# Patient Record
Sex: FEMALE | Race: BLACK OR AFRICAN AMERICAN | Employment: OTHER | URBAN - METROPOLITAN AREA
[De-identification: names, ages, dates, MRNs, and addresses within clinical notes are randomized per-mention and may not be internally consistent; named-entity substitution may affect disease eponyms.]

---

## 2017-02-06 PROBLEM — Z86.79 HISTORY OF CEREBRAL ANEURYSM REPAIR: Status: ACTIVE | Noted: 2017-02-06

## 2017-02-06 PROBLEM — Z98.890 HISTORY OF CEREBRAL ANEURYSM REPAIR: Status: ACTIVE | Noted: 2017-02-06

## 2017-02-06 PROBLEM — F03.A0 MILD DEMENTIA (HCC): Status: ACTIVE | Noted: 2017-02-06

## 2018-04-12 DIAGNOSIS — I25.10 CORONARY ARTERY DISEASE INVOLVING NATIVE HEART WITHOUT ANGINA PECTORIS, UNSPECIFIED VESSEL OR LESION TYPE: ICD-10-CM

## 2018-04-12 RX ORDER — CLOPIDOGREL BISULFATE 75 MG/1
75 TABLET ORAL DAILY
Qty: 90 TABLET | Refills: 3 | Status: SHIPPED | OUTPATIENT
Start: 2018-04-12 | End: 2018-07-02 | Stop reason: SDUPTHER

## 2018-07-02 DIAGNOSIS — E87.6 HYPOKALEMIA: ICD-10-CM

## 2018-07-02 DIAGNOSIS — I25.10 CORONARY ARTERY DISEASE INVOLVING NATIVE HEART WITHOUT ANGINA PECTORIS, UNSPECIFIED VESSEL OR LESION TYPE: ICD-10-CM

## 2018-07-02 DIAGNOSIS — G89.4 CHRONIC PAIN SYNDROME: ICD-10-CM

## 2018-07-02 RX ORDER — CLOPIDOGREL BISULFATE 75 MG/1
75 TABLET ORAL DAILY
Qty: 90 TABLET | Refills: 3 | Status: SHIPPED | OUTPATIENT
Start: 2018-07-02 | End: 2019-06-18 | Stop reason: SDUPTHER

## 2018-07-02 RX ORDER — POTASSIUM CHLORIDE 750 MG/1
10 TABLET, EXTENDED RELEASE ORAL DAILY
Qty: 90 TABLET | Refills: 1 | Status: SHIPPED | OUTPATIENT
Start: 2018-07-02 | End: 2019-06-16 | Stop reason: ALTCHOICE

## 2018-07-02 RX ORDER — METOPROLOL SUCCINATE 25 MG/1
25 TABLET, EXTENDED RELEASE ORAL DAILY
Qty: 90 TABLET | Refills: 1 | Status: SHIPPED | OUTPATIENT
Start: 2018-07-02 | End: 2019-06-17 | Stop reason: SDUPTHER

## 2018-07-09 ENCOUNTER — OFFICE VISIT (OUTPATIENT)
Dept: FAMILY MEDICINE CLINIC | Age: 73
End: 2018-07-09

## 2018-07-09 VITALS
BODY MASS INDEX: 24.37 KG/M2 | RESPIRATION RATE: 18 BRPM | HEART RATE: 61 BPM | TEMPERATURE: 97.5 F | SYSTOLIC BLOOD PRESSURE: 118 MMHG | DIASTOLIC BLOOD PRESSURE: 88 MMHG | WEIGHT: 151 LBS | OXYGEN SATURATION: 97 %

## 2018-07-09 DIAGNOSIS — I10 ESSENTIAL HYPERTENSION: Primary | ICD-10-CM

## 2018-07-09 DIAGNOSIS — E87.6 HYPOKALEMIA: ICD-10-CM

## 2018-07-09 DIAGNOSIS — F03.A0 MILD DEMENTIA: ICD-10-CM

## 2018-07-09 PROCEDURE — 99213 OFFICE O/P EST LOW 20 MIN: CPT | Performed by: PHYSICIAN ASSISTANT

## 2018-07-09 ASSESSMENT — PATIENT HEALTH QUESTIONNAIRE - PHQ9
2. FEELING DOWN, DEPRESSED OR HOPELESS: 0
SUM OF ALL RESPONSES TO PHQ QUESTIONS 1-9: 0
SUM OF ALL RESPONSES TO PHQ9 QUESTIONS 1 & 2: 0
1. LITTLE INTEREST OR PLEASURE IN DOING THINGS: 0

## 2018-07-09 NOTE — PROGRESS NOTES
[employer]  [address] [facility], [address] [place] [address] Drive  2/[birth year]18 7/9/18      HPI:  Patient presents for general check up, htn and hypokalemia. She is accompanied by her son, Lloyd Gowers. She continues to have no insurance and has outstanding medical bills that Lloyd Gowers keeps referring to. He has been down to [facility] office and is trying to get her some help. She has been getting her medications with assistance from the pharmacy. She takes them everyday with no s/e. He is concerned about her memory, although he believes she has not decompensated since her last visit. She has mild memory loss. He states that she has had a ct scan already to evaluate. Her most recent labs were 12/17. We reviewed. She has no acute complaints. Past Medical History:   Diagnosis Date    Arthritis     Brain aneurysm     Hypertension     Lupus         Past Surgical History:   Procedure Laterality Date    APPENDECTOMY      BRAIN ANEURYSM SURGERY      6     CHOLECYSTECTOMY  4/8/2016    cholelithias    INCONTINENCE SURGERY         Current Outpatient Prescriptions   Medication Sig Dispense Refill    clopidogrel (PLAVIX) 75 MG tablet Take 1 tablet by mouth daily 90 tablet 3    potassium chloride (KLOR-CON M10) 10 MEQ extended release tablet Take 1 tablet by mouth daily 90 tablet 1    metoprolol succinate (TOPROL XL) 25 MG extended release tablet Take 1 tablet by mouth daily 90 tablet 1    acetaminophen (APAP EXTRA STRENGTH) 500 MG tablet Take 2 tablets by mouth every 6 hours as needed for Pain 120 tablet 3     No current facility-administered medications for this visit. Allergies   Allergen Reactions    Dye [Iodides]      Ct scan dye    Penicillins Hives       Family History   Problem Relation Age of Onset    Family history unknown: Yes       Social History     Social History    Marital status:       Spouse name: N/A    Number of children: N/A    Years of

## 2019-03-13 ENCOUNTER — TELEPHONE (OUTPATIENT)
Dept: FAMILY MEDICINE CLINIC | Age: 74
End: 2019-03-13

## 2019-03-14 RX ORDER — GUAIFENESIN 400 MG/1
400 TABLET ORAL 4 TIMES DAILY PRN
Qty: 15 TABLET | Refills: 0 | Status: SHIPPED | OUTPATIENT
Start: 2019-03-14 | End: 2019-06-16 | Stop reason: ALTCHOICE

## 2019-06-16 ENCOUNTER — HOSPITAL ENCOUNTER (EMERGENCY)
Age: 74
Discharge: HOME | End: 2019-06-16
Attending: EMERGENCY MEDICINE

## 2019-06-16 ENCOUNTER — APPOINTMENT (OUTPATIENT)
Dept: CT IMAGING | Age: 74
End: 2019-06-16

## 2019-06-16 VITALS
HEIGHT: 66 IN | BODY MASS INDEX: 24.91 KG/M2 | OXYGEN SATURATION: 93 % | DIASTOLIC BLOOD PRESSURE: 85 MMHG | RESPIRATION RATE: 16 BRPM | WEIGHT: 155 LBS | SYSTOLIC BLOOD PRESSURE: 158 MMHG | HEART RATE: 61 BPM | TEMPERATURE: 97.9 F

## 2019-06-16 DIAGNOSIS — N20.0 NEPHROLITHIASIS: ICD-10-CM

## 2019-06-16 DIAGNOSIS — M54.31 SCIATICA OF RIGHT SIDE: ICD-10-CM

## 2019-06-16 DIAGNOSIS — M51.26 LUMBAR DISC HERNIATION: Primary | ICD-10-CM

## 2019-06-16 LAB
ALBUMIN SERPL-MCNC: 3.5 G/DL (ref 3.5–5.2)
ALP BLD-CCNC: 117 U/L (ref 35–104)
ALT SERPL-CCNC: 14 U/L (ref 0–32)
ANION GAP SERPL CALCULATED.3IONS-SCNC: 9 MMOL/L (ref 7–16)
AST SERPL-CCNC: 19 U/L (ref 0–31)
BACTERIA: NORMAL /HPF
BASOPHILS ABSOLUTE: 0.03 E9/L (ref 0–0.2)
BASOPHILS RELATIVE PERCENT: 0.4 % (ref 0–2)
BILIRUB SERPL-MCNC: 0.5 MG/DL (ref 0–1.2)
BILIRUBIN URINE: NEGATIVE
BLOOD, URINE: NEGATIVE
BUN BLDV-MCNC: 15 MG/DL (ref 8–23)
CALCIUM SERPL-MCNC: 9 MG/DL (ref 8.6–10.2)
CHLORIDE BLD-SCNC: 105 MMOL/L (ref 98–107)
CLARITY: CLEAR
CO2: 26 MMOL/L (ref 22–29)
COLOR: YELLOW
CREAT SERPL-MCNC: 1 MG/DL (ref 0.5–1)
EOSINOPHILS ABSOLUTE: 0.13 E9/L (ref 0.05–0.5)
EOSINOPHILS RELATIVE PERCENT: 1.7 % (ref 0–6)
GFR AFRICAN AMERICAN: >60
GFR NON-AFRICAN AMERICAN: >60 ML/MIN/1.73
GLUCOSE BLD-MCNC: 99 MG/DL (ref 74–99)
GLUCOSE URINE: NEGATIVE MG/DL
HCT VFR BLD CALC: 43.7 % (ref 34–48)
HEMOGLOBIN: 14.1 G/DL (ref 11.5–15.5)
IMMATURE GRANULOCYTES #: 0.03 E9/L
IMMATURE GRANULOCYTES %: 0.4 % (ref 0–5)
KETONES, URINE: NEGATIVE MG/DL
LACTIC ACID: 0.8 MMOL/L (ref 0.5–2.2)
LEUKOCYTE ESTERASE, URINE: NEGATIVE
LIPASE: 22 U/L (ref 13–60)
LYMPHOCYTES ABSOLUTE: 1.78 E9/L (ref 1.5–4)
LYMPHOCYTES RELATIVE PERCENT: 23.6 % (ref 20–42)
MCH RBC QN AUTO: 31.2 PG (ref 26–35)
MCHC RBC AUTO-ENTMCNC: 32.3 % (ref 32–34.5)
MCV RBC AUTO: 96.7 FL (ref 80–99.9)
MONOCYTES ABSOLUTE: 1.05 E9/L (ref 0.1–0.95)
MONOCYTES RELATIVE PERCENT: 13.9 % (ref 2–12)
MUCUS: PRESENT
NEUTROPHILS ABSOLUTE: 4.52 E9/L (ref 1.8–7.3)
NEUTROPHILS RELATIVE PERCENT: 60 % (ref 43–80)
NITRITE, URINE: NEGATIVE
PDW BLD-RTO: 12.6 FL (ref 11.5–15)
PH UA: 6 (ref 5–9)
PLATELET # BLD: 261 E9/L (ref 130–450)
PMV BLD AUTO: 9.6 FL (ref 7–12)
POTASSIUM SERPL-SCNC: 3.6 MMOL/L (ref 3.5–5)
PROTEIN UA: NEGATIVE MG/DL
RBC # BLD: 4.52 E12/L (ref 3.5–5.5)
RBC UA: NORMAL /HPF (ref 0–2)
REASON FOR REJECTION: NORMAL
REJECTED TEST: NORMAL
SODIUM BLD-SCNC: 140 MMOL/L (ref 132–146)
SPECIFIC GRAVITY UA: 1.02 (ref 1–1.03)
TOTAL PROTEIN: 7.6 G/DL (ref 6.4–8.3)
UROBILINOGEN, URINE: 0.2 E.U./DL
WBC # BLD: 7.5 E9/L (ref 4.5–11.5)
WBC UA: NORMAL /HPF (ref 0–5)

## 2019-06-16 PROCEDURE — 6360000002 HC RX W HCPCS: Performed by: EMERGENCY MEDICINE

## 2019-06-16 PROCEDURE — 72131 CT LUMBAR SPINE W/O DYE: CPT

## 2019-06-16 PROCEDURE — 80053 COMPREHEN METABOLIC PANEL: CPT

## 2019-06-16 PROCEDURE — 81001 URINALYSIS AUTO W/SCOPE: CPT

## 2019-06-16 PROCEDURE — 96374 THER/PROPH/DIAG INJ IV PUSH: CPT

## 2019-06-16 PROCEDURE — 83690 ASSAY OF LIPASE: CPT

## 2019-06-16 PROCEDURE — 85025 COMPLETE CBC W/AUTO DIFF WBC: CPT

## 2019-06-16 PROCEDURE — 2580000003 HC RX 258: Performed by: EMERGENCY MEDICINE

## 2019-06-16 PROCEDURE — 99284 EMERGENCY DEPT VISIT MOD MDM: CPT

## 2019-06-16 PROCEDURE — 96375 TX/PRO/DX INJ NEW DRUG ADDON: CPT

## 2019-06-16 PROCEDURE — 87088 URINE BACTERIA CULTURE: CPT

## 2019-06-16 PROCEDURE — 83605 ASSAY OF LACTIC ACID: CPT

## 2019-06-16 PROCEDURE — 74176 CT ABD & PELVIS W/O CONTRAST: CPT

## 2019-06-16 RX ORDER — 0.9 % SODIUM CHLORIDE 0.9 %
1000 INTRAVENOUS SOLUTION INTRAVENOUS ONCE
Status: COMPLETED | OUTPATIENT
Start: 2019-06-16 | End: 2019-06-16

## 2019-06-16 RX ORDER — MORPHINE SULFATE 2 MG/ML
4 INJECTION, SOLUTION INTRAMUSCULAR; INTRAVENOUS ONCE
Status: COMPLETED | OUTPATIENT
Start: 2019-06-16 | End: 2019-06-16

## 2019-06-16 RX ORDER — ONDANSETRON 2 MG/ML
4 INJECTION INTRAMUSCULAR; INTRAVENOUS ONCE
Status: COMPLETED | OUTPATIENT
Start: 2019-06-16 | End: 2019-06-16

## 2019-06-16 RX ORDER — NAPROXEN 500 MG/1
500 TABLET ORAL 2 TIMES DAILY WITH MEALS
Qty: 14 TABLET | Refills: 3 | Status: SHIPPED | OUTPATIENT
Start: 2019-06-16 | End: 2019-06-18

## 2019-06-16 RX ORDER — KETOROLAC TROMETHAMINE 30 MG/ML
15 INJECTION, SOLUTION INTRAMUSCULAR; INTRAVENOUS ONCE
Status: COMPLETED | OUTPATIENT
Start: 2019-06-16 | End: 2019-06-16

## 2019-06-16 RX ORDER — TAMSULOSIN HYDROCHLORIDE 0.4 MG/1
0.4 CAPSULE ORAL DAILY
Qty: 30 CAPSULE | Refills: 3 | Status: SHIPPED | OUTPATIENT
Start: 2019-06-16 | End: 2021-02-15

## 2019-06-16 RX ORDER — HYDROCODONE BITARTRATE AND ACETAMINOPHEN 5; 325 MG/1; MG/1
1 TABLET ORAL EVERY 4 HOURS PRN
Qty: 5 TABLET | Refills: 0 | Status: SHIPPED | OUTPATIENT
Start: 2019-06-16 | End: 2019-06-18

## 2019-06-16 RX ORDER — ONDANSETRON 4 MG/1
4 TABLET, ORALLY DISINTEGRATING ORAL EVERY 8 HOURS PRN
Qty: 10 TABLET | Refills: 0 | Status: SHIPPED | OUTPATIENT
Start: 2019-06-16 | End: 2019-06-18

## 2019-06-16 RX ADMIN — SODIUM CHLORIDE 1000 ML: 9 INJECTION, SOLUTION INTRAVENOUS at 08:24

## 2019-06-16 RX ADMIN — KETOROLAC TROMETHAMINE 15 MG: 30 INJECTION, SOLUTION INTRAMUSCULAR at 12:09

## 2019-06-16 RX ADMIN — ONDANSETRON 4 MG: 2 INJECTION INTRAMUSCULAR; INTRAVENOUS at 08:24

## 2019-06-16 RX ADMIN — MORPHINE SULFATE 4 MG: 2 INJECTION, SOLUTION INTRAMUSCULAR; INTRAVENOUS at 08:23

## 2019-06-16 ASSESSMENT — PAIN SCALES - GENERAL
PAINLEVEL_OUTOF10: 9
PAINLEVEL_OUTOF10: 9
PAINLEVEL_OUTOF10: 7

## 2019-06-16 ASSESSMENT — PAIN DESCRIPTION - DESCRIPTORS: DESCRIPTORS: PATIENT UNABLE TO DESCRIBE

## 2019-06-16 ASSESSMENT — ENCOUNTER SYMPTOMS
BACK PAIN: 1
NAUSEA: 0
COUGH: 0
SHORTNESS OF BREATH: 0
ABDOMINAL PAIN: 0
SORE THROAT: 0
VOMITING: 0
COLOR CHANGE: 0

## 2019-06-16 ASSESSMENT — PAIN DESCRIPTION - ORIENTATION: ORIENTATION: RIGHT;LOWER

## 2019-06-16 ASSESSMENT — PAIN DESCRIPTION - LOCATION: LOCATION: FLANK;BACK

## 2019-06-16 ASSESSMENT — PAIN - FUNCTIONAL ASSESSMENT: PAIN_FUNCTIONAL_ASSESSMENT: PREVENTS OR INTERFERES SOME ACTIVE ACTIVITIES AND ADLS

## 2019-06-16 ASSESSMENT — PAIN DESCRIPTION - PROGRESSION: CLINICAL_PROGRESSION: GRADUALLY WORSENING

## 2019-06-16 ASSESSMENT — PAIN DESCRIPTION - FREQUENCY: FREQUENCY: CONTINUOUS

## 2019-06-16 ASSESSMENT — PAIN DESCRIPTION - PAIN TYPE: TYPE: ACUTE PAIN

## 2019-06-16 NOTE — ED NOTES
ATTENDING PROVIDER ATTESTATION:     Melo Menjivar presented to the emergency department for evaluation of Back Pain (Started about one week ago, denies injury, radiates to flank area right side) and Dysuria (Since yesterday)   and was initially evaluated by the Medical Resident. See Original ED Note for H&P and ED course above. I have reviewed and discussed the case, including pertinent history (medical, surgical, family and social) and exam findings with the Medical Resident assigned to Melo Menjivar. I have personally performed and/or participated in the history, exam, medical decision making, and procedures and agree with all pertinent clinical information and any additional changes or corrections are noted below in my assessment and plan. I have discussed this patient in detail with the resident, and provided the instruction and education,       I have reviewed my findings and recommendations with the assigned Medical Resident, Melo Menjivar and members of family present at the time of disposition. Review of Systems:   Pertinent positives and negatives are stated within HPI, all other systems reviewed and are negative.    --------------------------------------------- PAST HISTORY ---------------------------------------------  Past Medical History:  has a past medical history of Arthritis, Brain aneurysm, Hypertension, and Lupus ([facility] [address] Past Surgical History:  has a past surgical history that includes Brain aneurysm surgery; Appendectomy; Incontinence surgery; and Cholecystectomy (4/8/2016). Social History:  reports that she quit smoking about 33 years ago. She started smoking about 54 years ago. She smoked 1.00 pack per day. She has never used smokeless tobacco. She reports that she does not drink alcohol or use drugs. Family History: Family history is unknown by patient. The patients home medications have been reviewed.     Allergies: Dye [iodides] and Penicillins               Back pain  No red flag sx  ?disc disease  No bowel or bladder incontinence or saddle anesthesia  No motor weakness  No fever  Improving in the ED  Exam is reassuring    1. Lumbar disc herniation    2.  Nephrolithiasis           Abiola Moseley MD  06/16/19 [id number]

## 2019-06-16 NOTE — ED PROVIDER NOTES
Delvin Sprague is a 72 y.o. female with PMH significant for HTN and Lupus presenting to the emergency department for Flank Pain. Patient reports she has had intermittent right flank pain over the past week. Patient denies any injury. Patient complains associated burning with urination that started yesterday. Patient denies any saddle paresthesia, urinary retention or fecal incontinence. Patient lives at home with her son. Per chart review, patient has history of lumbar pain and kidney stones. The history is provided by the patient. Abdominal Pain   Pain location:  R flank  Pain quality: sharp    Pain radiates to:  Suprapubic region  Chronicity:  New  Relieved by:  Position changes  Worsened by:  Palpation and urination  Associated symptoms: dysuria    Associated symptoms: no chest pain, no chills, no cough, no fever, no nausea, no shortness of breath, no sore throat and no vomiting        Review of Systems   Constitutional: Negative for chills and fever. HENT: Negative for congestion and sore throat. Eyes: Negative for visual disturbance. Respiratory: Negative for cough and shortness of breath. Cardiovascular: Negative for chest pain. Gastrointestinal: Negative for abdominal pain, nausea and vomiting. Genitourinary: Positive for dysuria and flank pain. Negative for decreased urine volume. Musculoskeletal: Positive for back pain. Negative for neck pain. Skin: Negative for color change. Neurological: Negative for headaches. Psychiatric/Behavioral: Negative for confusion. Physical Exam   Constitutional: She is oriented to person, place, and time. She appears well-developed and well-nourished. No distress. HENT:   Head: Normocephalic and atraumatic. Nose: Nose normal.   Mouth/Throat: Oropharynx is clear and moist and mucous membranes are normal.   Eyes: Conjunctivae are normal.   Neck: Normal range of motion. Neck supple.    Cardiovascular: Normal rate, regular rhythm, intact distal pulses and normal pulses. Pulses:       Dorsalis pedis pulses are 2+ on the right side, and 2+ on the left side. Pulmonary/Chest: Effort normal and breath sounds normal. She has no wheezes. She has no rhonchi. She has no rales. Abdominal: Soft. Bowel sounds are normal. She exhibits no pulsatile midline mass. There is no tenderness. There is CVA tenderness (right). There is no rigidity, no rebound and no guarding. Musculoskeletal:        Cervical back: She exhibits no tenderness and no bony tenderness. Thoracic back: She exhibits no tenderness and no bony tenderness. Lumbar back: She exhibits tenderness and bony tenderness. She exhibits no swelling, no edema and no deformity. Back:    Tenderness in area of right SI joint. Patient has positive straight leg raise test on the right. Patient has equal  strength. Patient able to perform elbow flexion and extension against resistance. Patient able to perform hip flexion, knee extension, plantar flexion, dorsiflexion and extension for hallicus longus muscle against resistance. Neurological: She is alert and oriented to person, place, and time. No cranial nerve deficit (CN II-XII grossly intact) or sensory deficit. She exhibits normal muscle tone. Sensation intact throughout b/l lower extremities   Skin: Skin is warm and dry. Capillary refill takes less than 2 seconds. Psychiatric: She has a normal mood and affect. Her speech is normal.   Nursing note and vitals reviewed. Procedures    MDM  Number of Diagnoses or Management Options  Lumbar disc herniation:   Nephrolithiasis:   Diagnosis management comments: Patient presents to the ED for back pain/flank pain over 1 week. She also complained of associated dysuria. We initially obtained blood work, imaging and urine sample. Patient was given pain medication and IV fluids for their symptoms with moderate improvement.  Workup in the ED revealed lumbar disc herniation which is consistent with patient's presenation. Left sided kidney stone also noted and discussed with patient. No lower exstremity weakness or sensory deficit or complaints of saddle paraesthesias, urinary retention or fecal incontiennce for further concern of cauda equina or epidural abscess. Results of blood work unremarkable. Patient continues to be non-toxic on re-evaluation. Patient is hemodynamically stable. Findings were discussed with the patient and reasons to immediately return to the ED were articulated to them. They will follow-up with their PMD and Neurosurgery. Patient agrees with the plan and all questions were answered. ED Course as of Jun 16 2122   Sun Jun 16, 2019   1157 She reassessed. Patient comfortable at this time. Patient and son aware of current results. Patient states she has an appoint with her primary care doctor on Tuesday and will follow up with them then. [MA]      ED Course User Index  [MA] Mal Johnson, DO       ----------------------------------------------- PAST HISTORY --------------------------------------------  Past Medical History:  has a past medical history of Arthritis, Brain aneurysm, Hypertension, and Lupus ([facility] [address] Past Surgical History:  has a past surgical history that includes Brain aneurysm surgery; Appendectomy; Incontinence surgery; and Cholecystectomy (4/8/2016). Social History:  reports that she quit smoking about 33 years ago. She started smoking about 54 years ago. She smoked 1.00 pack per day. She has never used smokeless tobacco. She reports that she does not drink alcohol or use drugs. Family History: Family history is unknown by patient. The patients home medications have been reviewed.     Allergies: Dye [iodides] and Penicillins    ------------------------------------------------ RESULTS ---------------------------------------------------    LABS:  Results for orders placed or performed during the hospital encounter of 06/16/19 CBC auto differential   Result Value Ref Range    WBC 7.5 4.5 - 11.5 E9/L    RBC 4.52 3.50 - 5.50 E12/L    Hemoglobin 14.1 11.5 - 15.5 g/dL    Hematocrit 43.7 34.0 - 48.0 %    MCV 96.7 80.0 - 99.9 fL    MCH 31.2 26.0 - 35.0 pg    MCHC 32.3 32.0 - 34.5 %    RDW 12.6 11.5 - 15.0 fL    Platelets 112 609 - 090 E9/L    MPV 9.6 7.0 - 12.0 fL    Neutrophils % 60.0 43.0 - 80.0 %    Immature Granulocytes % 0.4 0.0 - 5.0 %    Lymphocytes % 23.6 20.0 - 42.0 %    Monocytes % 13.9 (H) 2.0 - 12.0 %    Eosinophils % 1.7 0.0 - 6.0 %    Basophils % 0.4 0.0 - 2.0 %    Neutrophils # 4.52 1.80 - 7.30 E9/L    Immature Granulocytes # 0.03 E9/L    Lymphocytes # 1.78 1.50 - 4.00 E9/L    Monocytes # 1.05 (H) 0.10 - 0.95 E9/L    Eosinophils # 0.13 0.05 - 0.50 E9/L    Basophils # 0.03 0.00 - 0.20 E9/L   Lactic Acid, Plasma   Result Value Ref Range    Lactic Acid 0.8 0.5 - 2.2 mmol/L   Lipase   Result Value Ref Range    Lipase 22 13 - 60 U/L   Urinalysis with Microscopic   Result Value Ref Range    Color, UA Yellow Straw/Yellow    Clarity, UA Clear Clear    Glucose, Ur Negative Negative mg/dL    Bilirubin Urine Negative Negative    Ketones, Urine Negative Negative mg/dL    Specific Gravity, UA 1.020 1.005 - 1.030    Blood, Urine Negative Negative    pH, UA 6.0 5.0 - 9.0    Protein, UA Negative Negative mg/dL    Urobilinogen, Urine 0.2 <2.0 E.U./dL    Nitrite, Urine Negative Negative    Leukocyte Esterase, Urine Negative Negative    Mucus, UA Present     WBC, UA NONE 0 - 5 /HPF    RBC, UA NONE 0 - 2 /HPF    Bacteria, UA NONE /HPF   SPECIMEN REJECTION   Result Value Ref Range    Rejected Test CMP     Reason for Rejection see below    Comprehensive metabolic panel   Result Value Ref Range    Sodium 140 132 - 146 mmol/L    Potassium 3.6 3.5 - 5.0 mmol/L    Chloride 105 98 - 107 mmol/L    CO2 26 22 - 29 mmol/L    Anion Gap 9 7 - 16 mmol/L    Glucose 99 74 - 99 mg/dL    BUN 15 8 - 23 mg/dL    CREATININE 1.0 0.5 - 1.0 mg/dL    GFR Non- todays results, in addition to providing specific details for the plan of care and counseling regarding the diagnosis and prognosis.     --------------------------------------- IMPRESSION & DISPOSITION --------------------------------     IMPRESSION(s):  1. Lumbar disc herniation    2. Nephrolithiasis    3. Sciatica of right side        This patient's ED course included: a personal history and physicial examination and IV medications    This patient has remained hemodynamically stable during their ED course. DISPOSITION:  Disposition: Discharge to home. Patient condition is stable. END OF PROVIDER NOTE.             Azalea Marmolejo DO  Resident  06/16/19 [id number]

## 2019-06-16 NOTE — ED NOTES
Bed: 17  Expected date:   Expected time:   Means of arrival:   Comments:  SHIRA Camacho RN  06/16/19 [id number]

## 2019-06-17 DIAGNOSIS — I25.10 CORONARY ARTERY DISEASE INVOLVING NATIVE HEART WITHOUT ANGINA PECTORIS, UNSPECIFIED VESSEL OR LESION TYPE: ICD-10-CM

## 2019-06-17 RX ORDER — METOPROLOL SUCCINATE 25 MG/1
25 TABLET, EXTENDED RELEASE ORAL DAILY
Qty: 90 TABLET | Refills: 1 | Status: SHIPPED | OUTPATIENT
Start: 2019-06-17 | End: 2019-06-18 | Stop reason: SDUPTHER

## 2019-06-18 ENCOUNTER — OFFICE VISIT (OUTPATIENT)
Dept: FAMILY MEDICINE CLINIC | Age: 74
End: 2019-06-18

## 2019-06-18 VITALS
HEART RATE: 81 BPM | RESPIRATION RATE: 18 BRPM | BODY MASS INDEX: 24.68 KG/M2 | HEIGHT: 66 IN | DIASTOLIC BLOOD PRESSURE: 86 MMHG | TEMPERATURE: 98.6 F | OXYGEN SATURATION: 98 % | WEIGHT: 153.6 LBS | SYSTOLIC BLOOD PRESSURE: 138 MMHG

## 2019-06-18 DIAGNOSIS — I10 ESSENTIAL HYPERTENSION: Primary | ICD-10-CM

## 2019-06-18 DIAGNOSIS — Z12.31 ENCOUNTER FOR SCREENING MAMMOGRAM FOR BREAST CANCER: ICD-10-CM

## 2019-06-18 DIAGNOSIS — Z12.11 SCREENING FOR COLON CANCER: ICD-10-CM

## 2019-06-18 DIAGNOSIS — M54.16 LUMBAR RADICULOPATHY: ICD-10-CM

## 2019-06-18 DIAGNOSIS — I25.10 CORONARY ARTERY DISEASE INVOLVING NATIVE HEART WITHOUT ANGINA PECTORIS, UNSPECIFIED VESSEL OR LESION TYPE: ICD-10-CM

## 2019-06-18 LAB — URINE CULTURE, ROUTINE: NORMAL

## 2019-06-18 PROCEDURE — 99214 OFFICE O/P EST MOD 30 MIN: CPT | Performed by: FAMILY MEDICINE

## 2019-06-18 RX ORDER — POTASSIUM CHLORIDE 750 MG/1
TABLET, FILM COATED, EXTENDED RELEASE ORAL
COMMUNITY
Start: 2019-04-01 | End: 2019-06-18 | Stop reason: SDUPTHER

## 2019-06-18 RX ORDER — METOPROLOL SUCCINATE 25 MG/1
25 TABLET, EXTENDED RELEASE ORAL DAILY
Qty: 90 TABLET | Refills: 1 | Status: SHIPPED
Start: 2019-06-18 | End: 2020-07-13 | Stop reason: SDUPTHER

## 2019-06-18 RX ORDER — POTASSIUM CHLORIDE 750 MG/1
10 TABLET, FILM COATED, EXTENDED RELEASE ORAL 2 TIMES DAILY
Qty: 60 TABLET | Refills: 2 | Status: SHIPPED
Start: 2019-06-18 | End: 2021-02-15

## 2019-06-18 RX ORDER — CLOPIDOGREL BISULFATE 75 MG/1
75 TABLET ORAL DAILY
Qty: 90 TABLET | Refills: 3 | Status: SHIPPED
Start: 2019-06-18 | End: 2020-07-13 | Stop reason: SDUPTHER

## 2019-06-18 ASSESSMENT — ENCOUNTER SYMPTOMS
CONSTIPATION: 0
SHORTNESS OF BREATH: 0
DIARRHEA: 0
RHINORRHEA: 1
NAUSEA: 0
WHEEZING: 0
ABDOMINAL PAIN: 0
SORE THROAT: 0
VOMITING: 0
COUGH: 1
BACK PAIN: 1
SINUS PRESSURE: 0

## 2019-06-18 ASSESSMENT — PATIENT HEALTH QUESTIONNAIRE - PHQ9
1. LITTLE INTEREST OR PLEASURE IN DOING THINGS: 0
SUM OF ALL RESPONSES TO PHQ QUESTIONS 1-9: 0
2. FEELING DOWN, DEPRESSED OR HOPELESS: 0
SUM OF ALL RESPONSES TO PHQ QUESTIONS 1-9: 0
SUM OF ALL RESPONSES TO PHQ9 QUESTIONS 1 & 2: 0

## 2019-06-18 NOTE — PATIENT INSTRUCTIONS
Patient Education        Kidney Stone: Care Instructions  Your Care Instructions    Kidney stones are formed when salts, minerals, and other substances normally found in the urine clump together. They can be as small as grains of sand or, rarely, as large as golf balls. While the stone is traveling through the ureter, which is the tube that carries urine from the kidney to the bladder, you will probably feel pain. The pain may be mild or very severe. You may also have some blood in your urine. As soon as the stone reaches the bladder, any intense pain should go away. If a stone is too large to pass on its own, you may need a medical procedure to help you pass the stone. The doctor has checked you carefully, but problems can develop later. If you notice any problems or new symptoms, get medical treatment right away. Follow-up care is a key part of your treatment and safety. Be sure to make and go to all appointments, and call your doctor if you are having problems. It's also a good idea to know your test results and keep a list of the medicines you take. How can you care for yourself at home? · Drink plenty of fluids, enough so that your urine is light yellow or clear like water. If you have kidney, heart, or liver disease and have to limit fluids, talk with your doctor before you increase the amount of fluids you drink. · Take pain medicines exactly as directed. Call your doctor if you think you are having a problem with your medicine. ? If the doctor gave you a prescription medicine for pain, take it as prescribed. ? If you are not taking a prescription pain medicine, ask your doctor if you can take an over-the-counter medicine. Read and follow all instructions on the label. · Your doctor may ask you to strain your urine so that you can collect your kidney stone when it passes. You can use a kitchen strainer or a tea strainer to catch the stone.  Store it in a plastic bag until you see your doctor again.  Preventing future kidney stones  Some changes in your diet may help prevent kidney stones. Depending on the cause of your stones, your doctor may recommend that you:  · Drink plenty of fluids, enough so that your urine is light yellow or clear like water. If you have kidney, heart, or liver disease and have to limit fluids, talk with your doctor before you increase the amount of fluids you drink. · Limit coffee, tea, and alcohol. Also avoid grapefruit juice. · Do not take more than the recommended daily dose of vitamins C and D.  · Avoid antacids such as Gaviscon, Maalox, Mylanta, or Tums. · Limit the amount of salt (sodium) in your diet. · Eat a balanced diet that is not too high in protein. · Limit foods that are high in a substance called oxalate, which can cause kidney stones. These foods include dark green vegetables, rhubarb, chocolate, wheat bran, nuts, cranberries, and beans. When should you call for help? Call your doctor now or seek immediate medical care if:    · You cannot keep down fluids.     · Your pain gets worse.     · You have a fever or chills.     · You have new or worse pain in your back just below your rib cage (the flank area).     · You have new or more blood in your urine.    Watch closely for changes in your health, and be sure to contact your doctor if:    · You do not get better as expected. Where can you learn more? Go to https://[employer].MYFLY. org and sign in to your [employer] account. Enter Z610 in the "[employer]" box to learn more about \"Kidney Stone: Care Instructions. \"     If you do not have an account, please click on the \"Sign Up Now\" link. Current as of: October 31, 2018  Content Version: 12.0  © 5720-9388 [employer], Incorporated. Care instructions adapted under license by [facility] ([facility]).  If you have questions about a medical condition or this instruction, always ask your healthcare professional. Alba Saxena any warranty or liability for your use of this information.

## 2019-06-18 NOTE — PROGRESS NOTES
Patient is a 72 y.o. female presenting today for follow up of hypertension and CAD. Hypertension:   Patient is here for follow up chronic hypertension. Patient is  compliant with lifestyle modifications. Patient is  well controlled. Patient denies chest pain, diaphoresis, dyspnea, dyspnea on exertion, peripheral edema, palpitations, HA, visual issues. Cardiovascular risk factors: advanced age (older than 54 for men, 72 for women) and hypertension. Patient does not smoke. Is currently on metoprolol 25 mg daily. Taking as prescribed. No adverse effects. Son states that she gets a cough once in a while. Cough is nonproductive. She does have nasal congestion and runny nose at times. She denies any heartburn. She was recently in the ED due to back pain. Pain is in her lower back on the right side. She states her pain started on Saturday 6/15/19. Pain is throbbing in nature. Pain is 2-3 out of 10. Alleviating factors: nothing. Exacerbating factors:lifting. She has been taking tylenol as needed for pain. CT of abdomen and pelvis showed left 8 mm nonobstructing stone. She was started on norco and flomax in the ED. She has not started the medications yet. Mini-Mental State Examination (MMSE)    Instructions: Ask the questions in the order listed. Score one point for each correct response within each question or activity. Maximum Score Patients Score Questions   5 4  What is the year? Season? Date? Day of the week? Month?    5 5  Where are we now: State? County? Town/city? Hospital? Floor?    3 3  The examiner names three unrelated objects clearly and slowly, then asks the patient to name all three of them. The patients response is used for scoring. The examiner repeats them until patient learns all of them, if possible. Number of trials: _________     5 5  I would like you to count backward from 100 by sevens.  (93, 86, 79, 72, 65, ) Stop after five answers.   Alternative: Spell WORLD backwards.  (D-L-R-[id number])   3 3  Earlier I told you the names of three things. Can you tell me what those  were?    2 2  Show the patient two simple objects, such as a wristwatch and a pencil, and ask the patient to name them. 1  1  Repeat the phrase: No ifs, ands, or buts.    3 3  Take the paper in your right hand, fold it in half, and put it on the floor.  (The examiner gives the patient a piece of blank paper.)   1 1  Please read this and do what it says.  (Written instruction is [employer]. )    1 1  Make up and write a sentence about anything.  (This sentence must contain a noun and a verb.)    1 1  Please copy this picture.  (The examiner gives the patient a blank  piece of paper and asks him/her to draw the symbol below. All 10  angles must be present and two must intersect.)        30 29  TOTAL       Interpretation of the MMSE     Method  Score  Interpretation   Single Cutoff <24 Abnormal   Range <21  >25 Increased odds of dementia  Decreased odds of dementia     Education 21  <23  <24 Abnormal for 8th grade education  Abnormal for high school education  Abnormal for college education   Severity 24-30  18-23  0-17 No cognitive impairment  Mild cognitive impairment  Severe cognitive impairment     Miguel Ángel Hamlin, [place] [address] [place], [address])    Patient's past medical, surgical, social and/or family history reviewed, updated in chart, and are non-contributory (unless otherwise stated). Medications and allergies also reviewed and updated in chart. /86 (Site: Left Upper Arm, Position: Sitting, Cuff Size: Large Adult)   Pulse 81   Temp 98.6 °F (37 °C) (Oral)   Resp 18   Ht 5' 6\" (1.676 m)   Wt 153 lb 9.6 oz (69.7 kg)   SpO2 98%   BMI 24.79 kg/m²     Review of Systems   Constitutional: Negative for chills, fatigue and fever. HENT: Positive for congestion and rhinorrhea. Negative for ear discharge, ear pain, postnasal drip, sinus pressure, sneezing and sore throat.     Respiratory: Positive for cough. Negative for shortness of breath and wheezing. Cardiovascular: Negative for chest pain, palpitations and leg swelling. Gastrointestinal: Negative for abdominal pain, constipation, diarrhea, nausea and vomiting. Genitourinary: Negative for dysuria, frequency and hematuria. Musculoskeletal: Positive for back pain. Negative for arthralgias and myalgias. Skin: Negative for rash. Neurological: Negative for dizziness, light-headedness and headaches. Physical Exam   Constitutional: She is oriented to person, place, and time. She appears well-developed and well-nourished. HENT:   Head: Normocephalic and atraumatic. Right Ear: External ear normal.   Left Ear: External ear normal.   Nose: Nose normal.   Mouth/Throat: Oropharynx is clear and moist.   Eyes: Pupils are equal, round, and reactive to light. Conjunctivae and EOM are normal.   Neck: Normal range of motion. Neck supple. No thyromegaly present. Cardiovascular: Normal rate, regular rhythm, normal heart sounds and intact distal pulses. Pulmonary/Chest: Effort normal and breath sounds normal. She has no wheezes. Abdominal: Soft. Bowel sounds are normal. There is no tenderness. There is CVA tenderness. Musculoskeletal:        Lumbar back: She exhibits decreased range of motion, tenderness, pain and spasm. Lymphadenopathy:     She has no cervical adenopathy. Neurological: She is alert and oriented to person, place, and time. She has normal reflexes. Skin: Skin is warm and dry. No rash noted. Psychiatric: She has a normal mood and affect. Her behavior is normal.   Nursing note and vitals reviewed. Assessment:  1. Essential hypertension  Blood pressure well controlled  Continue metoprolol as prescribed  Diet and exercise were discussed and recommended to the patient. - metoprolol succinate (TOPROL XL) 25 MG extended release tablet; Take 1 tablet by mouth daily  Dispense: 90 tablet; Refill: 1    2.  Coronary artery disease involving native heart without angina pectoris, unspecified vessel or lesion type  Stable  Continue current medications. - metoprolol succinate (TOPROL XL) 25 MG extended release tablet; Take 1 tablet by mouth daily  Dispense: 90 tablet; Refill: 1  - clopidogrel (PLAVIX) 75 MG tablet; Take 1 tablet by mouth daily  Dispense: 90 tablet; Refill: 3    3. Lumbar radiculopathy  RICE therapy  Likely secondary to kidney stone  Recommend she take medications as prescribed by the ED. Follow up if symptoms worsen or fail to improve. 4. Screening for colon cancer  FIT test given to the patient.   - POCT FECAL IMMUNOCHEMICAL TEST (FIT); Future    5. Encounter for screening mammogram for breast cancer  Mammogram ordered. - [facility] [place] [id number]; Future      Plan:  As above. Call or go to [address] if symptoms worsen or persist.  Return in about 3 months (around 9/18/2019), or if symptoms worsen or fail to improve, for htn, cad., or sooner if necessary. Educational materials and/or home exercises printed forpatient's review and were included in patient instructions on his/her After Visit Summary and given to patient at the end of visit. Counseledregarding above diagnosis, including possible risks and complications,  especially if left uncontrolled. Counseled regarding the possible side effects, risks, benefits and alternatives to treatment; patient and/orguardian verbalizes understanding, agrees, feels comfortable with and wishes to proceed with above treatment plan. Advised patient to call with any new medication issues, and read all Rx info from pharmacy to assureaware of all possible risks and side effects of medication before taking. Reviewed age and gender appropriate health screening exams and vaccinations.   Advised patient regarding importance of keeping up withrecommended health maintenance and to schedule as soon as possible if overdue, as this is important in assessing for undiagnosed pathology, especially cancer, as well as protecting against potentially harmful/lifethreatening disease. Patient and/or guardian verbalizes understanding and agrees with above counseling, assessment and plan. All questions answered.

## 2019-07-03 ENCOUNTER — TELEPHONE (OUTPATIENT)
Dept: FAMILY MEDICINE CLINIC | Age: 74
End: 2019-07-03

## 2019-07-08 ENCOUNTER — APPOINTMENT (OUTPATIENT)
Dept: CT IMAGING | Age: 74
End: 2019-07-08
Payer: OTHER MISCELLANEOUS

## 2019-07-08 ENCOUNTER — HOSPITAL ENCOUNTER (EMERGENCY)
Age: 74
Discharge: HOME | End: 2019-07-08
Attending: EMERGENCY MEDICINE
Payer: OTHER MISCELLANEOUS

## 2019-07-08 VITALS
RESPIRATION RATE: 16 BRPM | TEMPERATURE: 98.4 F | SYSTOLIC BLOOD PRESSURE: 167 MMHG | HEART RATE: 73 BPM | HEIGHT: 66 IN | BODY MASS INDEX: 25.71 KG/M2 | WEIGHT: 160 LBS | DIASTOLIC BLOOD PRESSURE: 97 MMHG | OXYGEN SATURATION: 96 %
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
## 2019-07-08 DIAGNOSIS — S16.1XXA STRAIN OF NECK MUSCLE, INITIAL ENCOUNTER: ICD-10-CM

## 2019-07-08 DIAGNOSIS — S09.90XA CLOSED HEAD INJURY, INITIAL ENCOUNTER: Primary | ICD-10-CM

## 2019-07-08 PROCEDURE — 99284 EMERGENCY DEPT VISIT MOD MDM: CPT

## 2019-07-08 PROCEDURE — 72125 CT NECK SPINE W/O DYE: CPT

## 2019-07-08 PROCEDURE — 70450 CT HEAD/BRAIN W/O DYE: CPT

## 2019-07-08 ASSESSMENT — ENCOUNTER SYMPTOMS
BACK PAIN: 0
EYES NEGATIVE: 1
ABDOMINAL PAIN: 0
SHORTNESS OF BREATH: 0

## 2019-07-12 ENCOUNTER — TELEPHONE (OUTPATIENT)
Dept: ADMINISTRATIVE | Age: 74
End: 2019-07-12

## 2019-07-17 ENCOUNTER — TELEPHONE (OUTPATIENT)
Dept: FAMILY MEDICINE CLINIC | Age: 74
End: 2019-07-17

## 2019-07-25 ENCOUNTER — TELEPHONE (OUTPATIENT)
Dept: FAMILY MEDICINE CLINIC | Age: 74
End: 2019-07-25

## 2019-08-16 ENCOUNTER — TELEPHONE (OUTPATIENT)
Dept: FAMILY MEDICINE CLINIC | Age: 74
End: 2019-08-16

## 2019-08-30 ENCOUNTER — TELEPHONE (OUTPATIENT)
Dept: FAMILY MEDICINE CLINIC | Age: 74
End: 2019-08-30

## 2019-11-14 ENCOUNTER — APPOINTMENT (OUTPATIENT)
Dept: CT IMAGING | Age: 74
DRG: 871 | End: 2019-11-14
Payer: MEDICARE

## 2019-11-14 ENCOUNTER — HOSPITAL ENCOUNTER (INPATIENT)
Age: 74
LOS: 2 days | Discharge: HOME | DRG: 871 | End: 2019-11-16
Attending: EMERGENCY MEDICINE | Admitting: INTERNAL MEDICINE
Payer: MEDICARE

## 2019-11-14 DIAGNOSIS — G93.40 ENCEPHALOPATHY: ICD-10-CM

## 2019-11-14 DIAGNOSIS — N30.00 ACUTE CYSTITIS WITHOUT HEMATURIA: Primary | ICD-10-CM

## 2019-11-14 LAB
ALBUMIN SERPL-MCNC: 3.9 G/DL (ref 3.5–5.2)
ALP BLD-CCNC: 175 U/L (ref 35–104)
ALT SERPL-CCNC: 42 U/L (ref 0–32)
ANION GAP SERPL CALCULATED.3IONS-SCNC: 14 MMOL/L (ref 7–16)
AST SERPL-CCNC: 48 U/L (ref 0–31)
BACTERIA: ABNORMAL /HPF
BASOPHILS ABSOLUTE: 0.03 E9/L (ref 0–0.2)
BASOPHILS RELATIVE PERCENT: 0.2 % (ref 0–2)
BILIRUB SERPL-MCNC: 0.7 MG/DL (ref 0–1.2)
BILIRUBIN URINE: NEGATIVE
BLOOD, URINE: ABNORMAL
BUN BLDV-MCNC: 14 MG/DL (ref 8–23)
CALCIUM SERPL-MCNC: 9.7 MG/DL (ref 8.6–10.2)
CHLORIDE BLD-SCNC: 100 MMOL/L (ref 98–107)
CLARITY: ABNORMAL
CO2: 26 MMOL/L (ref 22–29)
COLOR: YELLOW
CREAT SERPL-MCNC: 0.8 MG/DL (ref 0.5–1)
EOSINOPHILS ABSOLUTE: 0.08 E9/L (ref 0.05–0.5)
EOSINOPHILS RELATIVE PERCENT: 0.6 % (ref 0–6)
EPITHELIAL CELLS, UA: ABNORMAL /HPF
GFR AFRICAN AMERICAN: >60
GFR NON-AFRICAN AMERICAN: >60 ML/MIN/1.73
GLUCOSE BLD-MCNC: 122 MG/DL (ref 74–99)
GLUCOSE URINE: NEGATIVE MG/DL
HCT VFR BLD CALC: 41.6 % (ref 34–48)
HEMOGLOBIN: 13.1 G/DL (ref 11.5–15.5)
IMMATURE GRANULOCYTES #: 0.06 E9/L
IMMATURE GRANULOCYTES %: 0.5 % (ref 0–5)
KETONES, URINE: NEGATIVE MG/DL
LACTIC ACID, SEPSIS: 1.9 MMOL/L (ref 0.5–1.9)
LEUKOCYTE ESTERASE, URINE: ABNORMAL
LYMPHOCYTES ABSOLUTE: 1.33 E9/L (ref 1.5–4)
LYMPHOCYTES RELATIVE PERCENT: 10.3 % (ref 20–42)
MCH RBC QN AUTO: 30.3 PG (ref 26–35)
MCHC RBC AUTO-ENTMCNC: 31.5 % (ref 32–34.5)
MCV RBC AUTO: 96.3 FL (ref 80–99.9)
MONOCYTES ABSOLUTE: 1.41 E9/L (ref 0.1–0.95)
MONOCYTES RELATIVE PERCENT: 10.9 % (ref 2–12)
NEUTROPHILS ABSOLUTE: 9.97 E9/L (ref 1.8–7.3)
NEUTROPHILS RELATIVE PERCENT: 77.5 % (ref 43–80)
NITRITE, URINE: POSITIVE
PDW BLD-RTO: 13.3 FL (ref 11.5–15)
PH UA: 6 (ref 5–9)
PLATELET # BLD: 287 E9/L (ref 130–450)
PMV BLD AUTO: 9.5 FL (ref 7–12)
POTASSIUM SERPL-SCNC: 4.1 MMOL/L (ref 3.5–5)
PROTEIN UA: 100 MG/DL
RBC # BLD: 4.32 E12/L (ref 3.5–5.5)
RBC UA: ABNORMAL /HPF (ref 0–2)
SODIUM BLD-SCNC: 140 MMOL/L (ref 132–146)
SPECIFIC GRAVITY UA: 1.01 (ref 1–1.03)
TOTAL PROTEIN: 8.9 G/DL (ref 6.4–8.3)
TROPONIN: <0.01 NG/ML (ref 0–0.03)
UROBILINOGEN, URINE: 0.2 E.U./DL
WBC # BLD: 12.9 E9/L (ref 4.5–11.5)
WBC UA: >20 /HPF (ref 0–5)

## 2019-11-14 PROCEDURE — 87040 BLOOD CULTURE FOR BACTERIA: CPT

## 2019-11-14 PROCEDURE — 99285 EMERGENCY DEPT VISIT HI MDM: CPT

## 2019-11-14 PROCEDURE — 70450 CT HEAD/BRAIN W/O DYE: CPT

## 2019-11-14 PROCEDURE — 36415 COLL VENOUS BLD VENIPUNCTURE: CPT

## 2019-11-14 PROCEDURE — 83605 ASSAY OF LACTIC ACID: CPT

## 2019-11-14 PROCEDURE — 87088 URINE BACTERIA CULTURE: CPT

## 2019-11-14 PROCEDURE — 1200000000 HC SEMI PRIVATE

## 2019-11-14 PROCEDURE — 85025 COMPLETE CBC W/AUTO DIFF WBC: CPT

## 2019-11-14 PROCEDURE — 2580000003 HC RX 258: Performed by: EMERGENCY MEDICINE

## 2019-11-14 PROCEDURE — 6370000000 HC RX 637 (ALT 250 FOR IP): Performed by: PHYSICIAN ASSISTANT

## 2019-11-14 PROCEDURE — 81001 URINALYSIS AUTO W/SCOPE: CPT

## 2019-11-14 PROCEDURE — 80053 COMPREHEN METABOLIC PANEL: CPT

## 2019-11-14 PROCEDURE — 93005 ELECTROCARDIOGRAM TRACING: CPT | Performed by: PHYSICIAN ASSISTANT

## 2019-11-14 PROCEDURE — 84484 ASSAY OF TROPONIN QUANT: CPT

## 2019-11-14 PROCEDURE — 96365 THER/PROPH/DIAG IV INF INIT: CPT

## 2019-11-14 PROCEDURE — 2580000003 HC RX 258: Performed by: PHYSICIAN ASSISTANT

## 2019-11-14 PROCEDURE — 6360000002 HC RX W HCPCS: Performed by: EMERGENCY MEDICINE

## 2019-11-14 PROCEDURE — 87186 SC STD MICRODIL/AGAR DIL: CPT

## 2019-11-14 RX ORDER — ACETAMINOPHEN 500 MG
1000 TABLET ORAL ONCE
Status: COMPLETED | OUTPATIENT
Start: 2019-11-14 | End: 2019-11-14

## 2019-11-14 RX ORDER — SODIUM CHLORIDE 9 MG/ML
INJECTION, SOLUTION INTRAVENOUS ONCE
Status: COMPLETED | OUTPATIENT
Start: 2019-11-14 | End: 2019-11-14

## 2019-11-14 RX ORDER — 0.9 % SODIUM CHLORIDE 0.9 %
1000 INTRAVENOUS SOLUTION INTRAVENOUS ONCE
Status: DISCONTINUED | OUTPATIENT
Start: 2019-11-14 | End: 2019-11-14

## 2019-11-14 RX ADMIN — SODIUM CHLORIDE: 9 INJECTION, SOLUTION INTRAVENOUS at 19:13

## 2019-11-14 RX ADMIN — CEFTRIAXONE 2 G: 2 INJECTION, POWDER, FOR SOLUTION INTRAMUSCULAR; INTRAVENOUS at 20:53

## 2019-11-14 RX ADMIN — ACETAMINOPHEN 1000 MG: 500 TABLET ORAL at 19:15

## 2019-11-14 ASSESSMENT — PAIN SCALES - GENERAL
PAINLEVEL_OUTOF10: 6
PAINLEVEL_OUTOF10: 10
PAINLEVEL_OUTOF10: 1

## 2019-11-14 ASSESSMENT — ENCOUNTER SYMPTOMS
DIARRHEA: 0
SHORTNESS OF BREATH: 0
NAUSEA: 0
ABDOMINAL PAIN: 0
VOMITING: 0

## 2019-11-14 ASSESSMENT — PAIN DESCRIPTION - LOCATION: LOCATION: ABDOMEN;GENERALIZED

## 2019-11-14 ASSESSMENT — PAIN DESCRIPTION - DESCRIPTORS: DESCRIPTORS: PATIENT UNABLE TO DESCRIBE

## 2019-11-14 ASSESSMENT — PAIN DESCRIPTION - ORIENTATION: ORIENTATION: LOWER

## 2019-11-14 ASSESSMENT — PAIN DESCRIPTION - ONSET: ONSET: GRADUAL

## 2019-11-14 ASSESSMENT — PAIN - FUNCTIONAL ASSESSMENT: PAIN_FUNCTIONAL_ASSESSMENT: ACTIVITIES ARE NOT PREVENTED

## 2019-11-14 ASSESSMENT — PAIN DESCRIPTION - PROGRESSION: CLINICAL_PROGRESSION: GRADUALLY WORSENING

## 2019-11-14 ASSESSMENT — PAIN DESCRIPTION - PAIN TYPE: TYPE: ACUTE PAIN

## 2019-11-14 ASSESSMENT — PAIN DESCRIPTION - FREQUENCY: FREQUENCY: CONTINUOUS

## 2019-11-15 PROBLEM — G93.41 ACUTE METABOLIC ENCEPHALOPATHY: Status: ACTIVE | Noted: 2019-11-14

## 2019-11-15 PROBLEM — R47.01 EXPRESSIVE APHASIA: Status: ACTIVE | Noted: 2019-11-15

## 2019-11-15 PROBLEM — N39.0 SEPSIS DUE TO URINARY TRACT INFECTION (HCC): Status: ACTIVE | Noted: 2019-11-15

## 2019-11-15 PROBLEM — A41.9 SEPSIS DUE TO URINARY TRACT INFECTION (HCC): Status: ACTIVE | Noted: 2019-11-15

## 2019-11-15 LAB
ALBUMIN SERPL-MCNC: 3.2 G/DL (ref 3.5–5.2)
ALP BLD-CCNC: 148 U/L (ref 35–104)
ALT SERPL-CCNC: 32 U/L (ref 0–32)
ANION GAP SERPL CALCULATED.3IONS-SCNC: 13 MMOL/L (ref 7–16)
AST SERPL-CCNC: 32 U/L (ref 0–31)
BILIRUB SERPL-MCNC: 0.8 MG/DL (ref 0–1.2)
BILIRUBIN DIRECT: 0.4 MG/DL (ref 0–0.3)
BILIRUBIN, INDIRECT: 0.4 MG/DL (ref 0–1)
BUN BLDV-MCNC: 13 MG/DL (ref 8–23)
CALCIUM SERPL-MCNC: 8.7 MG/DL (ref 8.6–10.2)
CHLORIDE BLD-SCNC: 105 MMOL/L (ref 98–107)
CO2: 23 MMOL/L (ref 22–29)
CREAT SERPL-MCNC: 0.8 MG/DL (ref 0.5–1)
EKG ATRIAL RATE: 105 BPM
EKG P AXIS: 8 DEGREES
EKG P-R INTERVAL: 152 MS
EKG Q-T INTERVAL: 334 MS
EKG QRS DURATION: 78 MS
EKG QTC CALCULATION (BAZETT): 441 MS
EKG R AXIS: -3 DEGREES
EKG T AXIS: 47 DEGREES
EKG VENTRICULAR RATE: 105 BPM
GFR AFRICAN AMERICAN: >60
GFR NON-AFRICAN AMERICAN: >60 ML/MIN/1.73
GLUCOSE BLD-MCNC: 118 MG/DL (ref 74–99)
LACTIC ACID: 0.8 MMOL/L (ref 0.5–2.2)
MAGNESIUM: 2.2 MG/DL (ref 1.6–2.6)
POTASSIUM REFLEX MAGNESIUM: 2.9 MMOL/L (ref 3.5–5)
POTASSIUM SERPL-SCNC: 3.6 MMOL/L (ref 3.5–5)
SODIUM BLD-SCNC: 141 MMOL/L (ref 132–146)
TOTAL PROTEIN: 7.4 G/DL (ref 6.4–8.3)

## 2019-11-15 PROCEDURE — 36415 COLL VENOUS BLD VENIPUNCTURE: CPT

## 2019-11-15 PROCEDURE — 84132 ASSAY OF SERUM POTASSIUM: CPT

## 2019-11-15 PROCEDURE — 83735 ASSAY OF MAGNESIUM: CPT

## 2019-11-15 PROCEDURE — 80076 HEPATIC FUNCTION PANEL: CPT

## 2019-11-15 PROCEDURE — 6360000002 HC RX W HCPCS: Performed by: INTERNAL MEDICINE

## 2019-11-15 PROCEDURE — 1200000000 HC SEMI PRIVATE

## 2019-11-15 PROCEDURE — 97165 OT EVAL LOW COMPLEX 30 MIN: CPT

## 2019-11-15 PROCEDURE — 6370000000 HC RX 637 (ALT 250 FOR IP): Performed by: INTERNAL MEDICINE

## 2019-11-15 PROCEDURE — 97161 PT EVAL LOW COMPLEX 20 MIN: CPT

## 2019-11-15 PROCEDURE — 2500000003 HC RX 250 WO HCPCS: Performed by: INTERNAL MEDICINE

## 2019-11-15 PROCEDURE — 80048 BASIC METABOLIC PNL TOTAL CA: CPT

## 2019-11-15 PROCEDURE — 93010 ELECTROCARDIOGRAM REPORT: CPT | Performed by: INTERNAL MEDICINE

## 2019-11-15 PROCEDURE — 99223 1ST HOSP IP/OBS HIGH 75: CPT | Performed by: INTERNAL MEDICINE

## 2019-11-15 PROCEDURE — 83605 ASSAY OF LACTIC ACID: CPT

## 2019-11-15 PROCEDURE — 2580000003 HC RX 258: Performed by: INTERNAL MEDICINE

## 2019-11-15 RX ORDER — METOPROLOL SUCCINATE 25 MG/1
25 TABLET, EXTENDED RELEASE ORAL DAILY
Status: DISCONTINUED | OUTPATIENT
Start: 2019-11-15 | End: 2019-11-16 | Stop reason: HOSPADM

## 2019-11-15 RX ORDER — CLOPIDOGREL BISULFATE 75 MG/1
75 TABLET ORAL DAILY
Status: DISCONTINUED | OUTPATIENT
Start: 2019-11-15 | End: 2019-11-16 | Stop reason: HOSPADM

## 2019-11-15 RX ORDER — SODIUM CHLORIDE 0.9 % (FLUSH) 0.9 %
10 SYRINGE (ML) INJECTION PRN
Status: DISCONTINUED | OUTPATIENT
Start: 2019-11-15 | End: 2019-11-16 | Stop reason: HOSPADM

## 2019-11-15 RX ORDER — DEXTROSE, SODIUM CHLORIDE, AND POTASSIUM CHLORIDE 5; .45; .15 G/100ML; G/100ML; G/100ML
INJECTION INTRAVENOUS CONTINUOUS
Status: DISCONTINUED | OUTPATIENT
Start: 2019-11-15 | End: 2019-11-16 | Stop reason: HOSPADM

## 2019-11-15 RX ORDER — POTASSIUM BICARBONATE 25 MEQ/1
50 TABLET, EFFERVESCENT ORAL ONCE
Status: DISCONTINUED | OUTPATIENT
Start: 2019-11-15 | End: 2019-11-15

## 2019-11-15 RX ORDER — ONDANSETRON 2 MG/ML
4 INJECTION INTRAMUSCULAR; INTRAVENOUS EVERY 6 HOURS PRN
Status: DISCONTINUED | OUTPATIENT
Start: 2019-11-15 | End: 2019-11-16 | Stop reason: HOSPADM

## 2019-11-15 RX ORDER — POTASSIUM CHLORIDE 20 MEQ/1
40 TABLET, EXTENDED RELEASE ORAL PRN
Status: DISCONTINUED | OUTPATIENT
Start: 2019-11-15 | End: 2019-11-16 | Stop reason: HOSPADM

## 2019-11-15 RX ORDER — POTASSIUM CHLORIDE 7.45 MG/ML
10 INJECTION INTRAVENOUS PRN
Status: DISCONTINUED | OUTPATIENT
Start: 2019-11-15 | End: 2019-11-16 | Stop reason: HOSPADM

## 2019-11-15 RX ORDER — GENTAMICIN SULFATE 80 MG/50ML
80 INJECTION, SOLUTION INTRAVENOUS ONCE
Status: COMPLETED | OUTPATIENT
Start: 2019-11-15 | End: 2019-11-15

## 2019-11-15 RX ORDER — ACETAMINOPHEN 325 MG/1
650 TABLET ORAL EVERY 4 HOURS PRN
Status: DISCONTINUED | OUTPATIENT
Start: 2019-11-15 | End: 2019-11-16 | Stop reason: HOSPADM

## 2019-11-15 RX ORDER — SODIUM CHLORIDE 0.9 % (FLUSH) 0.9 %
10 SYRINGE (ML) INJECTION EVERY 12 HOURS SCHEDULED
Status: DISCONTINUED | OUTPATIENT
Start: 2019-11-15 | End: 2019-11-16 | Stop reason: HOSPADM

## 2019-11-15 RX ORDER — MAGNESIUM SULFATE 1 G/100ML
1 INJECTION INTRAVENOUS PRN
Status: DISCONTINUED | OUTPATIENT
Start: 2019-11-15 | End: 2019-11-16 | Stop reason: HOSPADM

## 2019-11-15 RX ADMIN — CLOPIDOGREL BISULFATE 75 MG: 75 TABLET ORAL at 08:33

## 2019-11-15 RX ADMIN — Medication 10 ML: at 20:08

## 2019-11-15 RX ADMIN — ENOXAPARIN SODIUM 40 MG: 40 INJECTION SUBCUTANEOUS at 08:33

## 2019-11-15 RX ADMIN — GENTAMICIN SULFATE 80 MG: 80 INJECTION, SOLUTION INTRAVENOUS at 14:27

## 2019-11-15 RX ADMIN — ACETAMINOPHEN 650 MG: 325 TABLET ORAL at 12:27

## 2019-11-15 RX ADMIN — METOPROLOL SUCCINATE 25 MG: 25 TABLET, EXTENDED RELEASE ORAL at 08:33

## 2019-11-15 RX ADMIN — POTASSIUM CHLORIDE 10 MEQ: 7.46 INJECTION, SOLUTION INTRAVENOUS at 17:04

## 2019-11-15 RX ADMIN — POTASSIUM CHLORIDE 10 MEQ: 7.46 INJECTION, SOLUTION INTRAVENOUS at 09:56

## 2019-11-15 RX ADMIN — POTASSIUM CHLORIDE, DEXTROSE MONOHYDRATE AND SODIUM CHLORIDE: 150; 5; 450 INJECTION, SOLUTION INTRAVENOUS at 12:58

## 2019-11-15 RX ADMIN — Medication 10 ML: at 07:58

## 2019-11-15 RX ADMIN — Medication 10 ML: at 08:34

## 2019-11-15 RX ADMIN — POTASSIUM CHLORIDE 10 MEQ: 7.46 INJECTION, SOLUTION INTRAVENOUS at 15:05

## 2019-11-15 RX ADMIN — POTASSIUM CHLORIDE 10 MEQ: 7.46 INJECTION, SOLUTION INTRAVENOUS at 07:58

## 2019-11-15 RX ADMIN — POTASSIUM CHLORIDE 10 MEQ: 7.46 INJECTION, SOLUTION INTRAVENOUS at 19:07

## 2019-11-15 RX ADMIN — POTASSIUM CHLORIDE 10 MEQ: 7.46 INJECTION, SOLUTION INTRAVENOUS at 12:17

## 2019-11-15 ASSESSMENT — PAIN SCALES - GENERAL
PAINLEVEL_OUTOF10: 0
PAINLEVEL_OUTOF10: 0

## 2019-11-16 VITALS
HEIGHT: 66 IN | TEMPERATURE: 98.4 F | SYSTOLIC BLOOD PRESSURE: 149 MMHG | OXYGEN SATURATION: 98 % | WEIGHT: 170 LBS | DIASTOLIC BLOOD PRESSURE: 75 MMHG | HEART RATE: 70 BPM | RESPIRATION RATE: 16 BRPM | BODY MASS INDEX: 27.32 KG/M2

## 2019-11-16 LAB
ALBUMIN SERPL-MCNC: 2.8 G/DL (ref 3.5–5.2)
ALP BLD-CCNC: 151 U/L (ref 35–104)
ALT SERPL-CCNC: 32 U/L (ref 0–32)
ANION GAP SERPL CALCULATED.3IONS-SCNC: 12 MMOL/L (ref 7–16)
AST SERPL-CCNC: 30 U/L (ref 0–31)
BASOPHILS ABSOLUTE: 0.02 E9/L (ref 0–0.2)
BASOPHILS RELATIVE PERCENT: 0.2 % (ref 0–2)
BILIRUB SERPL-MCNC: 0.4 MG/DL (ref 0–1.2)
BILIRUBIN DIRECT: <0.2 MG/DL (ref 0–0.3)
BILIRUBIN, INDIRECT: ABNORMAL MG/DL (ref 0–1)
BUN BLDV-MCNC: 14 MG/DL (ref 8–23)
CALCIUM SERPL-MCNC: 8.6 MG/DL (ref 8.6–10.2)
CHLORIDE BLD-SCNC: 106 MMOL/L (ref 98–107)
CO2: 22 MMOL/L (ref 22–29)
CREAT SERPL-MCNC: 0.8 MG/DL (ref 0.5–1)
EOSINOPHILS ABSOLUTE: 0.2 E9/L (ref 0.05–0.5)
EOSINOPHILS RELATIVE PERCENT: 1.8 % (ref 0–6)
GFR AFRICAN AMERICAN: >60
GFR NON-AFRICAN AMERICAN: >60 ML/MIN/1.73
GLUCOSE BLD-MCNC: 115 MG/DL (ref 74–99)
HCT VFR BLD CALC: 34.2 % (ref 34–48)
HEMOGLOBIN: 10.8 G/DL (ref 11.5–15.5)
IMMATURE GRANULOCYTES #: 0.05 E9/L
IMMATURE GRANULOCYTES %: 0.5 % (ref 0–5)
LYMPHOCYTES ABSOLUTE: 2.15 E9/L (ref 1.5–4)
LYMPHOCYTES RELATIVE PERCENT: 19.5 % (ref 20–42)
MAGNESIUM: 2.2 MG/DL (ref 1.6–2.6)
MCH RBC QN AUTO: 30.7 PG (ref 26–35)
MCHC RBC AUTO-ENTMCNC: 31.6 % (ref 32–34.5)
MCV RBC AUTO: 97.2 FL (ref 80–99.9)
MONOCYTES ABSOLUTE: 1.54 E9/L (ref 0.1–0.95)
MONOCYTES RELATIVE PERCENT: 14 % (ref 2–12)
NEUTROPHILS ABSOLUTE: 7.04 E9/L (ref 1.8–7.3)
NEUTROPHILS RELATIVE PERCENT: 64 % (ref 43–80)
ORGANISM: ABNORMAL
PDW BLD-RTO: 13.3 FL (ref 11.5–15)
PLATELET # BLD: 230 E9/L (ref 130–450)
PMV BLD AUTO: 9.4 FL (ref 7–12)
POLYCHROMASIA: ABNORMAL
POTASSIUM REFLEX MAGNESIUM: 3.6 MMOL/L (ref 3.5–5)
RBC # BLD: 3.52 E12/L (ref 3.5–5.5)
SODIUM BLD-SCNC: 140 MMOL/L (ref 132–146)
TOTAL PROTEIN: 6.9 G/DL (ref 6.4–8.3)
URINE CULTURE, ROUTINE: ABNORMAL
WBC # BLD: 11 E9/L (ref 4.5–11.5)

## 2019-11-16 PROCEDURE — 6360000002 HC RX W HCPCS: Performed by: INTERNAL MEDICINE

## 2019-11-16 PROCEDURE — 36415 COLL VENOUS BLD VENIPUNCTURE: CPT

## 2019-11-16 PROCEDURE — G0008 ADMIN INFLUENZA VIRUS VAC: HCPCS | Performed by: INTERNAL MEDICINE

## 2019-11-16 PROCEDURE — 85025 COMPLETE CBC W/AUTO DIFF WBC: CPT

## 2019-11-16 PROCEDURE — 90653 IIV ADJUVANT VACCINE IM: CPT | Performed by: INTERNAL MEDICINE

## 2019-11-16 PROCEDURE — 80076 HEPATIC FUNCTION PANEL: CPT

## 2019-11-16 PROCEDURE — 99239 HOSP IP/OBS DSCHRG MGMT >30: CPT | Performed by: INTERNAL MEDICINE

## 2019-11-16 PROCEDURE — 6370000000 HC RX 637 (ALT 250 FOR IP): Performed by: INTERNAL MEDICINE

## 2019-11-16 PROCEDURE — 80048 BASIC METABOLIC PNL TOTAL CA: CPT

## 2019-11-16 PROCEDURE — 83735 ASSAY OF MAGNESIUM: CPT

## 2019-11-16 RX ORDER — CEFUROXIME AXETIL 500 MG/1
500 TABLET ORAL 2 TIMES DAILY
Qty: 14 TABLET | Refills: 0 | Status: SHIPPED | OUTPATIENT
Start: 2019-11-16 | End: 2019-11-26

## 2019-11-16 RX ADMIN — METOPROLOL SUCCINATE 25 MG: 25 TABLET, EXTENDED RELEASE ORAL at 08:59

## 2019-11-16 RX ADMIN — CLOPIDOGREL BISULFATE 75 MG: 75 TABLET ORAL at 08:58

## 2019-11-16 RX ADMIN — INFLUENZA VACCINE, ADJUVANTED 0.5 ML: 15; 15; 15 INJECTION, SUSPENSION INTRAMUSCULAR at 13:30

## 2019-11-16 RX ADMIN — ENOXAPARIN SODIUM 40 MG: 40 INJECTION SUBCUTANEOUS at 08:58

## 2019-11-16 RX ADMIN — ACETAMINOPHEN 650 MG: 325 TABLET ORAL at 02:08

## 2019-11-16 ASSESSMENT — PAIN SCALES - GENERAL
PAINLEVEL_OUTOF10: 0
PAINLEVEL_OUTOF10: 0

## 2019-11-19 LAB — BLOOD CULTURE, ROUTINE: NORMAL

## 2019-11-20 LAB — CULTURE, BLOOD 2: NORMAL

## 2020-03-31 ENCOUNTER — TELEPHONE (OUTPATIENT)
Dept: ADMINISTRATIVE | Age: 75
End: 2020-03-31

## 2020-03-31 NOTE — TELEPHONE ENCOUNTER
Patient called wanting to schedule an appointment advised that on video visits are being done at this time . Patient declined . Patient would like her medications called in please .

## 2020-05-27 ENCOUNTER — OFFICE VISIT (OUTPATIENT)
Dept: FAMILY MEDICINE CLINIC | Age: 75
End: 2020-05-27

## 2020-05-27 VITALS
HEIGHT: 66 IN | HEART RATE: 62 BPM | TEMPERATURE: 98.6 F | SYSTOLIC BLOOD PRESSURE: 120 MMHG | WEIGHT: 164 LBS | DIASTOLIC BLOOD PRESSURE: 80 MMHG | RESPIRATION RATE: 16 BRPM | OXYGEN SATURATION: 96 % | BODY MASS INDEX: 26.36 KG/M2

## 2020-05-27 PROCEDURE — 99214 OFFICE O/P EST MOD 30 MIN: CPT | Performed by: FAMILY MEDICINE

## 2020-05-27 NOTE — PATIENT INSTRUCTIONS
using beans and peas. Add garbanzo or kidney beans to salads. Make burritos and tacos with mashed madrigal beans or black beans. Where can you learn more? Go to [email or web address] and sign in to your [employer] account. Enter D126 in the [employer] box to learn more about \"DASH Diet: Care Instructions. \"     If you do not have an account, please click on the \"Sign Up Now\" link. Current as of: December 16, 2019               Content Version: 12.5  © 7053-8206 Healthwise, Incorporated. Care instructions adapted under license by [facility] ([facility]). If you have questions about a medical condition or this instruction, always ask your healthcare professional. [address] any warranty or liability for your use of this information.

## 2020-05-27 NOTE — PROGRESS NOTES
Hypertension:  Patient is here for follow up chronic hypertension. This is  generally controlled on current medication regimen. Takes meds as directed and tolerates them well. Most recent labs reviewed with patient and are not remarkable. No symptoms from htn standpoint per ROS. Patient is  compliant with lifestyle modifications. Patient does not smoke. Comorbid conditions include HTN, lupus. Son is concerned because his mom was a smoker. She has an occasional cough which she states is from her dentures. Cough is nonproductive. She denies nasal congestion, runny nose, sore throat. Patient's past medical, surgical, social and/or family history reviewed, updated in chart, and are non-contributory (unless otherwise stated). Medications and allergies also reviewed and updated in chart.         Review of Systems:  Constitutional:  No fever, no fatigue, no chills, no headaches, no weight change  Dermatology:  No rash, no mole, no dry or sensitive skin  ENT:  Positive for cough, no sore throat, no sinus pain, no runny nose, no ear pain  Cardiology:  No chest pain, no palpitations, no leg edema, no shortness of breath, no PND  Gastroenterology:  No dysphagia, no abdominal pain, no nausea, no vomiting, no constipation, no diarrhea, no heartburn  Musculoskeletal:  No joint pain, no leg cramps, no back pain, no muscle aches  Respiratory:  No shortness of breath, no orthopnea, no wheezing, no FALCON, no hemoptysis  Urology:  No blood in the urine, no urinary frequency, no urinary incontinence, no urinary urgency, no nocturia, no dysuria      Vitals:    05/27/20 1322 05/27/20 1333 05/27/20 1358   BP: (!) 170/103 (!) 140/90 120/80   Site: Left Upper Arm Left Upper Arm    Position: Sitting Sitting    Cuff Size: Medium Adult Medium Adult    Pulse: 62     Resp: 16     Temp: 98.6 °F (37 °C)     TempSrc: Oral     SpO2: 96%     Weight: 164 lb (74.4 kg)     Height: 5' 6\" (1.676 m)         Physical Exam  Vitals signs

## 2020-07-13 RX ORDER — METOPROLOL SUCCINATE 25 MG/1
25 TABLET, EXTENDED RELEASE ORAL DAILY
Qty: 90 TABLET | Refills: 3 | Status: SHIPPED
Start: 2020-07-13 | End: 2020-11-16 | Stop reason: SDUPTHER

## 2020-07-13 RX ORDER — CLOPIDOGREL BISULFATE 75 MG/1
75 TABLET ORAL DAILY
Qty: 90 TABLET | Refills: 3 | Status: SHIPPED
Start: 2020-07-13 | End: 2020-11-16 | Stop reason: SDUPTHER

## 2020-08-04 ENCOUNTER — OFFICE VISIT (OUTPATIENT)
Dept: FAMILY MEDICINE CLINIC | Age: 75
End: 2020-08-04

## 2020-08-04 VITALS
SYSTOLIC BLOOD PRESSURE: 130 MMHG | BODY MASS INDEX: 26.68 KG/M2 | TEMPERATURE: 98.6 F | DIASTOLIC BLOOD PRESSURE: 80 MMHG | OXYGEN SATURATION: 96 % | HEART RATE: 62 BPM | HEIGHT: 66 IN | WEIGHT: 166 LBS

## 2020-08-04 PROCEDURE — 99214 OFFICE O/P EST MOD 30 MIN: CPT | Performed by: FAMILY MEDICINE

## 2020-08-04 RX ORDER — LORATADINE 10 MG/1
10 TABLET ORAL DAILY
Qty: 90 TABLET | Refills: 1 | Status: ON HOLD
Start: 2020-08-04 | End: 2021-02-19 | Stop reason: SDUPTHER

## 2020-08-04 ASSESSMENT — PATIENT HEALTH QUESTIONNAIRE - PHQ9
SUM OF ALL RESPONSES TO PHQ9 QUESTIONS 1 & 2: 0
SUM OF ALL RESPONSES TO PHQ QUESTIONS 1-9: 0
2. FEELING DOWN, DEPRESSED OR HOPELESS: 0
SUM OF ALL RESPONSES TO PHQ QUESTIONS 1-9: 0
1. LITTLE INTEREST OR PLEASURE IN DOING THINGS: 0

## 2020-08-04 NOTE — PATIENT INSTRUCTIONS
Cold or allergy medicine is usually taken only for a short time until your symptoms clear up. Do not give this medicine to a child younger than 3years old. Always ask a doctor before giving a cough or cold medicine to a child. Death can occur from the misuse of cough and cold medicines in very young children. Loratadine is usually taken once per day. Follow your doctor's instructions. Do not crush, chew, or break the regular tablet. Swallow the pill whole. Measure liquid medicine with the dosing syringe provided, or with a special dose-measuring spoon or medicine cup. If you do not have a dose-measuring device, ask your pharmacist for one. The chewable tablet must be chewed before you swallow it. To take the orally disintegrating tablet (Claritin RediTab, Alavert):  · Keep the tablet in its blister pack until you are ready to take it. Open the package and peel back the foil. Do not push a tablet through the foil or you may damage the tablet. · Use dry hands to remove the tablet and place it in your mouth. · Do not swallow the tablet whole. Allow it to dissolve in your mouth without chewing. If desired, you may drink liquid to help swallow the dissolved tablet. Call your doctor if your symptoms do not improve, or if they get worse. Store at room temperature away from moisture and heat. What happens if I miss a dose? Take the missed dose as soon as you remember. Skip the missed dose if it is almost time for your next scheduled dose. Do not  take extra medicine to make up the missed dose. What happens if I overdose? Seek emergency medical attention or call the Poison Help line at 1-924.822.9308. Overdose symptoms may include headache, drowsiness, and fast or pounding heartbeat. What should I avoid while taking loratadine? Follow your doctor's instructions about any restrictions on food, beverages, or activity. What are the possible side effects of loratadine?   Get emergency medical help if you have not a substitute for, the expertise, skill, knowledge and judgment of healthcare practitioners. The absence of a warning for a given drug or drug combination in no way should be construed to indicate that the drug or drug combination is safe, effective or appropriate for any given patient. [facility] does not assume any responsibility for any aspect of healthcare administered with the aid of information [facility] provides. The information contained herein is not intended to cover all possible uses, directions, precautions, warnings, drug interactions, allergic reactions, or adverse effects. If you have questions about the drugs you are taking, check with your doctor, nurse or pharmacist.  Copyright 9391-1159 [address] [place]. Version: 9.02. Revision date: 2/20/2015. Care instructions adapted under license by [facility] ([facility]). If you have questions about a medical condition or this instruction, always ask your healthcare professional. [place] [address] any warranty or liability for your use of this information.

## 2020-08-04 NOTE — PROGRESS NOTES
signs and nursing note reviewed. Constitutional:       Appearance: She is well-developed. HENT:      Head: Normocephalic and atraumatic. Right Ear: External ear normal.      Left Ear: External ear normal.      Nose: Nose normal.   Eyes:      Conjunctiva/sclera: Conjunctivae normal.      Pupils: Pupils are equal, round, and reactive to light. Neck:      Musculoskeletal: Normal range of motion and neck supple. Thyroid: No thyromegaly. Cardiovascular:      Rate and Rhythm: Normal rate and regular rhythm. Heart sounds: Normal heart sounds. Pulmonary:      Effort: Pulmonary effort is normal.      Breath sounds: Normal breath sounds. No wheezing. Abdominal:      General: Bowel sounds are normal.      Palpations: Abdomen is soft. Tenderness: There is no abdominal tenderness. Musculoskeletal: Normal range of motion. Skin:     General: Skin is warm and dry. Findings: No rash. Neurological:      Mental Status: She is alert and oriented to person, place, and time. Deep Tendon Reflexes: Reflexes are normal and symmetric. Psychiatric:         Behavior: Behavior normal.         Assessment/Plan:      Sukh Servin was seen today for medication refill. Diagnoses and all orders for this visit:    Essential hypertension  -     Comprehensive Metabolic Panel; Future  -     CBC Auto Differential; Future    Allergic rhinitis due to animal hair and dander  -     loratadine (CLARITIN) 10 MG tablet; Take 1 tablet by mouth daily    Breast cancer screening  -     JOLIE DIGITAL SCREEN W OR WO CAD BILATERAL; Future    Colon cancer screening  -     Cologuard; Future    Prediabetes  -     Hemoglobin A1C; Future    Coronary artery disease involving native heart without angina pectoris, unspecified vessel or lesion type  -     Comprehensive Metabolic Panel; Future  -     CBC Auto Differential; Future  -     Lipid Panel; Future      As above.   Call or go to ED immediately if symptoms worsen or persist.  Return in about 3 months (around 11/4/2020) for htn., or sooner if necessary. Educational materials and/or home exercises printed for patient's review and were included in patient instructions on his/her After Visit Summary and given to patient at the end of visit. Counseled regarding above diagnosis, including possible risks and complications,  especially if left uncontrolled. Counseled regarding the possible side effects, risks, benefits and alternatives to treatment; patient and/or guardian verbalizes understanding, agrees, feels comfortable with and wishes to proceed with above treatment plan. Advised patient to call with any new medication issues, and read all Rx info from pharmacy to assure aware of all possible risks and side effects of medication before taking. Reviewed age and gender appropriate health screening exams and vaccinations. Advised patient regarding importance of keeping up with recommended health maintenance and to schedule as soon as possible if overdue, as this is important in assessing for undiagnosed pathology, especially cancer, as well as protecting against potentially harmful/life threatening disease. Patient and/or guardian verbalizes understanding and agrees with above counseling, assessment and plan. All questions answered. Zack Fields DO  8/4/2020    I have personally reviewed and updated the chief complaint, HPI, Past Medical, Family and Social History, as well as the above Review of Systems.

## 2020-08-25 ENCOUNTER — HOSPITAL ENCOUNTER (OUTPATIENT)
Dept: MAMMOGRAPHY | Age: 75
Discharge: HOME | End: 2020-08-27
Payer: MEDICARE

## 2020-11-16 ENCOUNTER — OFFICE VISIT (OUTPATIENT)
Dept: FAMILY MEDICINE CLINIC | Age: 75
End: 2020-11-16

## 2020-11-16 VITALS
OXYGEN SATURATION: 93 % | RESPIRATION RATE: 16 BRPM | BODY MASS INDEX: 27.97 KG/M2 | TEMPERATURE: 97.1 F | SYSTOLIC BLOOD PRESSURE: 152 MMHG | WEIGHT: 174 LBS | HEART RATE: 62 BPM | HEIGHT: 66 IN | DIASTOLIC BLOOD PRESSURE: 92 MMHG

## 2020-11-16 PROCEDURE — 90694 VACC AIIV4 NO PRSRV 0.5ML IM: CPT | Performed by: FAMILY MEDICINE

## 2020-11-16 PROCEDURE — 90471 IMMUNIZATION ADMIN: CPT | Performed by: FAMILY MEDICINE

## 2020-11-16 PROCEDURE — 99214 OFFICE O/P EST MOD 30 MIN: CPT | Performed by: FAMILY MEDICINE

## 2020-11-16 RX ORDER — CLOPIDOGREL BISULFATE 75 MG/1
75 TABLET ORAL DAILY
Qty: 90 TABLET | Refills: 3 | Status: SHIPPED
Start: 2020-11-16 | End: 2021-11-30 | Stop reason: SDUPTHER

## 2020-11-16 RX ORDER — METOPROLOL SUCCINATE 25 MG/1
25 TABLET, EXTENDED RELEASE ORAL DAILY
Qty: 90 TABLET | Refills: 3 | Status: SHIPPED
Start: 2020-11-16 | End: 2021-11-30 | Stop reason: SDUPTHER

## 2020-11-16 NOTE — PROGRESS NOTES
Hypertension:  Patient is here for follow up chronic hypertension. This is  generally controlled on current medication regimen. Takes meds as directed and tolerates them well. Most recent labs reviewed with patient and are not remarkable. No symptoms from htn standpoint per ROS. Patient is  compliant with lifestyle modifications. Patient does not smoke. Comorbid conditions include HTN, CAD. She ran out of her metoprolol so has not taken anything for her blood pressure. Patient's past medical, surgical, social and/or family history reviewed, updated in chart, and are non-contributory (unless otherwise stated). Medications and allergies also reviewed and updated in chart. Review of Systems:  Constitutional:  No fever, no fatigue, no chills, no headaches, no weight change  Dermatology:  No rash, no mole, no dry or sensitive skin  ENT:  No cough, no sore throat, no sinus pain, no runny nose, no ear pain  Cardiology:  No chest pain, no palpitations, no leg edema, no shortness of breath, no PND  Gastroenterology:  No dysphagia, no abdominal pain, no nausea, no vomiting, no constipation, no diarrhea, no heartburn  Musculoskeletal:  No joint pain, no leg cramps, no back pain, no muscle aches  Respiratory:  No shortness of breath, no orthopnea, no wheezing, no FALCON, no hemoptysis  Urology:  No blood in the urine, no urinary frequency, no urinary incontinence, no urinary urgency, no nocturia, no dysuria        Vitals:    11/16/20 1613 11/16/20 1629   BP: (!) 160/103 (!) 152/92   Pulse: 62    Resp: 16    Temp: 97.1 °F (36.2 °C)    TempSrc: Infrared    SpO2: 93%    Weight: 174 lb (78.9 kg)    Height: 5' 6\" (1.676 m)        Physical Exam  Vitals signs and nursing note reviewed. Constitutional:       Appearance: She is well-developed. HENT:      Head: Normocephalic and atraumatic.       Right Ear: External ear normal.      Left Ear: External ear normal.      Nose: Nose normal.   Eyes: Conjunctiva/sclera: Conjunctivae normal.      Pupils: Pupils are equal, round, and reactive to light. Neck:      Musculoskeletal: Normal range of motion and neck supple. Thyroid: No thyromegaly. Cardiovascular:      Rate and Rhythm: Normal rate and regular rhythm. Heart sounds: Normal heart sounds. Pulmonary:      Effort: Pulmonary effort is normal.      Breath sounds: Normal breath sounds. No wheezing. Abdominal:      General: Bowel sounds are normal.      Palpations: Abdomen is soft. Tenderness: There is no abdominal tenderness. Musculoskeletal: Normal range of motion. Skin:     General: Skin is warm and dry. Findings: No rash. Neurological:      Mental Status: She is alert and oriented to person, place, and time. Deep Tendon Reflexes: Reflexes are normal and symmetric. Psychiatric:         Behavior: Behavior normal.         Assessment/Plan:      Taylor Maya was seen today for hypertension. Diagnoses and all orders for this visit:    Essential hypertension  -     metoprolol succinate (TOPROL XL) 25 MG extended release tablet; Take 1 tablet by mouth daily  -     Comprehensive Metabolic Panel; Future  -     CBC Auto Differential; Future    Coronary artery disease involving native heart without angina pectoris, unspecified vessel or lesion type  -     clopidogrel (PLAVIX) 75 MG tablet; Take 1 tablet by mouth daily  -     metoprolol succinate (TOPROL XL) 25 MG extended release tablet; Take 1 tablet by mouth daily  -     Comprehensive Metabolic Panel; Future  -     CBC Auto Differential; Future  -     Hemoglobin A1C; Future  -     Lipid Panel; Future    Encounter for screening mammogram for malignant neoplasm of breast  -     JOLIE DIGITAL SCREEN W OR WO CAD BILATERAL; Future    Other myocardial infarction type ([facility] [address]   -     Hemoglobin A1C; Future    Need for influenza vaccination  -     INFLUENZA, QUADV, ADJUVANTED, 65 YRS =, IM, PF, PREFILL SYR, 0.5ML (FLUAD)      As above.   Call or go to ED immediately if symptoms worsen or persist.  Return in about 1 week (around 11/23/2020) for htn., or sooner if necessary. Educational materials and/or home exercises printed for patient's review and were included in patient instructions on his/her After Visit Summary and given to patient at the end of visit. Counseled regarding above diagnosis, including possible risks and complications,  especially if left uncontrolled. Counseled regarding the possible side effects, risks, benefits and alternatives to treatment; patient and/or guardian verbalizes understanding, agrees, feels comfortable with and wishes to proceed with above treatment plan. Advised patient to call with any new medication issues, and read all Rx info from pharmacy to assure aware of all possible risks and side effects of medication before taking. Reviewed age and gender appropriate health screening exams and vaccinations. Advised patient regarding importance of keeping up with recommended health maintenance and to schedule as soon as possible if overdue, as this is important in assessing for undiagnosed pathology, especially cancer, as well as protecting against potentially harmful/life threatening disease. Patient and/or guardian verbalizes understanding and agrees with above counseling, assessment and plan. All questions answered. Lyla Jesus,   11/16/2020    I have personally reviewed and updated the chief complaint, HPI, Past Medical, Family and Social History, as well as the above Review of Systems.

## 2020-11-16 NOTE — PATIENT INSTRUCTIONS
Patient Education        Influenza (Flu) Vaccine (Inactivated or Recombinant): What You Need to Know  Why get vaccinated? Influenza vaccine can prevent influenza (flu). Flu is a contagious disease that spreads around the [place] every year, usually between October and May. Anyone can get the flu, but it is more dangerous for some people. Infants and young children, people 72years of age and older, pregnant women, and people with certain health conditions or a weakened immune system are at greatest risk of flu complications. Pneumonia, bronchitis, sinus infections and ear infections are examples of flu-related complications. If you have a medical condition, such as heart disease, cancer or diabetes, flu can make it worse. Flu can cause fever and chills, sore throat, muscle aches, fatigue, cough, headache, and runny or stuffy nose. Some people may have vomiting and diarrhea, though this is more common in children than adults. Each year, thousands of people in the [facility] die from flu, and many more are hospitalized. Flu vaccine prevents millions of illnesses and flu-related visits to the doctor each year. Influenza vaccine  CDC recommends everyone 10months of age and older get vaccinated every flu season. Children 6 months through 6years of age may need 2 doses during a single flu season. Everyone else needs only 1 dose each flu season. It takes about 2 weeks for protection to develop after vaccination. There are many flu viruses, and they are always changing. Each year a new flu vaccine is made to protect against three or four viruses that are likely to cause disease in the upcoming flu season. Even when the vaccine doesn't exactly match these viruses, it may still provide some protection. Influenza vaccine does not cause flu. Influenza vaccine may be given at the same time as other vaccines.   Talk with your health care provider  Tell your vaccine provider if the person getting the vaccine:  · Has had an allergic reaction after a previous dose of influenza vaccine, or has any severe, life-threatening allergies. · Has ever had Guillain-Barré Syndrome (also called GBS). In some cases, your health care provider may decide to postpone influenza vaccination to a future visit. People with minor illnesses, such as a cold, may be vaccinated. People who are moderately or severely ill should usually wait until they recover before getting influenza vaccine. Your health care provider can give you more information. Risks of a vaccine reaction  · Soreness, redness, and swelling where shot is given, fever, muscle aches, and headache can happen after influenza vaccine. · There may be a very small increased risk of Guillain-Barré Syndrome (GBS) after inactivated influenza vaccine (the flu shot). [employer] children who get the flu shot along with pneumococcal vaccine (PCV13), and/or DTaP vaccine at the same time might be slightly more likely to have a seizure caused by fever. Tell your health care provider if a child who is getting flu vaccine has ever had a seizure. People sometimes faint after medical procedures, including vaccination. Tell your provider if you feel dizzy or have vision changes or ringing in the ears. As with any medicine, there is a very remote chance of a vaccine causing a severe allergic reaction, other serious injury, or death. What if there is a serious problem? An allergic reaction could occur after the vaccinated person leaves the clinic. If you see signs of a severe allergic reaction (hives, swelling of the face and throat, difficulty breathing, a fast heartbeat, dizziness, or weakness), call 9-1-1 and get the person to the nearest hospital.  For other signs that concern you, call your health care provider. Adverse reactions should be reported to the Vaccine Adverse Event Reporting System (VAERS).  Your health care provider will usually file this report, or you can do it

## 2020-11-23 ENCOUNTER — OFFICE VISIT (OUTPATIENT)
Dept: FAMILY MEDICINE CLINIC | Age: 75
End: 2020-11-23

## 2020-11-23 VITALS
HEIGHT: 66 IN | BODY MASS INDEX: 28.03 KG/M2 | HEART RATE: 74 BPM | DIASTOLIC BLOOD PRESSURE: 88 MMHG | SYSTOLIC BLOOD PRESSURE: 138 MMHG | WEIGHT: 174.4 LBS | OXYGEN SATURATION: 92 % | RESPIRATION RATE: 16 BRPM | TEMPERATURE: 96.8 F

## 2020-11-23 DIAGNOSIS — I25.10 CORONARY ARTERY DISEASE INVOLVING NATIVE HEART WITHOUT ANGINA PECTORIS, UNSPECIFIED VESSEL OR LESION TYPE: ICD-10-CM

## 2020-11-23 DIAGNOSIS — I21.A9 OTHER MYOCARDIAL INFARCTION TYPE (HCC): ICD-10-CM

## 2020-11-23 DIAGNOSIS — I10 ESSENTIAL HYPERTENSION: ICD-10-CM

## 2020-11-23 LAB
BASOPHILS ABSOLUTE: 0.04 E9/L (ref 0–0.2)
BASOPHILS RELATIVE PERCENT: 0.6 % (ref 0–2)
EOSINOPHILS ABSOLUTE: 0.22 E9/L (ref 0.05–0.5)
EOSINOPHILS RELATIVE PERCENT: 3.3 % (ref 0–6)
HCT VFR BLD CALC: 46.2 % (ref 34–48)
HEMOGLOBIN: 14.6 G/DL (ref 11.5–15.5)
IMMATURE GRANULOCYTES #: 0.01 E9/L
IMMATURE GRANULOCYTES %: 0.2 % (ref 0–5)
LYMPHOCYTES ABSOLUTE: 2.51 E9/L (ref 1.5–4)
LYMPHOCYTES RELATIVE PERCENT: 37.7 % (ref 20–42)
MCH RBC QN AUTO: 30.3 PG (ref 26–35)
MCHC RBC AUTO-ENTMCNC: 31.6 % (ref 32–34.5)
MCV RBC AUTO: 95.9 FL (ref 80–99.9)
MONOCYTES ABSOLUTE: 0.8 E9/L (ref 0.1–0.95)
MONOCYTES RELATIVE PERCENT: 12 % (ref 2–12)
NEUTROPHILS ABSOLUTE: 3.08 E9/L (ref 1.8–7.3)
NEUTROPHILS RELATIVE PERCENT: 46.2 % (ref 43–80)
PDW BLD-RTO: 13 FL (ref 11.5–15)
PLATELET # BLD: 254 E9/L (ref 130–450)
PMV BLD AUTO: 10.6 FL (ref 7–12)
RBC # BLD: 4.82 E12/L (ref 3.5–5.5)
WBC # BLD: 6.7 E9/L (ref 4.5–11.5)

## 2020-11-23 PROCEDURE — 99213 OFFICE O/P EST LOW 20 MIN: CPT | Performed by: FAMILY MEDICINE

## 2020-11-23 NOTE — PROGRESS NOTES
Hypertension:  Patient is here for follow up chronic hypertension. This is not generally controlled on current medication regimen. Takes meds as directed and tolerates them well. Most recent labs reviewed with patient and are not remarkable. No symptoms from htn standpoint per ROS. Patient is  compliant with lifestyle modifications. Patient does not smoke. Comorbid conditions include none. Patient's past medical, surgical, social and/or family history reviewed, updated in chart, and are non-contributory (unless otherwise stated). Medications and allergies also reviewed and updated in chart. Review of Systems:  Constitutional:  No fever, no fatigue, no chills, no headaches, no weight change  Dermatology:  No rash, no mole, no dry or sensitive skin  ENT:  No cough, no sore throat, no sinus pain, no runny nose, no ear pain  Cardiology:  No chest pain, no palpitations, no leg edema, no shortness of breath, no PND  Gastroenterology:  No dysphagia, no abdominal pain, no nausea, no vomiting, no constipation, no diarrhea, no heartburn  Musculoskeletal:  No joint pain, no leg cramps, no back pain, no muscle aches  Respiratory:  No shortness of breath, no orthopnea, no wheezing, no FALCON, no hemoptysis  Urology:  No blood in the urine, no urinary frequency, no urinary incontinence, no urinary urgency, no nocturia, no dysuria        Vitals:    11/23/20 1558   BP: 138/88   Site: Right Upper Arm   Position: Standing   Cuff Size: Medium Adult   Pulse: 74   Resp: 16   Temp: 96.8 °F (36 °C)   TempSrc: Infrared   SpO2: 92%   Weight: 174 lb 6.4 oz (79.1 kg)   Height: 5' 6\" (1.676 m)       Physical Exam  Vitals signs and nursing note reviewed. Constitutional:       Appearance: She is well-developed. HENT:      Head: Normocephalic and atraumatic.       Right Ear: External ear normal.      Left Ear: External ear normal.      Nose: Nose normal.   Eyes:      Conjunctiva/sclera: Conjunctivae normal.      Pupils: appropriate health screening exams and vaccinations. Advised patient regarding importance of keeping up with recommended health maintenance and to schedule as soon as possible if overdue, as this is important in assessing for undiagnosed pathology, especially cancer, as well as protecting against potentially harmful/life threatening disease. Patient and/or guardian verbalizes understanding and agrees with above counseling, assessment and plan. All questions answered. Mahad Parada,   11/23/2020    I have personally reviewed and updated the chief complaint, HPI, Past Medical, Family and Social History, as well as the above Review of Systems.

## 2020-11-23 NOTE — PATIENT INSTRUCTIONS
serving is 1 slice of bread, 1 ounce of dry cereal, or ½ cup of cooked rice, pasta, or cooked cereal. Try to choose whole-grain products as much as possible. · Limit lean meat, poultry, and fish to 2 servings each day. A serving is 3 ounces, about the size of a deck of cards. · Eat 4 to 5 servings of nuts, seeds, and legumes (cooked dried beans, lentils, and split peas) each week. A serving is 1/3 cup of nuts, 2 tablespoons of seeds, or ½ cup of cooked beans or peas. · Limit fats and oils to 2 to 3 servings each day. A serving is 1 teaspoon of vegetable oil or 2 tablespoons of salad dressing. · Limit sweets and added sugars to 5 servings or less a week. A serving is 1 tablespoon jelly or jam, ½ cup sorbet, or 1 cup of lemonade. · Eat less than 2,300 milligrams (mg) of sodium a day. If you limit your sodium to 1,500 mg a day, you can lower your blood pressure even more. Tips for success  · Start small. Do not try to make dramatic changes to your diet all at once. You might feel that you are missing out on your favorite foods and then be more likely to not follow the plan. Make small changes, and stick with them. Once those changes become habit, add a few more changes. · Try some of the following:  ? Make it a goal to eat a fruit or vegetable at every meal and at snacks. This will make it easy to get the recommended amount of fruits and vegetables each day. ? Try yogurt topped with fruit and nuts for a snack or healthy dessert. ? Add lettuce, tomato, cucumber, and onion to sandwiches. ? Combine a ready-made pizza crust with low-fat mozzarella cheese and lots of vegetable toppings. Try using tomatoes, squash, spinach, broccoli, carrots, cauliflower, and onions. ? Have a variety of cut-up vegetables with a low-fat dip as an appetizer instead of chips and dip. ? Sprinkle sunflower seeds or chopped almonds over salads. Or try adding chopped walnuts or almonds to cooked vegetables.   ? Try some vegetarian meals using beans and peas. Add garbanzo or kidney beans to salads. Make burritos and tacos with mashed madrigal beans or black beans. Where can you learn more? Go to [email or web address]://[email or web address]. org and sign in to your [employer] account. Enter Y457 in the [employer] box to learn more about \"DASH Diet: Care Instructions. \"     If you do not have an account, please click on the \"Sign Up Now\" link. Current as of: December 16, 2019               Content Version: 12.6  © 3555-4641 [employer]. Care instructions adapted under license by [facility] [place] ([facility]). If you have questions about a medical condition or this instruction, always ask your healthcare professional. [address] any warranty or liability for your use of this information.

## 2020-11-24 LAB
CHOLESTEROL, TOTAL: 157 MG/DL (ref 0–199)
HBA1C MFR BLD: 5.9 % (ref 4–5.6)
HDLC SERPL-MCNC: 43 MG/DL
LDL CHOLESTEROL CALCULATED: 85 MG/DL (ref 0–99)
TRIGL SERPL-MCNC: 147 MG/DL (ref 0–149)
VLDLC SERPL CALC-MCNC: 29 MG/DL

## 2021-02-15 ENCOUNTER — HOSPITAL ENCOUNTER (OUTPATIENT)
Age: 76
Setting detail: OBSERVATION
LOS: 1 days | Discharge: NURSING, REHAB OR LONG-TERM CARE | End: 2021-02-20
Attending: EMERGENCY MEDICINE | Admitting: INTERNAL MEDICINE

## 2021-02-15 ENCOUNTER — APPOINTMENT (OUTPATIENT)
Dept: CT IMAGING | Age: 76
End: 2021-02-15

## 2021-02-15 ENCOUNTER — APPOINTMENT (OUTPATIENT)
Dept: GENERAL RADIOLOGY | Age: 76
End: 2021-02-15

## 2021-02-15 DIAGNOSIS — R26.2 UNABLE TO AMBULATE: ICD-10-CM

## 2021-02-15 DIAGNOSIS — M25.552 LEFT HIP PAIN: Primary | ICD-10-CM

## 2021-02-15 LAB
ANION GAP SERPL CALCULATED.3IONS-SCNC: 8 MMOL/L (ref 7–16)
BASOPHILS ABSOLUTE: 0.03 E9/L (ref 0–0.2)
BASOPHILS RELATIVE PERCENT: 0.3 % (ref 0–2)
BUN BLDV-MCNC: 14 MG/DL (ref 8–23)
C-REACTIVE PROTEIN: 4 MG/DL (ref 0–0.4)
CALCIUM SERPL-MCNC: 9.5 MG/DL (ref 8.6–10.2)
CHLORIDE BLD-SCNC: 104 MMOL/L (ref 98–107)
CO2: 27 MMOL/L (ref 22–29)
CREAT SERPL-MCNC: 1 MG/DL (ref 0.5–1)
EKG ATRIAL RATE: 80 BPM
EKG P AXIS: 37 DEGREES
EKG P-R INTERVAL: 180 MS
EKG Q-T INTERVAL: 396 MS
EKG QRS DURATION: 84 MS
EKG QTC CALCULATION (BAZETT): 456 MS
EKG R AXIS: 30 DEGREES
EKG T AXIS: 36 DEGREES
EKG VENTRICULAR RATE: 80 BPM
EOSINOPHILS ABSOLUTE: 0.07 E9/L (ref 0.05–0.5)
EOSINOPHILS RELATIVE PERCENT: 0.6 % (ref 0–6)
GFR AFRICAN AMERICAN: >60
GFR NON-AFRICAN AMERICAN: >60 ML/MIN/1.73
GLUCOSE BLD-MCNC: 119 MG/DL (ref 74–99)
HCT VFR BLD CALC: 47.1 % (ref 34–48)
HEMOGLOBIN: 15.2 G/DL (ref 11.5–15.5)
IMMATURE GRANULOCYTES #: 0.07 E9/L
IMMATURE GRANULOCYTES %: 0.6 % (ref 0–5)
LYMPHOCYTES ABSOLUTE: 1.69 E9/L (ref 1.5–4)
LYMPHOCYTES RELATIVE PERCENT: 14.9 % (ref 20–42)
MCH RBC QN AUTO: 31 PG (ref 26–35)
MCHC RBC AUTO-ENTMCNC: 32.3 % (ref 32–34.5)
MCV RBC AUTO: 96.1 FL (ref 80–99.9)
MONOCYTES ABSOLUTE: 1.32 E9/L (ref 0.1–0.95)
MONOCYTES RELATIVE PERCENT: 11.7 % (ref 2–12)
NEUTROPHILS ABSOLUTE: 8.15 E9/L (ref 1.8–7.3)
NEUTROPHILS RELATIVE PERCENT: 71.9 % (ref 43–80)
PDW BLD-RTO: 13.2 FL (ref 11.5–15)
PLATELET # BLD: 236 E9/L (ref 130–450)
PMV BLD AUTO: 9.7 FL (ref 7–12)
POTASSIUM REFLEX MAGNESIUM: 4.3 MMOL/L (ref 3.5–5)
RBC # BLD: 4.9 E12/L (ref 3.5–5.5)
SEDIMENTATION RATE, ERYTHROCYTE: 80 MM/HR (ref 0–20)
SODIUM BLD-SCNC: 139 MMOL/L (ref 132–146)
TROPONIN: <0.01 NG/ML (ref 0–0.03)
WBC # BLD: 11.3 E9/L (ref 4.5–11.5)

## 2021-02-15 PROCEDURE — 93005 ELECTROCARDIOGRAM TRACING: CPT | Performed by: STUDENT IN AN ORGANIZED HEALTH CARE EDUCATION/TRAINING PROGRAM

## 2021-02-15 PROCEDURE — 93010 ELECTROCARDIOGRAM REPORT: CPT | Performed by: INTERNAL MEDICINE

## 2021-02-15 PROCEDURE — 73552 X-RAY EXAM OF FEMUR 2/>: CPT

## 2021-02-15 PROCEDURE — 6360000002 HC RX W HCPCS: Performed by: INTERNAL MEDICINE

## 2021-02-15 PROCEDURE — 36415 COLL VENOUS BLD VENIPUNCTURE: CPT

## 2021-02-15 PROCEDURE — 96372 THER/PROPH/DIAG INJ SC/IM: CPT

## 2021-02-15 PROCEDURE — 6360000002 HC RX W HCPCS: Performed by: STUDENT IN AN ORGANIZED HEALTH CARE EDUCATION/TRAINING PROGRAM

## 2021-02-15 PROCEDURE — 86140 C-REACTIVE PROTEIN: CPT

## 2021-02-15 PROCEDURE — 85651 RBC SED RATE NONAUTOMATED: CPT

## 2021-02-15 PROCEDURE — 96374 THER/PROPH/DIAG INJ IV PUSH: CPT

## 2021-02-15 PROCEDURE — 99284 EMERGENCY DEPT VISIT MOD MDM: CPT

## 2021-02-15 PROCEDURE — 71045 X-RAY EXAM CHEST 1 VIEW: CPT

## 2021-02-15 PROCEDURE — 99222 1ST HOSP IP/OBS MODERATE 55: CPT | Performed by: INTERNAL MEDICINE

## 2021-02-15 PROCEDURE — 85025 COMPLETE CBC W/AUTO DIFF WBC: CPT

## 2021-02-15 PROCEDURE — 80048 BASIC METABOLIC PNL TOTAL CA: CPT

## 2021-02-15 PROCEDURE — 2580000003 HC RX 258: Performed by: INTERNAL MEDICINE

## 2021-02-15 PROCEDURE — 72192 CT PELVIS W/O DYE: CPT

## 2021-02-15 PROCEDURE — 73521 X-RAY EXAM HIPS BI 2 VIEWS: CPT

## 2021-02-15 PROCEDURE — 1200000000 HC SEMI PRIVATE

## 2021-02-15 PROCEDURE — 84484 ASSAY OF TROPONIN QUANT: CPT

## 2021-02-15 PROCEDURE — 6370000000 HC RX 637 (ALT 250 FOR IP): Performed by: INTERNAL MEDICINE

## 2021-02-15 PROCEDURE — 96376 TX/PRO/DX INJ SAME DRUG ADON: CPT

## 2021-02-15 RX ORDER — FENTANYL CITRATE 50 UG/ML
25 INJECTION, SOLUTION INTRAMUSCULAR; INTRAVENOUS ONCE
Status: COMPLETED | OUTPATIENT
Start: 2021-02-15 | End: 2021-02-15

## 2021-02-15 RX ORDER — FENTANYL CITRATE 50 UG/ML
50 INJECTION, SOLUTION INTRAMUSCULAR; INTRAVENOUS ONCE
Status: DISCONTINUED | OUTPATIENT
Start: 2021-02-15 | End: 2021-02-15

## 2021-02-15 RX ORDER — PROMETHAZINE HYDROCHLORIDE 25 MG/1
12.5 TABLET ORAL EVERY 6 HOURS PRN
Status: DISCONTINUED | OUTPATIENT
Start: 2021-02-15 | End: 2021-02-20 | Stop reason: HOSPADM

## 2021-02-15 RX ORDER — ONDANSETRON 2 MG/ML
4 INJECTION INTRAMUSCULAR; INTRAVENOUS EVERY 6 HOURS PRN
Status: DISCONTINUED | OUTPATIENT
Start: 2021-02-15 | End: 2021-02-20 | Stop reason: HOSPADM

## 2021-02-15 RX ORDER — CLOPIDOGREL BISULFATE 75 MG/1
75 TABLET ORAL DAILY
Status: DISCONTINUED | OUTPATIENT
Start: 2021-02-15 | End: 2021-02-20 | Stop reason: HOSPADM

## 2021-02-15 RX ORDER — HYDROCODONE BITARTRATE AND ACETAMINOPHEN 7.5; 325 MG/1; MG/1
1 TABLET ORAL EVERY 6 HOURS PRN
Status: DISCONTINUED | OUTPATIENT
Start: 2021-02-15 | End: 2021-02-20 | Stop reason: HOSPADM

## 2021-02-15 RX ORDER — SODIUM CHLORIDE 0.9 % (FLUSH) 0.9 %
10 SYRINGE (ML) INJECTION PRN
Status: DISCONTINUED | OUTPATIENT
Start: 2021-02-15 | End: 2021-02-20 | Stop reason: HOSPADM

## 2021-02-15 RX ORDER — ACETAMINOPHEN 650 MG/1
650 SUPPOSITORY RECTAL EVERY 6 HOURS PRN
Status: DISCONTINUED | OUTPATIENT
Start: 2021-02-15 | End: 2021-02-20 | Stop reason: HOSPADM

## 2021-02-15 RX ORDER — ACETAMINOPHEN 325 MG/1
650 TABLET ORAL EVERY 6 HOURS PRN
Status: DISCONTINUED | OUTPATIENT
Start: 2021-02-15 | End: 2021-02-20 | Stop reason: HOSPADM

## 2021-02-15 RX ORDER — CETIRIZINE HYDROCHLORIDE 10 MG/1
5 TABLET ORAL DAILY
Status: DISCONTINUED | OUTPATIENT
Start: 2021-02-15 | End: 2021-02-20 | Stop reason: HOSPADM

## 2021-02-15 RX ORDER — METOPROLOL SUCCINATE 25 MG/1
25 TABLET, EXTENDED RELEASE ORAL DAILY
Status: DISCONTINUED | OUTPATIENT
Start: 2021-02-15 | End: 2021-02-20 | Stop reason: HOSPADM

## 2021-02-15 RX ORDER — SODIUM CHLORIDE 0.9 % (FLUSH) 0.9 %
10 SYRINGE (ML) INJECTION EVERY 12 HOURS SCHEDULED
Status: DISCONTINUED | OUTPATIENT
Start: 2021-02-15 | End: 2021-02-20 | Stop reason: HOSPADM

## 2021-02-15 RX ORDER — POLYETHYLENE GLYCOL 3350 17 G/17G
17 POWDER, FOR SOLUTION ORAL DAILY PRN
Status: DISCONTINUED | OUTPATIENT
Start: 2021-02-15 | End: 2021-02-20 | Stop reason: HOSPADM

## 2021-02-15 RX ADMIN — FENTANYL CITRATE 25 MCG: 50 INJECTION, SOLUTION INTRAMUSCULAR; INTRAVENOUS at 04:44

## 2021-02-15 RX ADMIN — ENOXAPARIN SODIUM 40 MG: 40 INJECTION SUBCUTANEOUS at 14:48

## 2021-02-15 RX ADMIN — METOPROLOL SUCCINATE 25 MG: 25 TABLET, FILM COATED, EXTENDED RELEASE ORAL at 14:49

## 2021-02-15 RX ADMIN — HYDROCODONE BITARTRATE AND ACETAMINOPHEN 1 TABLET: 7.5; 325 TABLET ORAL at 14:48

## 2021-02-15 RX ADMIN — FENTANYL CITRATE 25 MCG: 50 INJECTION, SOLUTION INTRAMUSCULAR; INTRAVENOUS at 06:23

## 2021-02-15 RX ADMIN — SODIUM CHLORIDE, PRESERVATIVE FREE 10 ML: 5 INJECTION INTRAVENOUS at 20:31

## 2021-02-15 RX ADMIN — CETIRIZINE HYDROCHLORIDE 5 MG: 10 TABLET, FILM COATED ORAL at 14:48

## 2021-02-15 RX ADMIN — CLOPIDOGREL BISULFATE 75 MG: 75 TABLET ORAL at 14:48

## 2021-02-15 ASSESSMENT — ENCOUNTER SYMPTOMS
SINUS PRESSURE: 0
ABDOMINAL PAIN: 0
EYE DISCHARGE: 0
WHEEZING: 0
CONSTIPATION: 0
EYE PAIN: 0
BACK PAIN: 0
EYE REDNESS: 0
VOMITING: 0
COUGH: 0
NAUSEA: 0
ABDOMINAL DISTENTION: 0
BLOOD IN STOOL: 0
SORE THROAT: 0
DIARRHEA: 0
SHORTNESS OF BREATH: 0

## 2021-02-15 ASSESSMENT — PAIN DESCRIPTION - FREQUENCY
FREQUENCY: CONTINUOUS
FREQUENCY: CONTINUOUS

## 2021-02-15 ASSESSMENT — PAIN SCALES - GENERAL
PAINLEVEL_OUTOF10: 9
PAINLEVEL_OUTOF10: 5
PAINLEVEL_OUTOF10: 10

## 2021-02-15 ASSESSMENT — PAIN DESCRIPTION - ORIENTATION
ORIENTATION: RIGHT;LEFT;LOWER;MID
ORIENTATION: LEFT

## 2021-02-15 ASSESSMENT — PAIN DESCRIPTION - PROGRESSION: CLINICAL_PROGRESSION: NOT CHANGED

## 2021-02-15 ASSESSMENT — PAIN DESCRIPTION - ONSET: ONSET: GRADUAL

## 2021-02-15 ASSESSMENT — PAIN - FUNCTIONAL ASSESSMENT: PAIN_FUNCTIONAL_ASSESSMENT: PREVENTS OR INTERFERES WITH MANY ACTIVE NOT PASSIVE ACTIVITIES

## 2021-02-15 NOTE — CONSULTS
HEENT:  Normocephalic, atraumatic. Pupils equal, round, reactive to light. No scleral icterus. No conjunctival injection. Normal lips, teeth, and gums. No nasal discharge. Neck:  Supple  Heart:  RRR, no murmurs, gallops, or rubs  Lungs:  CTA bilaterally, bilat symmetrical expansion, no wheeze, rales, or rhonchi  Abdomen: Bowel sounds present, soft, nontender, no masses, no organomegaly, no peritoneal signs  Extremities:  No clubbing, cyanosis, or edema. All compartments soft. TTP over affected area. Joints are w/out effusion  Skin:  Warm and dry, no open lesions or rash  Neuro:  Cranial nerves 2-12 intact, no focal deficits    Log roll negative  Pain with hip ROM  NV intact     LABS:  CBC:         Lab Results   Component Value Date     WBC 11.3 02/15/2021     RBC 4.90 02/15/2021     HGB 15.2 02/15/2021     HCT 47.1 02/15/2021     MCV 96.1 02/15/2021     MCH 31.0 02/15/2021     MCHC 32.3 02/15/2021     RDW 13.2 02/15/2021      02/15/2021     MPV 9.7 02/15/2021            ASSESSMENT:    1. Left hip degenerative joint disease  2. Left hip labral calcification  3. Difficulty ambulating       Xrays and CT scan reviewed show calcified labrum, no fracture/dislocation. Degenerative changes are seen.     PLAN:     1. WBAT  2. PT for ROM and gait training  3. NSAIDs  4.  Follow up in office for injection prn pain

## 2021-02-15 NOTE — CONSULTS
Orthopaedic Consultation  Gadiel Ruiz PA-C      CHIEF COMPLAINT:  Left hip pain    History of Present Illness: Patient states 3 days ago her left hip started to hurt her. No injury, no prior hip pain. She has difficulty walking due to pain. Past Medical History:   Diagnosis Date    Arthritis     Brain aneurysm     Hypertension     Lupus ([address]          Past Surgical History:   Procedure Laterality Date    APPENDECTOMY      BRAIN ANEURYSM SURGERY      6     CHOLECYSTECTOMY  4/8/2016    cholelithias    INCONTINENCE SURGERY         Medications Prior to Admission:    Medications Prior to Admission: Influenza Vac High-Dose Quad (FLUZONE) 0.7 ML SURY injection, Inject 0.7 mLs into the muscle once *GIVEN AT DR. Kaity Solomon OFFICE*  clopidogrel (PLAVIX) 75 MG tablet, Take 1 tablet by mouth daily  metoprolol succinate (TOPROL XL) 25 MG extended release tablet, Take 1 tablet by mouth daily  loratadine (CLARITIN) 10 MG tablet, Take 1 tablet by mouth daily    Allergies:    Aspirin, Dye [iodides], and Penicillins    Social History:    reports that she quit smoking about 34 years ago. She started smoking about 55 years ago. She has a 20.00 pack-year smoking history. She has never used smokeless tobacco. She reports that she does not drink alcohol or use drugs. Family History:   Family history is unknown by patient. REVIEW OF SYSTEMS:  As above in the HPI, otherwise negative    PHYSICAL EXAM:    Vitals:  BP (!) 183/103   Pulse 74   Temp 99.1 °F (37.3 °C) (Oral)   Resp 16   Ht 5' 6\" (1.676 m)   Wt 176 lb 6.4 oz (80 kg)   SpO2 95%   BMI 28.47 kg/m²     General:  Awake, alert, oriented X 3. Well developed, well nourished, well groomed. No apparent distress. HEENT:  Normocephalic, atraumatic. Pupils equal, round, reactive to light. No scleral icterus. No conjunctival injection. Normal lips, teeth, and gums. No nasal discharge.   Neck:  Supple  Heart:  RRR, no murmurs, gallops, or rubs

## 2021-02-15 NOTE — PROGRESS NOTES
Database complete. Medications clarified with son Alex Dany and [employer]. Care plans and education initiated.

## 2021-02-15 NOTE — ED PROVIDER NOTES
79-year-old female presents ED with complaint of left hip pain brought in by EMS. Patient and son states she is had left hip pain for approximately 2 days. They deny any falls, denies any blood thinners patient is on Plavix. States the patient has a very hard time walking because of this, it has been getting progressively worse the pain and she was able to walk initially but is now unable to ambulate. Patient denies any abdominal pain, change the bladder or bowel habits. However she does elicit having chest pain in the lower chest that is very slight she states is unsure of how long it has been going on for. Otherwise patient denies any fevers, chills, nausea, vomiting. Denies any recent illnesses. The patient presents with left hip pain that has been going on for 2 days. These symptoms are moderate in severity. Symptoms are made better by nothing. Symptoms are made worse by nothing. Associated symptoms include chest pain. Patient is a poor historian, history is gathered from the son and the patient. The history is provided by the patient, a relative and medical records. Review of Systems   Constitutional: Negative for chills and fever. HENT: Negative for ear pain, sinus pressure and sore throat. Eyes: Negative for pain, discharge and redness. Respiratory: Negative for cough, shortness of breath and wheezing. Cardiovascular: Positive for chest pain. Gastrointestinal: Negative for abdominal distention, abdominal pain, blood in stool, constipation, diarrhea, nausea and vomiting. Genitourinary: Negative for dysuria and frequency. Musculoskeletal: Negative for arthralgias and back pain. Skin: Negative for rash and wound. Neurological: Negative for weakness and headaches. Hematological: Negative for adenopathy. Psychiatric/Behavioral: Negative for agitation and behavioral problems. All other systems reviewed and are negative.        Physical Exam Vitals signs and nursing note reviewed. Constitutional:       Appearance: Normal appearance. She is normal weight. She is not ill-appearing or toxic-appearing. HENT:      Head: Normocephalic and atraumatic. Eyes:      General: No scleral icterus. Conjunctiva/sclera: Conjunctivae normal.   Cardiovascular:      Rate and Rhythm: Normal rate and regular rhythm. Pulses: Normal pulses. Heart sounds: Normal heart sounds. No murmur. No gallop. Pulmonary:      Effort: Pulmonary effort is normal. No respiratory distress. Breath sounds: Normal breath sounds. No wheezing or rales. Abdominal:      General: Abdomen is flat. Bowel sounds are normal. There is no distension. Palpations: Abdomen is soft. Tenderness: There is no abdominal tenderness. There is no guarding. Musculoskeletal:         General: Tenderness present. No swelling or deformity. Comments: Pain to palpation at the left hip, pain on rolling of the left leg and lifting of the left leg as well. Skin:     General: Skin is warm and dry. Capillary Refill: Capillary refill takes less than 2 seconds. Neurological:      General: No focal deficit present. Mental Status: She is alert and oriented to person, place, and time. Psychiatric:         Mood and Affect: Mood normal.         Thought Content:  Thought content normal.          Procedures     MDM  Number of Diagnoses or Management Options Diagnosis management comments: 79-year-old female presents ED with complaint of left hip pain and unable to ambulate. Patient does have bilateral sensation and strength in her lower extremities she does have significant pain at the left hip joint and pain on elevating left leg and rolling the left leg. Due to this did get x-rays of the patient's hip and work-up started the patient patient's imaging results were unremarkable for any acute pathology. And lab work was also unremarkable for any acute abnormalities. Did try to ambulate the patient following this however she was still unable to ambulate and could not lift her leg off the bed however was moving her foot and her knee. Due to this did order a CAT scan of the patient's pelvis for further evaluation. Decision is to admit the patient following this repeat imaging for further evaluation. Patient was agreeable to be admitted to the hospital at this time. Did sign out the patient to the oncoming resident as well to follow the patient's imaging results and admission. Amount and/or Complexity of Data Reviewed  Clinical lab tests: reviewed  Tests in the radiology section of CPT®: reviewed  Tests in the medicine section of CPT®: reviewed               EKG:  This EKG is signed by emergency department physician. Rate: 80  Rhythm: Sinus  Interpretation: No acute ST elevation or depression, normal sinus rhythm, normal axis. Comparison: stable as compared to patient's most recent EKG            --------------------------------------------- PAST HISTORY ---------------------------------------------  Past Medical History:  has a past medical history of Arthritis, Brain aneurysm, Hypertension, and Lupus ([facility] [address] Past Surgical History:  has a past surgical history that includes Appendectomy; Incontinence surgery; Cholecystectomy (4/8/2016); and Brain aneurysm surgery. Social History:  reports that she quit smoking about 34 years ago. She started smoking about 55 years ago. She has a 20.00 pack-year smoking history. She has never used smokeless tobacco. She reports that she does not drink alcohol or use drugs. Family History: family history includes No Known Problems in her father and mother. The patients home medications have been reviewed.     Allergies: Aspirin, Dye [iodides], and Penicillins    -------------------------------------------------- RESULTS -------------------------------------------------    LABS:  Results for orders placed or performed during the hospital encounter of 43/29/[birth year]   Basic Metabolic Panel w/ Reflex to MG   Result Value Ref Range    Sodium 139 132 - 146 mmol/L    Potassium reflex Magnesium 4.3 3.5 - 5.0 mmol/L    Chloride 104 98 - 107 mmol/L    CO2 27 22 - 29 mmol/L    Anion Gap 8 7 - 16 mmol/L    Glucose 119 (H) 74 - 99 mg/dL    BUN 14 8 - 23 mg/dL    CREATININE 1.0 0.5 - 1.0 mg/dL    GFR Non-African American >60 >=60 mL/min/1.73    GFR African American >60     Calcium 9.5 8.6 - 10.2 mg/dL   CBC Auto Differential   Result Value Ref Range    WBC 11.3 4.5 - 11.5 E9/L    RBC 4.90 3.50 - 5.50 E12/L    Hemoglobin 15.2 11.5 - 15.5 g/dL    Hematocrit 47.1 34.0 - 48.0 %    MCV 96.1 80.0 - 99.9 fL    MCH 31.0 26.0 - 35.0 pg    MCHC 32.3 32.0 - 34.5 %    RDW 13.2 11.5 - 15.0 fL    Platelets 308 410 - 192 E9/L    MPV 9.7 7.0 - 12.0 fL    Neutrophils % 71.9 43.0 - 80.0 %    Immature Granulocytes % 0.6 0.0 - 5.0 %    Lymphocytes % 14.9 (L) 20.0 - 42.0 %    Monocytes % 11.7 2.0 - 12.0 %    Eosinophils % 0.6 0.0 - 6.0 %    Basophils % 0.3 0.0 - 2.0 %    Neutrophils Absolute 8.15 (H) 1.80 - 7.30 E9/L    Immature Granulocytes # 0.07 E9/L    Lymphocytes Absolute 1.69 1.50 - 4.00 E9/L    Monocytes Absolute 1.32 (H) 0.10 - 0.95 E9/L    Eosinophils Absolute 0.07 0.05 - 0.50 E9/L    Basophils Absolute 0.03 0.00 - 0.20 E9/L   Troponin   Result Value Ref Range Troponin <0.01 0.00 - 0.03 ng/mL   EKG 12 Lead   Result Value Ref Range    Ventricular Rate 80 BPM    Atrial Rate 80 BPM    P-R Interval 180 ms    QRS Duration 84 ms    Q-T Interval 396 ms    QTc Calculation (Bazett) 456 ms    P Axis 37 degrees    R Axis 30 degrees    T Axis 36 degrees       RADIOLOGY:  XR HIP BILATERAL W AP PELVIS (2 VIEWS)   Final Result   There are hip labral calcifications bilaterally. No other significant   degenerative finding. Pelvic calcifications may represent calcified uterine fibroid. XR CHEST PORTABLE   Final Result   There is no acute abnormality seen. XR FEMUR LEFT (MIN 2 VIEWS)    (Results Pending)   CT PELVIS WO CONTRAST Additional Contrast? None    (Results Pending)         ------------------------- NURSING NOTES AND VITALS REVIEWED ---------------------------  Date / Time Roomed:  2/15/2021  3:18 AM  ED Bed Assignment:  17/17    The nursing notes within the ED encounter and vital signs as below have been reviewed. Patient Vitals for the past 24 hrs:   BP Temp Temp src Pulse Resp SpO2 Height Weight   02/15/21 0546 (!) 146/86   71 18 95 %     02/15/21 0421 (!) 156/89   78 18 94 %     02/15/21 0322 (!) 145/91 98.3 °F (36.8 °C) Oral 83 10 94 % 5' 6\" (1.676 m) 170 lb (77.1 kg)       Oxygen Saturation Interpretation: Normal    ------------------------------------------ PROGRESS NOTES ------------------------------------------  Re-evaluation(s):  Time: 1800  Patients symptoms show no change  Repeat physical examination is not changed    Counseling:  I have spoken with the patient and discussed todays results, in addition to providing specific details for the plan of care and counseling regarding the diagnosis and prognosis.   Their questions are answered at this time and they are agreeable with the plan of admission.    --------------------------------- ADDITIONAL PROVIDER NOTES --------------------------------- This patient has remained hemodynamically stable during their ED course. Diagnosis:  1. Left hip pain        Disposition:  Patient's disposition: Admit following CT scan of the patient's pelvis  Patient's condition is stable.          Jorje Oakley MD  Resident  02/15/21 [id number]

## 2021-02-15 NOTE — ED NOTES
upon assessment, this pts seems to have difficutly articulating sentences. Unsure if this is baseline fore pt, Dr. Su Merida notified to assess.  Will continue to monitor     Carmelo Potter RN  02/15/21 2152

## 2021-02-15 NOTE — ED NOTES
JANNETHAR faxed to floor spoke with Riana Aguero, copy of fax verificarion received and in chart, pt. Is chimed.      Maribell Magaña RN  02/15/21 [id number]

## 2021-02-15 NOTE — H&P
[place] [address] [facility]   History and Physical      CHIEF COMPLAINT: Left hip pain    History of Present Illness:  70 y.o. female with a history of hypertension, arthritis, lupus, brain aneurysm, though patient understands, obeys commands, dose have significant difficulty in communication, cannot find words for long time. Most of the information obtained by ED physician talking to the son who is not present at this time, likely aphasia after CVA, presents with inability to walk. Patient does show left hip as the area with pain. Empathetically denies any fall. Patient was referred for inpatient management due to inability to walk. Patient does say that she has had MRI before    Informant(s) for H&P: Patient as well as current chart including information given by the ED physician. REVIEW OF SYSTEMS:  no fevers, chills, cp, sob, n/v, ha, vision/hearing changes, wt changes, hot/cold flashes, other open skin lesions, diarrhea, constipation, dysuria/hematuria unless noted in HPI. Complete ROS performed with the patient and is otherwise negative. PMH:  Past Medical History:   Diagnosis Date    Arthritis     Brain aneurysm     Hypertension     Lupus ([address]        Surgical History:  Past Surgical History:   Procedure Laterality Date    APPENDECTOMY      BRAIN ANEURYSM SURGERY      6     CHOLECYSTECTOMY  4/8/2016    cholelithias    INCONTINENCE SURGERY         Medications Prior to Admission:    Prior to Admission medications    Medication Sig Start Date End Date Taking?  Authorizing Provider   clopidogrel (PLAVIX) 75 MG tablet Take 1 tablet by mouth daily 11/16/20   Erin Pickup, DO   metoprolol succinate (TOPROL XL) 25 MG extended release tablet Take 1 tablet by mouth daily 11/16/20   Erin Pickup, DO   loratadine (CLARITIN) 10 MG tablet Take 1 tablet by mouth daily 8/4/20   Erin Pickup, DO CREATININE 1.0   GLUCOSE 119*   CALCIUM 9.5       Recent Labs     02/15/21  0427   WBC 11.3   RBC 4.90   HGB 15.2   HCT 47.1   MCV 96.1   MCH 31.0   MCHC 32.3   RDW 13.2      MPV 9.7     X-ray hip with labral calcifications  Chest x-ray negative  Left femur once again labral calcification  CT hip also has not shown any acute fracture or dislocation, 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 Troponin negative      Radiology: Xr Hip Bilateral W Ap Pelvis (2 Views)    Result Date: 2/15/2021  EXAMINATION: ONE XRAY VIEW OF THE PELVIS AND TWO XRAY VIEWS OF EACH OF THE BILATERAL HIPS 2/15/2021 4:57 am COMPARISON: None. HISTORY: ORDERING SYSTEM PROVIDED HISTORY: left hip pain TECHNOLOGIST PROVIDED HISTORY: Reason for exam:->left hip pain FINDINGS: Pelvis is intact. No fracture identified. There calcified labrum involving both hips. There is no significant joint narrowing. There is no hip fracture or dislocation. Pelvic calcifications may represent calcified uterine fibroid. There are hip labral calcifications bilaterally. No other significant degenerative finding. Pelvic calcifications may represent calcified uterine fibroid. Xr Femur Left (min 2 Views)    Result Date: 2/15/2021  EXAMINATION: AP and lateral XRAY VIEWS OF THE LEFT FEMUR 2/15/2021 4:57 am COMPARISON: None. HISTORY: ORDERING SYSTEM PROVIDED HISTORY: left hip pain TECHNOLOGIST PROVIDED HISTORY: Reason for exam:->left hip pain FINDINGS: There is calcification in the labrum at the hip. There is no significant joint narrowing. There is no hip fracture or dislocation. The distal femur is intact and unremarkable. Labral calcification.     Ct Pelvis Wo Contrast Additional Contrast? None    Result Date: 2/15/2021 EXAMINATION: CT OF THE PELVIS WITHOUT CONTRAST 2/15/2021 8:28 am TECHNIQUE: CT of the pelvis was performed without the administration of intravenous contrast.  Multiplanar reformatted images are provided for review. Dose modulation, iterative reconstruction, and/or weight based adjustment of the mA/kV was utilized to reduce the radiation dose to as low as reasonably achievable. COMPARISON: Plain films of the pelvis and bilateral hips dated 02/15/2021, about 3 hours prior, CT abdomen and pelvis dated 06/16/2019 HISTORY: ORDERING SYSTEM PROVIDED HISTORY: left hip pain TECHNOLOGIST PROVIDED HISTORY: Reason for exam:->left hip pain Additional Contrast?->None Decision Support Exception->Emergency Medical Condition (MA) FINDINGS: There is no evidence of acute fracture or dislocation. No osseous destructive lesion is seen. The hip joint spaces are well maintained with mild spurring. There is soft tissue calcification involving the labrum bilaterally, likely degenerative. This is similar compared to the prior CT study. No periarticular erosions seen. There is degenerative change in the lower lumbar spine. There is a retroverted uterus with multiple calcified fibroids in the fundus. Otherwise, no pelvic mass, lymphadenopathy or free fluid is seen. There are sigmoid diverticula, without evidence of diverticulitis. 1. No acute fracture or dislocation. 2. Mild degenerative change about both hips. Degenerative calcification of the labrum seen bilaterally. Xr Chest Portable    Result Date: 2/15/2021  EXAMINATION: ONE XRAY VIEW OF THE CHEST 2/15/2021 4:57 am COMPARISON: 01/24/2017 HISTORY: ORDERING SYSTEM PROVIDED HISTORY: chest pain TECHNOLOGIST PROVIDED HISTORY: Reason for exam:->chest pain FINDINGS: Heart size is normal. Pulmonary vasculature is not congested. Mediastinal and hilar contours are acceptable. The lungs are clear. The pleural spaces are clear. There is no pneumothorax.

## 2021-02-15 NOTE — ED NOTES
Son Artelia Essex) updated at this time.  Questions and concerns addressed     Nathanael Lozano RN  02/15/21 0532

## 2021-02-16 ENCOUNTER — APPOINTMENT (OUTPATIENT)
Dept: ULTRASOUND IMAGING | Age: 76
End: 2021-02-16

## 2021-02-16 LAB
APTT: 38.3 SEC (ref 24.5–35.1)
D DIMER: 715 NG/ML DDU
INR BLD: 1.2
PROTHROMBIN TIME: 13.5 SEC (ref 9.3–12.4)

## 2021-02-16 PROCEDURE — 99225 PR SBSQ OBSERVATION CARE/DAY 25 MINUTES: CPT | Performed by: INTERNAL MEDICINE

## 2021-02-16 PROCEDURE — 36415 COLL VENOUS BLD VENIPUNCTURE: CPT

## 2021-02-16 PROCEDURE — 2580000003 HC RX 258: Performed by: INTERNAL MEDICINE

## 2021-02-16 PROCEDURE — 85378 FIBRIN DEGRADE SEMIQUANT: CPT

## 2021-02-16 PROCEDURE — 97165 OT EVAL LOW COMPLEX 30 MIN: CPT

## 2021-02-16 PROCEDURE — 93971 EXTREMITY STUDY: CPT

## 2021-02-16 PROCEDURE — G0378 HOSPITAL OBSERVATION PER HR: HCPCS

## 2021-02-16 PROCEDURE — 97110 THERAPEUTIC EXERCISES: CPT

## 2021-02-16 PROCEDURE — 6360000002 HC RX W HCPCS: Performed by: INTERNAL MEDICINE

## 2021-02-16 PROCEDURE — 6370000000 HC RX 637 (ALT 250 FOR IP): Performed by: INTERNAL MEDICINE

## 2021-02-16 PROCEDURE — 85610 PROTHROMBIN TIME: CPT

## 2021-02-16 PROCEDURE — 85730 THROMBOPLASTIN TIME PARTIAL: CPT

## 2021-02-16 PROCEDURE — 97161 PT EVAL LOW COMPLEX 20 MIN: CPT

## 2021-02-16 PROCEDURE — 96372 THER/PROPH/DIAG INJ SC/IM: CPT

## 2021-02-16 RX ADMIN — CLOPIDOGREL BISULFATE 75 MG: 75 TABLET ORAL at 08:48

## 2021-02-16 RX ADMIN — METOPROLOL SUCCINATE 25 MG: 25 TABLET, FILM COATED, EXTENDED RELEASE ORAL at 08:48

## 2021-02-16 RX ADMIN — ENOXAPARIN SODIUM 40 MG: 40 INJECTION SUBCUTANEOUS at 14:15

## 2021-02-16 RX ADMIN — HYDROCODONE BITARTRATE AND ACETAMINOPHEN 1 TABLET: 7.5; 325 TABLET ORAL at 15:32

## 2021-02-16 RX ADMIN — SODIUM CHLORIDE, PRESERVATIVE FREE 10 ML: 5 INJECTION INTRAVENOUS at 20:38

## 2021-02-16 RX ADMIN — CETIRIZINE HYDROCHLORIDE 5 MG: 10 TABLET, FILM COATED ORAL at 08:47

## 2021-02-16 RX ADMIN — SODIUM CHLORIDE, PRESERVATIVE FREE 10 ML: 5 INJECTION INTRAVENOUS at 08:48

## 2021-02-16 ASSESSMENT — PAIN SCALES - GENERAL
PAINLEVEL_OUTOF10: 5
PAINLEVEL_OUTOF10: 8

## 2021-02-16 NOTE — PROGRESS NOTES
Occupational Therapy  OCCUPATIONAL THERAPY INITIAL EVALUATION      Date:2021  Patient Name: Beverly Galvan  MRN: [id number]  : 1945  Room: [address] [place] [address]-A    Referring Provider: Cynthia Rodriguez MD    Evaluating OT: Jim Hall OTR/L [id number]    AM-PAC Daily Activity Raw Score:     Recommended Adaptive Equipment:  TBD     Diagnosis: Unable to ambulate    Pertinent Medical History: brain aneurysm, lupus   Precautions:  Falls, alarm, WBAT L LE     Per chart:  Home Living: Pt lives with sons (one currently in hospital). 2 story with 4 steps/rail to enter   Bed/bath on 2nd floor    PLOF: ?? Pain Level: patient c/o pain with L LE movement ;   Cognition: alert, oriented to person (unable to id  place or year) and minimal conversing (aphasia) , speaks in low tone  , at times mumbled.    Followed simple command   Memory:  Impaired    Sequencing:  poor   Problem solving:  poor   Judgement/safety:  poor     Functional Assessment:   Initial Eval Status  Date: 21 Treatment Status  Date: STGs = LTGs  Time frame: 7-10 days   Feeding SBA/set-up     Grooming Mod A,seated  SBA   UB Dressing Mod A   SBA    LB Dressing Dependent   Mod A    Bathing Max A   Mod a    Toileting Dependent      Bed Mobility  Max A  Supine to partial sit EOB   Patient resistive to sitting up  Max A to return to bed and reposition   Patient would move R LE with minimal assist, total assist with moving L LE  Inconsistent at times       Functional Transfers Returned to bed   C/o increase pain with minimal L LE movement   Mod a    Functional Mobility NT   Mod A with fair + tolerance    Balance Sitting:     Static:  Max A- attempting to sit up in bed   Standing: NT   CGA   with good tolerance    Activity Tolerance Poor+ with light activity   Pain limiting in tolerance   Good with ADL activity    Visual/  Perceptual Glasses: yes                  Strength ROM Additional Info: []Splinting/positioning needs to maintain joint/skin integrity and prevent contractures  [x]Therapeutic activity to improve functional performance during ADLs. [x]Therapeutic exercise to improve tolerance and functional strength for ADLs   [x]Balance retraining/tolerance tasks for facilitation of postural control with dynamic challenges during ADLs . []Neuromuscular re-education: facilitation of righting/equilibrium reactions, midline orientation, scapular stability/mobility, Normalization muscle     tone and facilitation active functional movement/Attention                         []Delirium prevention/treatment    [x]Positioning to improve functional independence  []Other:       Rehab Potential:  Good for established goals     Patient / Family Goal: none stated       Patient and/or family were instructed on functional diagnosis, prognosis/goals and OT plan of care. Demonstrated poor understanding. Eval Complexity: Low      Time In:1455  Time Out: 1414        Min Units   OT Eval Low 97165 x 1   OT Eval Medium 37276      OT Eval High 83676       OT Re-Eval [id number]       Therapeutic Ex 58379       Therapeutic Activities 85599       ADL/Self Care 76538       Orthotic Management 86480       Neuro Re-Ed 80448       Non-Billable Time          Evaluation Time includes thorough review of current medical information, gathering information on past medical history/social history and prior level of function, completion of standardized testing/informal observation of tasks, assessment of data and education on plan of care and goals.             Garrett Victoria OTR/L [id number]

## 2021-02-16 NOTE — PROGRESS NOTES
When assessing patient for pain, she is unable to tell me what hurts but was pointing to her left oblique area. She also cries out when we touch her left leg or behind her knee. Left leg is swollen, warm and more red then her right leg.  Will update doctor     Electronically signed by Briseyda Jacobsen RN on 2/16/2021 at 8:50 AM

## 2021-02-16 NOTE — CONSULTS
[address] [place] [address] [facility]  Consultation Note     Admit Date: 2/15/2021  3:18 AM    Reason for Consult: Patient with acute change. Left thigh pain. High CRP. Left lateral upper thigh tender. Possible trochanteric bursitis    Attending Physician:  Yrn Leggett*    HISTORY OF PRESENT ILLNESS:             The history is obtained from extensive review of available past medical records. The patient is a 70 y.o. female who presents to the ED on 2/15/2021 with with a 2-day history of left hip pain. No history of fall. Patient has had a hard time ambulating. She had a positive leg roll and tenderness on palpation of the left hip. Her white count is normal.  She has been afebrile. The patient has a history of stroke and has expressive aphasia but can answer simple questions. She has been afebrile. CRP was slightly elevated. She was seen by orthopedic surgery. CT showed a calcified labrum but no fracture or dislocation. Degenerative changes seen. She was prescribed nonsteroidals. Past Medical History:        Diagnosis Date    Arthritis     Brain aneurysm     Hypertension     [address] [address]      Past Surgical History:        Procedure Laterality Date    APPENDECTOMY      BRAIN ANEURYSM SURGERY      6     CHOLECYSTECTOMY  4/8/2016    cholelithias    INCONTINENCE SURGERY       Current Medications:   Scheduled Meds:   clopidogrel  75 mg Oral Daily    cetirizine  5 mg Oral Daily    metoprolol succinate  25 mg Oral Daily    sodium chloride flush  10 mL Intravenous 2 times per day    enoxaparin  40 mg Subcutaneous Daily     Continuous Infusions:  PRN Meds:sodium chloride flush, promethazine **OR** ondansetron, polyethylene glycol, acetaminophen **OR** acetaminophen, HYDROcodone-acetaminophen    Allergies:  Aspirin, Dye [iodides], and Penicillins    Social History:   Social History     Socioeconomic History    Marital status:       Spouse name: None Vitals:   BP (!) 150/78   Pulse 80   Temp 99.9 °F (37.7 °C) (Oral)   Resp 18   Ht 5' 6\" (1.676 m)   Wt 173 lb 4.8 oz (78.6 kg)   SpO2 92%   BMI 27.97 kg/m²   Constitutional: The patient is awake, alert, and oriented. Lying in bed. She is in no distress. Slightly apprehensive. Skin: Warm and dry. No rashes were noted. HEENT: Eyes show round, and reactive pupils. No jaundice. Moist mucous membranes, no ulcerations, no thrush. Neck: Supple to movements. No lymphadenopathy. Chest: No use of accessory muscles to breathe. Symmetrical expansion. Auscultation reveals no wheezing, crackles, or rhonchi. Cardiovascular: S1 and S2 are rhythmic and regular. No murmurs appreciated. Abdomen: Positive bowel sounds to auscultation. Benign to palpation. No masses felt. No hepatosplenomegaly. Extremities: No edema. There is some tenderness on palpation of the left hip externally. She does exhibit signs of pain when the hip is passively rotated. No erythema.   Lines: peripheral      CBC+dif:  Recent Labs     02/15/21  0427   WBC 11.3   HGB 15.2   HCT 47.1   MCV 96.1      NEUTROABS 8.15*     Lab Results   Component Value Date    CRP 4.0 (H) 02/15/2021    CRP 1.7 (H) 06/18/2015      No results found for: CRPHS  Lab Results   Component Value Date    SEDRATE 80 (H) 02/15/2021     Lab Results   Component Value Date    ALT 32 11/16/2019    AST 30 11/16/2019    ALKPHOS 151 (H) 11/16/2019    BILITOT 0.4 11/16/2019     Lab Results   Component Value Date     02/15/2021    K 4.3 02/15/2021     02/15/2021    CO2 27 02/15/2021    BUN 14 02/15/2021    CREATININE 1.0 02/15/2021    GFRAA >60 02/15/2021    LABGLOM >60 02/15/2021    GLUCOSE 119 02/15/2021    GLUCOSE 117 02/28/2012    PROT 6.9 11/16/2019    LABALBU 2.8 11/16/2019    CALCIUM 9.5 02/15/2021    BILITOT 0.4 11/16/2019    ALKPHOS 151 11/16/2019    AST 30 11/16/2019    ALT 32 11/16/2019       Lab Results   Component Value Date Thank you for having us see this patient in consultation. I was unable to reach interventional radiology. I spoke with the radiologist and he kindly reviewed the CT.   I will consult interventional radiology to see if they could do a CT-guided aspiration    Maddy Tovar  3:39 PM  2/16/2021

## 2021-02-16 NOTE — PROGRESS NOTES
Physical Therapy    Facility/Department: [facility] MED SURG  Initial Assessment    NAME: Vanessa Jaimes  : 1945  MRN: [id number]    Date of Service: 2021      Attending Provider:  Aaron Mcrae*    Evaluating PT:  Amber Prasad, P.T. Room #:  7977/9472-H  Diagnosis:  Unable to amb, L side and LLE pain  Pertinent PMHx/PSHx:  Brain aneurysm with surgery, lupus, aphasic  Precautions:  WBAT LLE, falls, bed/chair alarm    SUBJECTIVE:    Pt lives with her 2 sons (one is in the hospital) in a 2 story home with 4 stairs and 1 rail to enter. There are 12 steps and 1 rail to 2nd floor bed and bath. Pt ambulated with no AD PTA. OBJECTIVE:   Initial Evaluation  Date: 21 Treatment Short Term/ Long Term   Goals   Was pt agreeable to Eval/treatment? yes     Does pt have pain? C/o pain at rest L side below ribs, and posterior L knee with knee ext. Bed Mobility  NA, pt unable to move due to pain. Independent   Transfers NA  supervision   Ambulation   NA  150 feet with AAD if needed SBA   Stair negotiation: ascended and descended NA  12 steps with 1 rail SBA   AM-PAC 6 Clicks        BLE ROM is WFL. RLE strength is grossly 3/5 to 4-/5 and LLE is grossly 2/5 to 3-/5. Edema:  None noted  Balance: NA  Endurance: poor    Therapeutic Exercises:  Pt was instructed in/performed supine exercises with the BLE: ankle PF/DF 10 reps each, heel slides and quad sets AAROM 10 each, hip ABD/ADD AAROM 10 reps each. Patient education  Pt educated on above exs.     Patient response to education:   Pt verbalized understanding Pt demonstrated skill Pt requires further education in this area   yes yes yes     ASSESSMENT: Comments:  Pt was found in bed and is aphasic, but able to answer most questions and requires increased time to find words most of the time. Pt was asked to point to where her pain was lying in bed and she pointed to L obliques below her ribs. She did not point to her L hip. She appears to have a mild rash LLE and when performing RLE exs she had no c/o pain other than what she called a stretch pain with R knee ext. Pt c/o posterior L knee pain with L knee ext that was more than a stretch. She had no c/o L hip pain with movement of L hip with FLEX/EXT/ABD/ADD. Pt has TTP along anterior, posterior, and lateral LLE as well as along L obliques. She did not have pain along rectus abdominus. Pt attempted to sit up in EOB and was unable to do so b/o increased pain. Treatment:  Patient practiced and was instructed in the following treatment:    ? Above exs to improve BLE ROM and attempt to decrease spasm and pain. Pt was left supine in bed with call light left by patient. Chair/bed alarm: bed alarm was re-activated. Pt's/ family goals   1. To decrease her pain and go home. Patient and or family understand(s) diagnosis, prognosis, and plan of care. PLAN OF CARE:    Current Treatment Recommendations      [x] Strengthening     [x] ROM   [x] Balance Training   [x] Endurance Training   [x] Transfer Training   [x] Gait Training   [x] Stair Training   [] Positioning   [] Safety and Education Training   [x] Patient/Caregiver Education   [] HEP  [] Other     PT care will be provided in accordance with the objectives noted above. Exercises and functional mobility practice will be used as well as appropriate assistive devices or modalities to obtain goals. Patient and family education will also be administered as needed. Frequency of treatments: 2-5x/week x 1-2 weeks.     Time in  08:05  Time out  08:35    Total Treatment Time  10 minutes Evaluation Time includes thorough review of current medical information, gathering information on past medical history/social history and prior level of function, completion of standardized testing/informal observation of tasks, assessment of data and education on plan of care and goals. CPT codes:  [x] Low Complexity PT evaluation 20630  [] Moderate Complexity PT evaluation 32025  [] High Complexity PT evaluation 25911  [] PT Re-evaluation 21234  [] Gait training 06972 ** minutes  [] Manual therapy 85791 ** minutes  [] Therapeutic activities 82567 ** minutes  [x] Therapeutic exercises 31740 10 minutes  [] Neuromuscular reeducation 39999 ** minutes     Juan Jose Elizabeth., P.T.   License Number: [id number]

## 2021-02-16 NOTE — PROGRESS NOTES
[facility]   Progress Note    Admitting Date and Time: 2/15/2021  3:18 AM  Admit Dx: Unable to ambulate [R26.2]    Subjective: Admitted on 15th, came with left hip pain, patient with hypertension, lupus, arthritis, brain aneurysm, prior CVA, seen by orthopedics, log roll negative, recommends PT for ROM and gait training, also NSAIDs, follow-up in office for injection as needed, seen by [facility] of 8/24. CRP 4    Patient was admitted with Unable to ambulate [R26.2]. Patient somewhat better as compared to yesterday, still has pain, improved less than yesterday, however she also received earlier pain control. Per RN: Patient with acute change as per communication with family    ROS: denies fever, chills, cp, sob, n/v, HA unless stated above.      clopidogrel  75 mg Oral Daily    cetirizine  5 mg Oral Daily    metoprolol succinate  25 mg Oral Daily    sodium chloride flush  10 mL Intravenous 2 times per day    enoxaparin  40 mg Subcutaneous Daily         sodium chloride flush, 10 mL, PRN      promethazine, 12.5 mg, Q6H PRN    Or      ondansetron, 4 mg, Q6H PRN      polyethylene glycol, 17 g, Daily PRN      acetaminophen, 650 mg, Q6H PRN    Or      acetaminophen, 650 mg, Q6H PRN      HYDROcodone-acetaminophen, 1 tablet, Q6H PRN         Objective:    BP (!) 181/104   Pulse 74   Temp 99.9 °F (37.7 °C) (Oral)   Resp 18   Ht 5' 6\" (1.676 m)   Wt 173 lb 4.8 oz (78.6 kg)   SpO2 92%   BMI 27.97 kg/m²   General Appearance: alert and oriented to person, place and time, well-developed and well-nourished, in no acute distress  Skin: warm and dry, no rash or erythema  Head: normocephalic and atraumatic  Eyes: pupils equal, round, and reactive to light, extraocular eye movements intact, conjunctivae normal  ENT: tympanic membrane, external ear and ear canal normal bilaterally, oropharynx clear and moist with normal mucous membranes Neck: neck supple and non tender without mass, no thyromegaly or thyroid nodules, no cervical lymphadenopathy   Pulmonary/Chest: clear to auscultation bilaterally- no wheezes, rales or rhonchi, normal air movement, no respiratory distress  Cardiovascular: normal rate, normal S1 and S2, no gallops, intact distal pulses and no carotid bruits  Abdomen: soft, non-tender, non-distended, normal bowel sounds, no masses or organomegaly      Recent Labs     02/15/21  0427      K 4.3      CO2 27   BUN 14   CREATININE 1.0   GLUCOSE 119*   CALCIUM 9.5       Recent Labs     02/15/21  0427   WBC 11.3   RBC 4.90   HGB 15.2   HCT 47.1   MCV 96.1   MCH 31.0   MCHC 32.3   RDW 13.2      MPV 9.7       Radiology:   CT PELVIS WO CONTRAST Additional Contrast? None   Final Result   1. No acute fracture or dislocation. 2. Mild degenerative change about both hips. Degenerative calcification of   the labrum seen bilaterally. XR HIP BILATERAL W AP PELVIS (2 VIEWS)   Final Result   There are hip labral calcifications bilaterally. No other significant   degenerative finding. Pelvic calcifications may represent calcified uterine fibroid. XR FEMUR LEFT (MIN 2 VIEWS)   Final Result   Labral calcification. XR CHEST PORTABLE   Final Result   There is no acute abnormality seen. Assessment:    Active Problems:    Unable to ambulate  Resolved Problems:    * No resolved hospital problems.  *      Plan: 1. Left hip pain, CRP elevated, also ESR, patient seen by orthopedics, recommends PT OT, patient with significant pain, acute change, have added DVT studies for the thigh, cannot find D-dimer results which are repeated, will also get bone scan, three-phase for the left thigh, looking for either traumatic or nontraumatic possible stress fracture or pathological fracture, also whole body, which may give idea if she has been falling, or has any new bone activities noted. Have also requested ID to evaluate with high CRP, chances are very high patient has bursitis in the trochanteric region limiting her ability and we are not able to use NSAIDs due to her allergies. In that case it should subside in another 2 days. 2. Hypertension, continue home medications. 3. Prior CVA, patient on Plavix, continued.         Electronically signed by Kylah Antonio MD on 2/16/2021 at 9:09 AM

## 2021-02-16 NOTE — CARE COORDINATION
Introduced my self and provided explanation of CM role to patient. Patient with some difficulty expressing self. She does express pain. Her therapy scores/participation are limited by pain. She is from home and states she was independent prior. Not demonstrating that here with PT [facility] 8 of 24. PCP aware, has ordered u/s lower extremity, bone scan and ID consult. Has concern for bursitis per notations. Spoke with son Aleyda Kim via phone. He verifies patient was independent prior to coming to hospital and has experienced an acute onset of pain and an acute onset of impaired mobility. Aleyda Kim did clarify that he checks on patient daily, but does not share residence. His brother, Nusrat Jurado does live with patient but Nusrat Jurado is also acutely ill and hospitalized. Aleyda Kim is uncertain what patient will need post discharge and is hopeful she experiences an acute resolution of symptoms. He plans on an in person visit in AM with desire to continue discharge planning conversation at that time. Will follow along with  and assist with discharge planning as necessary. Dillon Howe.  Farzad, MSN, RN  [facility] Case Management  [phone]

## 2021-02-17 ENCOUNTER — APPOINTMENT (OUTPATIENT)
Dept: ULTRASOUND IMAGING | Age: 76
End: 2021-02-17

## 2021-02-17 ENCOUNTER — APPOINTMENT (OUTPATIENT)
Dept: NUCLEAR MEDICINE | Age: 76
End: 2021-02-17

## 2021-02-17 ENCOUNTER — APPOINTMENT (OUTPATIENT)
Dept: GENERAL RADIOLOGY | Age: 76
End: 2021-02-17

## 2021-02-17 PROCEDURE — 96372 THER/PROPH/DIAG INJ SC/IM: CPT

## 2021-02-17 PROCEDURE — 6370000000 HC RX 637 (ALT 250 FOR IP): Performed by: INTERNAL MEDICINE

## 2021-02-17 PROCEDURE — G0378 HOSPITAL OBSERVATION PER HR: HCPCS

## 2021-02-17 PROCEDURE — A9503 TC99M MEDRONATE: HCPCS | Performed by: RADIOLOGY

## 2021-02-17 PROCEDURE — 2580000003 HC RX 258: Performed by: INTERNAL MEDICINE

## 2021-02-17 PROCEDURE — 3430000000 HC RX DIAGNOSTIC RADIOPHARMACEUTICAL: Performed by: RADIOLOGY

## 2021-02-17 PROCEDURE — 76999 ECHO EXAMINATION PROCEDURE: CPT

## 2021-02-17 PROCEDURE — 78315 BONE IMAGING 3 PHASE: CPT

## 2021-02-17 PROCEDURE — 99225 PR SBSQ OBSERVATION CARE/DAY 25 MINUTES: CPT | Performed by: INTERNAL MEDICINE

## 2021-02-17 PROCEDURE — 6360000002 HC RX W HCPCS: Performed by: INTERNAL MEDICINE

## 2021-02-17 PROCEDURE — 73610 X-RAY EXAM OF ANKLE: CPT

## 2021-02-17 RX ORDER — TC 99M MEDRONATE 20 MG/10ML
25 INJECTION, POWDER, LYOPHILIZED, FOR SOLUTION INTRAVENOUS
Status: COMPLETED | OUTPATIENT
Start: 2021-02-17 | End: 2021-02-17

## 2021-02-17 RX ADMIN — SODIUM CHLORIDE, PRESERVATIVE FREE 10 ML: 5 INJECTION INTRAVENOUS at 20:29

## 2021-02-17 RX ADMIN — TC 99M MEDRONATE 25 MILLICURIE: 20 INJECTION, POWDER, LYOPHILIZED, FOR SOLUTION INTRAVENOUS at 09:22

## 2021-02-17 RX ADMIN — HYDROCODONE BITARTRATE AND ACETAMINOPHEN 1 TABLET: 7.5; 325 TABLET ORAL at 20:29

## 2021-02-17 RX ADMIN — HYDROCODONE BITARTRATE AND ACETAMINOPHEN 1 TABLET: 7.5; 325 TABLET ORAL at 07:25

## 2021-02-17 RX ADMIN — METOPROLOL SUCCINATE 25 MG: 25 TABLET, FILM COATED, EXTENDED RELEASE ORAL at 08:23

## 2021-02-17 RX ADMIN — ENOXAPARIN SODIUM 40 MG: 40 INJECTION SUBCUTANEOUS at 17:23

## 2021-02-17 RX ADMIN — SODIUM CHLORIDE, PRESERVATIVE FREE 10 ML: 5 INJECTION INTRAVENOUS at 08:24

## 2021-02-17 ASSESSMENT — PAIN DESCRIPTION - DESCRIPTORS: DESCRIPTORS: CONSTANT;ACHING;DISCOMFORT

## 2021-02-17 ASSESSMENT — PAIN DESCRIPTION - PAIN TYPE: TYPE: ACUTE PAIN

## 2021-02-17 ASSESSMENT — PAIN DESCRIPTION - LOCATION: LOCATION: HIP;LEG

## 2021-02-17 ASSESSMENT — PAIN DESCRIPTION - ONSET: ONSET: ON-GOING

## 2021-02-17 ASSESSMENT — PAIN SCALES - GENERAL
PAINLEVEL_OUTOF10: 5
PAINLEVEL_OUTOF10: 0
PAINLEVEL_OUTOF10: 2

## 2021-02-17 NOTE — PROGRESS NOTES
[facility] AT [facility]   Progress Note    Admitting Date and Time: 2/15/2021  3:18 AM  Admit Dx: Unable to ambulate [R26.2]    Subjective: Admitted on 15th, came with left hip pain, patient with hypertension, lupus, arthritis, brain aneurysm, prior CVA, seen by orthopedics, log roll negative, recommends PT for ROM and gait training, also NSAIDs, follow-up in office for injection as needed, seen by [facility] of 8/24. CRP 4. Patient seen by ID, concern for septic arthritis versus muscle tear. Do plan to consult IR. D-dimer came back at 715. As per IR not enough fluid to drain. Patient was admitted with Unable to ambulate [R26.2]. Patient still with persistent pain, denies any relief,    Per RN: Patient with acute change as per communication with family    ROS: denies fever, chills, cp, sob, n/v, HA unless stated above.      clopidogrel  75 mg Oral Daily    cetirizine  5 mg Oral Daily    metoprolol succinate  25 mg Oral Daily    sodium chloride flush  10 mL Intravenous 2 times per day    enoxaparin  40 mg Subcutaneous Daily         sodium chloride flush, 10 mL, PRN      promethazine, 12.5 mg, Q6H PRN    Or      ondansetron, 4 mg, Q6H PRN      polyethylene glycol, 17 g, Daily PRN      acetaminophen, 650 mg, Q6H PRN    Or      acetaminophen, 650 mg, Q6H PRN      HYDROcodone-acetaminophen, 1 tablet, Q6H PRN         Objective:    BP (!) 146/89   Pulse 84   Temp 99.5 °F (37.5 °C) (Oral)   Resp 18   Ht 5' 6\" (1.676 m)   Wt 176 lb (79.8 kg)   SpO2 92%   BMI 28.41 kg/m²   General Appearance: alert and oriented to person, place and time, well-developed and well-nourished, in no acute distress  Skin: warm and dry, no rash or erythema  Head: normocephalic and atraumatic  Eyes: pupils equal, round, and reactive to light, extraocular eye movements intact, conjunctivae normal ENT: tympanic membrane, external ear and ear canal normal bilaterally, oropharynx clear and moist with normal mucous membranes  Neck: neck supple and non tender without mass, no thyromegaly or thyroid nodules, no cervical lymphadenopathy   Pulmonary/Chest: clear to auscultation bilaterally- no wheezes, rales or rhonchi, normal air movement, no respiratory distress  Cardiovascular: normal rate, normal S1 and S2, no gallops, intact distal pulses and no carotid bruits  Abdomen: soft, non-tender, non-distended, normal bowel sounds, no masses or organomegaly      Recent Labs     02/15/21  0427      K 4.3      CO2 27   BUN 14   CREATININE 1.0   GLUCOSE 119*   CALCIUM 9.5       Recent Labs     02/15/21  0427   WBC 11.3   RBC 4.90   HGB 15.2   HCT 47.1   MCV 96.1   MCH 31.0   MCHC 32.3   RDW 13.2      MPV 9.7       Radiology:   US DUP LOWER EXTREMITY LEFT RAQUEL   Final Result   No evidence of DVT in the left lower extremity. CT PELVIS WO CONTRAST Additional Contrast? None   Final Result   1. No acute fracture or dislocation. 2. Mild degenerative change about both hips. Degenerative calcification of   the labrum seen bilaterally. XR HIP BILATERAL W AP PELVIS (2 VIEWS)   Final Result   There are hip labral calcifications bilaterally. No other significant   degenerative finding. Pelvic calcifications may represent calcified uterine fibroid. XR FEMUR LEFT (MIN 2 VIEWS)   Final Result   Labral calcification. XR CHEST PORTABLE   Final Result   There is no acute abnormality seen. NM BONE SCAN WHOLE BODY    (Results Pending)   IR Interventional Radiology Procedure Request    (Results Pending)       Assessment:    Active Problems:    Unable to ambulate  Resolved Problems:    * No resolved hospital problems.  *      Plan: 1. Left hip pain, CRP elevated, also ESR, patient seen by orthopedics, recommends PT OT, patient with significant pain, acute change, have added DVT studies for the thigh, cannot find D-dimer results which are repeated, will also get bone scan, three-phase for the left thigh, looking for either traumatic or nontraumatic possible stress fracture or pathological fracture, also whole body, which may give idea if she has been falling, or has any new bone activities noted. Evaluated by ID, definite concern for focus of inflammation, unfortunately not enough fluid as per IR, bone scan is in progress. 2. High CRP, chances are very high patient has bursitis in the trochanteric region limiting her ability and we are not able to use NSAIDs due to her allergies. In that case it should subside in another 2 days. If bone scan negative then will let IV decide if any role for MRI. There is enough abnormality was already noted on CT. Likely will go with the plan recommended by orthopedics of PT OT and possible intra-articular steroid injection down the line  3. Hypertension, continue home medications. 4. Prior CVA, patient on Plavix, continued.         Electronically signed by Eliu Cowan MD on 2/17/2021 at 8:41 AM

## 2021-02-17 NOTE — PATIENT CARE CONFERENCE
P Quality Flow/Interdisciplinary Rounds Progress Note        Quality Flow Rounds held on February 17, 2021    Disciplines Attending:  Bedside Nurse, ,  and Nursing Unit [address] was admitted on 2/15/2021  3:18 AM    Anticipated Discharge Date:  Expected Discharge Date: 02/18/21    Disposition:    Luigi Score:  Luigi Scale Score: 14    Readmission Risk              Risk of Unplanned Readmission:        10           Discussed patient goal for the day, patient clinical progression, and barriers to discharge.   The following Goal(s) of the Day/Commitment(s) have been identified:  Labs - Report Results      Marsh Olszewski  February 17, 2021

## 2021-02-17 NOTE — PROGRESS NOTES
Patient brought to US to scan the LEFT hip joint. Dr Jimmy Waters, IR, reviewed scans. He stated there is not enough fluid around hip joint to perform the Hip aspiration at this time. Discussed results with the patient. She verbalized understanding. Transferred back to room. Report called to Floor RN. Transported back to floor without distress.

## 2021-02-17 NOTE — CARE COORDINATION
Spoke with patient son Jose M Schulz over phone. He is concerned that there is no definitive diagnosis yet as to what the cause of patient's pain/immobility might be. He indicates that if no answers found, patient may need higher level of care. Regarding the discharge planning process, he is hesitant to engage in discussion because of above, but does reveal that he would not be able to provide the necessary care for his mother. Will await bone scan result and discuss next steps with primary care. Will follow along with  and assist with discharge planning as necessary. Amanda Tiwari.  Farzad, MSN, RN  [facility] Case Management  [phone]

## 2021-02-17 NOTE — PROGRESS NOTES
[address] [place] [address] [facility]  JAIMEIDA  Progress Note    SUBJECTIVE:  Chief Complaint   Patient presents with    Hip Pain     started 3 days ago unable to ambulate     No fever, chills. Recently returned to room from IR. - unable to aspirate any fluid from hip joint   Still with left hip pain with ROM. Not as tender with palpation today. Review of systems:  As stated above in the chief complaint, otherwise negative. Medications:  Scheduled Meds:   clopidogrel  75 mg Oral Daily    cetirizine  5 mg Oral Daily    metoprolol succinate  25 mg Oral Daily    sodium chloride flush  10 mL Intravenous 2 times per day    enoxaparin  40 mg Subcutaneous Daily     Continuous Infusions:  PRN Meds:sodium chloride flush, promethazine **OR** ondansetron, polyethylene glycol, acetaminophen **OR** acetaminophen, HYDROcodone-acetaminophen    OBJECTIVE:  BP (!) 146/89   Pulse 84   Temp 98.5 °F (36.9 °C) (Oral)   Resp 16   Ht 5' 6\" (1.676 m)   Wt 176 lb (79.8 kg)   SpO2 92%   BMI 28.41 kg/m²   Temp  Av °F (37.2 °C)  Min: 98.5 °F (36.9 °C)  Max: 99.5 °F (37.5 °C)  Constitutional: The patient is awake, alert, and oriented. Some degree of aphagia. No distress  Skin: Warm and dry. No rashes were noted. HEENT: Round and reactive pupils. Moist mucous membranes. No ulcerations or thrush. Neck: Supple to movements. Chest: No use of accessory muscles to breathe. Symmetrical expansion. Clear. Cardiovascular: S1 and S2 are rhythmic and regular. No murmurs appreciated. Abdomen: Positive bowel sounds to auscultation. Benign to palpation. No masses felt. Extremities: tenderness on palpation of the left hip is less today. There is still pain with passive ROM. No erythema or warmth. No edema.    Lines: peripheral    Laboratory and Tests Review:  Lab Results   Component Value Date    WBC 11.3 02/15/2021    WBC 6.7 2020    WBC 11.0 2019    HGB 15.2 02/15/2021    HCT 47.1 02/15/2021

## 2021-02-18 ENCOUNTER — APPOINTMENT (OUTPATIENT)
Dept: CT IMAGING | Age: 76
End: 2021-02-18

## 2021-02-18 PROCEDURE — 2580000003 HC RX 258: Performed by: INTERNAL MEDICINE

## 2021-02-18 PROCEDURE — 97110 THERAPEUTIC EXERCISES: CPT

## 2021-02-18 PROCEDURE — G0378 HOSPITAL OBSERVATION PER HR: HCPCS

## 2021-02-18 PROCEDURE — 97530 THERAPEUTIC ACTIVITIES: CPT

## 2021-02-18 PROCEDURE — 6370000000 HC RX 637 (ALT 250 FOR IP): Performed by: INTERNAL MEDICINE

## 2021-02-18 PROCEDURE — 6360000002 HC RX W HCPCS: Performed by: INTERNAL MEDICINE

## 2021-02-18 PROCEDURE — 96372 THER/PROPH/DIAG INJ SC/IM: CPT

## 2021-02-18 PROCEDURE — 99225 PR SBSQ OBSERVATION CARE/DAY 25 MINUTES: CPT | Performed by: INTERNAL MEDICINE

## 2021-02-18 PROCEDURE — 99220 PR INITIAL OBSERVATION CARE/DAY 70 MINUTES: CPT | Performed by: PSYCHIATRY & NEUROLOGY

## 2021-02-18 PROCEDURE — 70450 CT HEAD/BRAIN W/O DYE: CPT

## 2021-02-18 RX ADMIN — CLOPIDOGREL BISULFATE 75 MG: 75 TABLET ORAL at 08:18

## 2021-02-18 RX ADMIN — SODIUM CHLORIDE, PRESERVATIVE FREE 10 ML: 5 INJECTION INTRAVENOUS at 08:18

## 2021-02-18 RX ADMIN — SODIUM CHLORIDE, PRESERVATIVE FREE 10 ML: 5 INJECTION INTRAVENOUS at 20:30

## 2021-02-18 RX ADMIN — ENOXAPARIN SODIUM 40 MG: 40 INJECTION SUBCUTANEOUS at 16:16

## 2021-02-18 RX ADMIN — HYDROCODONE BITARTRATE AND ACETAMINOPHEN 1 TABLET: 7.5; 325 TABLET ORAL at 08:18

## 2021-02-18 RX ADMIN — METOPROLOL SUCCINATE 25 MG: 25 TABLET, FILM COATED, EXTENDED RELEASE ORAL at 08:18

## 2021-02-18 RX ADMIN — CETIRIZINE HYDROCHLORIDE 5 MG: 10 TABLET, FILM COATED ORAL at 08:18

## 2021-02-18 RX ADMIN — HYDROCODONE BITARTRATE AND ACETAMINOPHEN 1 TABLET: 7.5; 325 TABLET ORAL at 18:03

## 2021-02-18 ASSESSMENT — PAIN DESCRIPTION - PAIN TYPE: TYPE: ACUTE PAIN

## 2021-02-18 ASSESSMENT — PAIN DESCRIPTION - LOCATION: LOCATION: HIP

## 2021-02-18 ASSESSMENT — PAIN DESCRIPTION - DESCRIPTORS: DESCRIPTORS: ACHING;CONSTANT;DISCOMFORT

## 2021-02-18 ASSESSMENT — PAIN - FUNCTIONAL ASSESSMENT: PAIN_FUNCTIONAL_ASSESSMENT: PREVENTS OR INTERFERES SOME ACTIVE ACTIVITIES AND ADLS

## 2021-02-18 NOTE — PROGRESS NOTES
Physical Therapy    Facility/Department: [facility] MED SURG  Treatment Note  NAME: Barbara Dutta  : 1945  MRN: [id number]    Date of Service: 2021      Attending Provider:  Maria G Short DO    Evaluating PT:  Yang Lima, P.T. Room #:  1130/1260-T  Diagnosis:  Unable to amb, L side and LLE pain  Pertinent PMHx/PSHx:  Brain aneurysm with surgery, lupus, aphasic, CVA per pt with residual R side weakness and spasticity. Imaging: Bone scan positive for B greater trochanteric bursitis worse on the L. Precautions:  WBAT LLE, falls, bed/chair alarm    SUBJECTIVE:    Pt lives with her 2 sons (one is in the hospital) in a 2 story home with 4 stairs and 1 rail to enter. There are 12 steps and 1 rail to 2nd floor bed and bath. Pt ambulated with no AD PTA. OBJECTIVE:   Initial Evaluation  Date: 21 Treatment Short Term/ Long Term   Goals   Was pt agreeable to Eval/treatment? yes yes    Does pt have pain? C/o pain at rest L side below ribs, and posterior L knee with knee ext. No c/o pain at rest, but has pain L posterior knee with manual L knee ext. Bed Mobility  NA, pt unable to move due to pain. Rolling: MAX A  Supine to sit: MAX A x2  Sit to supine: MAX A x2  Scooting: MAX A Independent   Transfers NA NA, pt was unable with MAX A x2 supervision   Ambulation   NA  feet with AAD if needed SBA   Stair negotiation: ascended and descended NA NA 12 steps with 1 rail SBA   AM-PAC 6 Clicks 064       BLE ROM is WFL. RLE strength is grossly 3/5 to 4-/5 and LLE is grossly 2/5 to 3-/5. Balance: Sitting EOB SBA/MIN A due to occasional LOB. Therapeutic Exercises:  Pt was instructed in/performed supine exercises with the BLE: ankle PF/DF AAROM 10 reps each, heel slides and quad sets AAROM 10 each, hip ABD/ADD AAROM 10 reps each. Patient education  Pt educated on above exs and bed mobility.     Patient response to education: Pt verbalized understanding Pt demonstrated skill Pt requires further education in this area   yes yes yes     ASSESSMENT:    Comments:  Pt was found in bed and is aphasic, but again able to answer most questions and requires increased time to find words most of the time. Pt had no c/o pain lying in bed, but when performing RLE exs she had c/o R thigh pain, may be from lying in bed and spasticity causing increase tone and then pain with movement. Pt c/o posterior L knee pain with L knee ext during ROM exs. If I did not allow L knee ext to get to end range she had no c/o pain with L knee FLEX/EXT or hip FLEX/EXT. She also had no c/o L hip pain with movement of L hip during ABD/ADD. Pt had TTP to posterior L knee and R thigh. She did not have TTP L hip, abdomen, or L oblique today. After above exs pt was able to sit on EOB and did not appear to have much pain. She sat EOB x 15 min working on sitting balance and core strength. She attempted to stand 3x and was unable with MAX A x2. Pt was able to do more today with less pain. Treatment:  Patient practiced and was instructed in the following treatment:    ? Bed mobility and sitting EOB to improve functional strength and endurance. ? Above exs to improve BLE ROM and decrease spasm, tightness, and pain. Pt was left supine in bed with call light left by patient. Chair/bed alarm: bed alarm was re-activated and student nurses with pt. PLAN:    Pt is making good progress toward established Physical Therapy goals. Continue with physical therapy current plan of care. Time in  09:30  Time out  10:00    Total Treatment Time  30 minutes     Evaluation Time includes thorough review of current medical information, gathering information on past medical history/social history and prior level of function, completion of standardized testing/informal observation of tasks, assessment of data and education on plan of care and goals.     CPT codes: [] Low Complexity PT evaluation 90805  [] Moderate Complexity PT evaluation 66762  [] High Complexity PT evaluation 32942  [] PT Re-evaluation 53799  [] Gait training 60562 ** minutes  [] Manual therapy 73254 ** minutes  [x] Therapeutic activities 94304 18 minutes  [x] Therapeutic exercises 86660 12 minutes  [] Neuromuscular reeducation 40555 ** minutes     Juan Jose Garcia, P.T.   License Number: [id number]

## 2021-02-18 NOTE — CONSULTS
NEUROLOGY CONSULT NOTE       Requesting Physician: Alesha Conde DO / Ted Ocampo CNP    Reason for Consult:  Evaluate for \"worsening aphasia; Hx of CVA\"    History of Present Illness:  Farhad Bagley is a 70 y.o. female admitted to [facility] on 2/15/2021 for left hip pain with difficulty walking. 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 She is having further studies of the hip. She needs maximal assistance with therapies. She has chronic aphasia and review of her past records show that this does fluctuate. Tends to be worse in the morning. She was seen by neurology in 2015 for an exacerbation of her aphasia. She has a history of aneurysm clipping and coiling. She had aneurysms treated by Dr. Taj Hannah and from reviewing her records it seems like this was many years ago. 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 I did know she has had strokes in addition to what areas happened with treatment of the aneurysms. I do not see any admissions in our system for that. The patient is, indeed, aphasic and she does not really add to history. According to her nurse the patient does not say much but she is able to converse at baseline. This morning she was having considerable difficulty talking but then later was speaking in short sentences slowly. She now is speaking much less again. Unfortunately the patient's yes and no answers or not reliable. I do not know if she is having a lot of pain right now or what the issue is.     Past Medical History:        Diagnosis Date    Arthritis     Brain aneurysm     Hypertension     Lupus ([facility] [address]            Procedure Laterality Date    APPENDECTOMY      BRAIN ANEURYSM SURGERY 6     CHOLECYSTECTOMY  2016    cholelithias    INCONTINENCE SURGERY         Social History:  Social History     Tobacco Use   Smoking Status Former Smoker    Packs/day: 1.00    Years: 20.00    Pack years: 20.00    Start date: 1965   Herb Flannery Quit date: 1986    Years since quittin.9   Smokeless Tobacco Never Used   Tobacco Comment    quit in [de-identified]     Social History     Substance and Sexual Activity   Alcohol Use No     Social History     Substance and Sexual Activity   Drug Use No         Family History:       Family history unknown: Yes       Review of Systems:  Patient is answering \"yeah\" to almost all the questions I asked her including clearly incorrect orientation questions so unfortunately ROS is entirely unreliable. Allergies:     Allergies   Allergen Reactions    Aspirin      Breaks out    Dye [Iodides]      Ct scan dye    Penicillins Hives        Current Medications:       clopidogrel (PLAVIX) tablet 75 mg, Daily      cetirizine (ZYRTEC) tablet 5 mg, Daily      metoprolol succinate (TOPROL XL) extended release tablet 25 mg, Daily      sodium chloride flush 0.9 % injection 10 mL, 2 times per day      sodium chloride flush 0.9 % injection 10 mL, PRN      enoxaparin (LOVENOX) injection 40 mg, Daily      promethazine (PHENERGAN) tablet 12.5 mg, Q6H PRN    Or      ondansetron (ZOFRAN) injection 4 mg, Q6H PRN      polyethylene glycol (GLYCOLAX) packet 17 g, Daily PRN      acetaminophen (TYLENOL) tablet 650 mg, Q6H PRN    Or      acetaminophen (TYLENOL) suppository 650 mg, Q6H PRN      HYDROcodone-acetaminophen (NORCO) 7.5-325 MG per tablet 1 tablet, Q6H PRN       Medications Prior to Admission: Influenza Vac High-Dose Quad (FLUZONE) 0.7 ML SURY injection, Inject 0.7 mLs into the muscle once *GIVEN AT DR. Benna Brunner OFFICE*  clopidogrel (PLAVIX) 75 MG tablet, Take 1 tablet by mouth daily metoprolol succinate (TOPROL XL) 25 MG extended release tablet, Take 1 tablet by mouth daily  loratadine (CLARITIN) 10 MG tablet, Take 1 tablet by mouth daily    Physical Exam:  /78 Comment: manual  Pulse 70   Temp 99.2 °F (37.3 °C) (Oral)   Resp 22   Ht 5' 6\" (1.676 m)   Wt 174 lb (78.9 kg)   SpO2 93%   BMI 28.08 kg/m²  I Body mass index is 28.08 kg/m². I   Wt Readings from Last 1 Encounters:   02/18/21 174 lb (78.9 kg)        GENERAL: Uncomfortableappearing 70year-old lying quietly in the bed. EYE:  Fundi not examined due to the Covid-19 outbreak  CARDIOVASCULAR:  Heart regular rate and rhythm. No carotid bruits detected. NEUROLOGIC:  Level of Alertness/attention: Looks alert and attention/concentration seem to be normal  MSE: Could not be tested due to aphasia  Language: The patient was able to follow simple commands though she has some difficulty with even very slightly complex commands. She answered yes/no questions but usually said \"yeah\" to what ever was asked. No significant spontaneous speech. Cranial Nerves: pupils are equal; right eyes deviated far rightward; left eye moves in all directions.;  Her face looks symmetric but she would not smile for me or show her teeth. Did not follow other commands for cranial nerve testing. Motor Exam: Normal tone in all extremities. I did not test her proximally lower extremities due to her hip pain. She gave normal resistance for ankle dorsiflexion bilaterally. In the upper extremities she had trouble following some commands but she did seem to be a little weak in the right upper extremity, mostly in the MRC grade 4 range, but possibly weaker for finger movements as poor is MRC grade 3 -.   Sensory: Responses not reliable  Coordination: Had trouble following commands for testing but able to tap her finger to her thumb slowly bilaterally, perhaps a tiny bit worse on the right than the left Deep Tendon Reflexes: More brisk left upper extremity than right  Abnormal movements: none  Station and gait: Did not get her up as she is maximal assistance with therapies and and pain    Diagnostics:  CBC:   Lab Results   Component Value Date    WBC 11.3 02/15/2021    RBC 4.90 02/15/2021    HGB 15.2 02/15/2021    HCT 47.1 02/15/2021    MCV 96.1 02/15/2021    MCH 31.0 02/15/2021    MCHC 32.3 02/15/2021    RDW 13.2 02/15/2021     02/15/2021    MPV 9.7 02/15/2021     CMP:    Lab Results   Component Value Date     02/15/2021    K 4.3 02/15/2021     02/15/2021    CO2 27 02/15/2021    BUN 14 02/15/2021    CREATININE 1.0 02/15/2021    GFRAA >60 02/15/2021    LABGLOM >60 02/15/2021    GLUCOSE 119 02/15/2021    GLUCOSE 117 02/28/2012    PROT 6.9 11/16/2019    LABALBU 2.8 11/16/2019    CALCIUM 9.5 02/15/2021    BILITOT 0.4 11/16/2019    ALKPHOS 151 11/16/2019    AST 30 11/16/2019    ALT 32 11/16/2019     PT/INR:    Lab Results   Component Value Date    PROTIME 13.5 02/16/2021    PROTIME 11.9 02/28/2012    INR 1.2 02/16/2021     LDL cholesterol in November was 85 with triglycerides 147      Impression:  TIA/CVA; vs fluctuation in her usual aphasia due to pain, fatigue, etc; vs seizures/post-ictal exacerbation of aphasia      Recommendations :  CT has been ordered by the primary service. Will also get a carotid ultrasound. If the symptoms persist, we may need to consider an MRI scan, unless family can tell us that she typically has significant fluctuations in her baseline aphasia. If family feels this degree of fluctuation is new, we could also get an EEG. She is already on Plavix, and I am not anxious to add aspirin or a statin without a clear history of a new or past ischemic stroke, and with her history of ruptured aneurysms. I appreciate the opportunity to participate in this interesting patient's care.     Electronically signed by Dat Alicea DO on 2/18/2021 at 2:47 PM Addendum 2/18 1510 - EEG is coming here for another patient, and we will soon be coming into the weekend, so I'm ordering that now

## 2021-02-18 NOTE — PROGRESS NOTES
[facility] Progress Note    Admitting Date and Time: 2/15/2021  3:18 AM  Admit Dx: Unable to ambulate [R26.2]    Subjective:  Patient is being followed for Unable to ambulate [R26.2]     Pt awake, alert, sitting up in bed in no acute distress  Difficulty in exam as pt having aphasia  Pt becoming frustrated at attempting to find words    Per nursing- pt did have what appeared to be worsening aphasia this morning. She was then communicating without difficulty. It then appeared to worsen again      ROS: denies fever, chills, cp, sob, n/v, HA unless stated above.  clopidogrel  75 mg Oral Daily    cetirizine  5 mg Oral Daily    metoprolol succinate  25 mg Oral Daily    sodium chloride flush  10 mL Intravenous 2 times per day    enoxaparin  40 mg Subcutaneous Daily         sodium chloride flush, 10 mL, PRN      promethazine, 12.5 mg, Q6H PRN    Or      ondansetron, 4 mg, Q6H PRN      polyethylene glycol, 17 g, Daily PRN      acetaminophen, 650 mg, Q6H PRN    Or      acetaminophen, 650 mg, Q6H PRN      HYDROcodone-acetaminophen, 1 tablet, Q6H PRN         Objective:    /77   Pulse 80   Temp 99.2 °F (37.3 °C) (Oral)   Resp 22   Ht 5' 6\" (1.676 m)   Wt 174 lb (78.9 kg)   SpO2 93%   BMI 28.08 kg/m²   General Appearance: able to state name. Aphasia noted. Able to follow commands with prompting and repeating request at times 2-3x.  Noted to have right sided weakness   Skin: warm and dry  Head: normocephalic and atraumatic  Eyes: pupils equal, round, and reactive to light, extraocular eye movements intact, conjunctivae normal  Neck: neck supple and non tender without mass   Pulmonary/Chest: clear to auscultation bilaterally-  Cardiovascular: normal rate, normal S1 and S2 and no carotid bruits  Abdomen: soft, non-tender, non-distended, normal bowel sounds  Extremities: no cyanosis, no clubbing and no edema  Neurologic: aphasia No results for input(s): NA, K, CL, CO2, BUN, CREATININE, GLUCOSE, CALCIUM in the last 72 hours. No results for input(s): WBC, RBC, HGB, HCT, MCV, MCH, MCHC, RDW, PLT, MPV in the last 72 hours. Assessment:    Active Problems:    Unable to ambulate  Resolved Problems:    * No resolved hospital problems. *      Plan:  1. Left hip pain septic arthritis vs muscle tear: pt presented to the ER due to inability to ambulate and left hip pain. 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 ID and Orthopedic surgery consulted- appreciate input. Not enough fluid to aspirate in IR. NM bone scan showing bilateral trochanteric bursitis worse on left. Per ortho WBAT and NSAIDS prn. However- pt has an allergy to NSAIDS. Follow up in office for injection prn pain. PT/OT consulted- noted to have am pac score 8    2. Concern for worsening aphasia: pt does have a h/o CVA and brain aneurysm repair. During exam- pt noted to have significant aphasia. Appeared to be coming quite frustrated findings words. Per nursing- appears to be waxing/ waning. She did have difficult this am and then improved. However again worsened. Nursing reporting pt able to talk much easier yesterday. Noted to have right sided weakness. I did try to call patient's son Jahaira Key via phone to discuss and he did not answer. I was then informed by nursing that son Jahaira Key is a patient on [address]. I was able to locate him. He did report that pt has aphasia and / right sided weakness at baseline. It can become worse if pt is frustrated or upset. Jahaira Key reporting he is having his own health issues that he believes is worrying mom/ the patient. Pt does have extensive h/o aneurysms. Discussed with attending. Will still check CT head. Pt unable to have MRI per son as he has clips in brain. Also will consult neurology. 3. Deconditioning: PT/OT. Son is not sure that family would be interested in rehab. Going to think about.  Am pac score 8 4. H/o CVA/ multiple brain aneurysms: on plavix    5. HTN: continue metoprolol    6. H/o SLE    NOTE: This report was transcribed using voice recognition software. Every effort was made to ensure accuracy; however, inadvertent computerized transcription errors may be present.   Electronically signed by SVITLANA Montalvo on 2/18/2021 at 12:22 PM

## 2021-02-18 NOTE — PROGRESS NOTES
[address] [place] [address] [facility]  Progress Note  Face to face encounter   SUBJECTIVE:  Chief Complaint   Patient presents with    Hip Pain     started 3 days ago unable to ambulate     No fever, chills. Tmax- 99.5   Still with hip pain- but patient reports it is better today   In bed- no acute distress. On room air. Review of systems:  As stated above in the chief complaint, otherwise negative. Medications:  Scheduled Meds:   clopidogrel  75 mg Oral Daily    cetirizine  5 mg Oral Daily    metoprolol succinate  25 mg Oral Daily    sodium chloride flush  10 mL Intravenous 2 times per day    enoxaparin  40 mg Subcutaneous Daily     Continuous Infusions:  PRN Meds:sodium chloride flush, promethazine **OR** ondansetron, polyethylene glycol, acetaminophen **OR** acetaminophen, HYDROcodone-acetaminophen    OBJECTIVE:  /70   Pulse 88   Temp 99 °F (37.2 °C) (Oral)   Resp 24   Ht 5' 6\" (1.676 m)   Wt 174 lb (78.9 kg)   SpO2 91%   BMI 28.08 kg/m²   Temp  Av.3 °F (37.4 °C)  Min: 99 °F (37.2 °C)  Max: 99.5 °F (37.5 °C)  Constitutional: The patient is awake, alert, no distress. Some degree of aphagia. No distress  Skin: Warm and dry. No rashes were noted. HEENT: Round and reactive pupils. Moist mucous membranes. No ulcerations or thrush. Neck: Supple to movements. Chest: No use of accessory muscles to breathe. Symmetrical expansion. Clear. Cardiovascular: S1 and S2 are rhythmic and regular. No murmurs appreciated. Abdomen: Positive bowel sounds to auscultation. Benign to palpation. No masses felt. Extremities: tenderness on palpation of the left hip is less today. There is still pain with passive ROM. No erythema or warmth. No edema.    Lines: peripheral    Laboratory and Tests Review:  Lab Results   Component Value Date    WBC 11.3 02/15/2021    WBC 6.7 2020    WBC 11.0 2019    HGB 15.2 02/15/2021    HCT 47.1 02/15/2021    MCV 96.1 02/15/2021  02/15/2021     Lab Results   Component Value Date    NEUTROABS 8.15 (H) 02/15/2021    NEUTROABS 3.08 11/23/2020    NEUTROABS 7.04 11/16/2019     No results found for: CRPHS  Lab Results   Component Value Date    ALT 32 11/16/2019    AST 30 11/16/2019    ALKPHOS 151 (H) 11/16/2019    BILITOT 0.4 11/16/2019     Lab Results   Component Value Date     02/15/2021    K 4.3 02/15/2021     02/15/2021    CO2 27 02/15/2021    BUN 14 02/15/2021    CREATININE 1.0 02/15/2021    CREATININE 0.8 11/16/2019    CREATININE 0.8 11/15/2019    GFRAA >60 02/15/2021    LABGLOM >60 02/15/2021    GLUCOSE 119 02/15/2021    GLUCOSE 117 02/28/2012    PROT 6.9 11/16/2019    LABALBU 2.8 11/16/2019    CALCIUM 9.5 02/15/2021    BILITOT 0.4 11/16/2019    ALKPHOS 151 11/16/2019    AST 30 11/16/2019    ALT 32 11/16/2019     Lab Results   Component Value Date    CRP 4.0 (H) 02/15/2021    CRP 1.7 (H) 06/18/2015     Lab Results   Component Value Date    SEDRATE 80 (H) 02/15/2021     Radiology:  Reviewed   Bone scan  Impression:        1.  Bilateral trochanteric bursitis, worse on the left. 2.  No findings of femoral or pelvic fracture. 3.  Multifocal degenerative/arthritic foci, as above. 4.  Nonspecific intense uptake, anterior right ankle.  Suggest further   correlation with right ankle x-ray exam.     Microbiology:   No microbiology to review. ASSESSMENT:  · Left hip pain - septic arthritis vs muscle tear - not enough fluid to aspirate in IR. · bilateral trochanteric bursitis worse on left     PLAN:  · Continue off antibiotics at this time   · IR unable to aspirate fluid, no effusion noted on ultrasound.    · NM bone scan- reviewed- bilateral trochanteric bursitis   · Monitor labs- sed rate - 80  CRP- 4.0    Chuck Ceballos  10:46 AM  2/18/2021 Patient seen and examined. I had a face to face encounter with the patient. Agree with exam.  Assessment and plan as outlined above and directed by me. Addition and corrections were done as deemed appropriate. My exam and plan include: The patient's left hip is feeling somewhat better. Radiology did not find enough fluid to aspirate. She says that she has had inflammatory arthritis related to her SLE but these were confined to the SI joint. I told her that it is not clear how she got bursitis of the left hip but it seems to be doing better. She can tolerate a logroll somewhat better.   Consider nonsteroidals for steroids to see if this continues to improve    Spencer Guzman  2/18/2021  1:36 PM

## 2021-02-18 NOTE — PROGRESS NOTES
[facility] Quality Flow/Interdisciplinary Rounds Progress Note        Quality Flow Rounds held on February 18, 2021    Disciplines Attending:  Bedside Nurse, ,  and Nursing Unit [address] was admitted on 2/15/2021  3:18 AM    Anticipated Discharge Date:  Expected Discharge Date: 02/18/21    Disposition:    Luigi Score:  Luigi Scale Score: 14    Readmission Risk              Risk of Unplanned Readmission:        10           Discussed patient goal for the day, patient clinical progression, and barriers to discharge.   The following Goal(s) of the Day/Commitment(s) have been identified:  Diagnostics - Report Results and Labs - Report Results, discharge planning      Mary Mobley  February 18, 2021

## 2021-02-18 NOTE — PLAN OF CARE
Problem: Skin Integrity:  Goal: Will show no infection signs and symptoms  Description: Will show no infection signs and symptoms  Outcome: Met This Shift     Problem: Discharge Planning:  Goal: Participates in care planning  Description: Participates in care planning  Outcome: Met This Shift

## 2021-02-18 NOTE — PLAN OF CARE
Problem: Falls - Risk of:  Goal: Will remain free from falls  Description: Will remain free from falls  Outcome: Met This Shift  Goal: Absence of physical injury  Description: Absence of physical injury  Outcome: Met This Shift     Problem: Pain:  Description: Pain management should include both nonpharmacologic and pharmacologic interventions.   Goal: Pain level will decrease  Description: Pain level will decrease  Outcome: Met This Shift  Goal: Control of acute pain  Description: Control of acute pain  Outcome: Met This Shift  Goal: Control of chronic pain  Description: Control of chronic pain  Outcome: Met This Shift     Problem: Skin Integrity:  Goal: Will show no infection signs and symptoms  Description: Will show no infection signs and symptoms  2/18/2021 1118 by Tammy Antonio RN  Outcome: Met This Shift  2/18/2021 0608 by Radha Torres RN  Outcome: Met This Shift  Goal: Absence of new skin breakdown  Description: Absence of new skin breakdown  Outcome: Met This Shift

## 2021-02-18 NOTE — CARE COORDINATION
Spoke with patient's son Tin Gurrola via phone. Made him aware of new orders today including neuro consult, ct head. He voices understanding that discharge planning needs to commence and is willing to review a skilled nursing facility list.  He requested list be sent to him via email, request obliged. He will text back with 3 facilities of choice. Will refer to facilities of choice upon son's response. Davin Panda, MSN, RN  [facility] Case Management  [phone]

## 2021-02-19 ENCOUNTER — APPOINTMENT (OUTPATIENT)
Dept: ULTRASOUND IMAGING | Age: 76
End: 2021-02-19

## 2021-02-19 ENCOUNTER — APPOINTMENT (OUTPATIENT)
Dept: NEUROLOGY | Age: 76
End: 2021-02-19

## 2021-02-19 DIAGNOSIS — J30.81 ALLERGIC RHINITIS DUE TO ANIMAL HAIR AND DANDER: ICD-10-CM

## 2021-02-19 LAB
ANION GAP SERPL CALCULATED.3IONS-SCNC: 11 MMOL/L (ref 7–16)
BUN BLDV-MCNC: 30 MG/DL (ref 8–23)
CALCIUM SERPL-MCNC: 9.3 MG/DL (ref 8.6–10.2)
CHLORIDE BLD-SCNC: 102 MMOL/L (ref 98–107)
CO2: 24 MMOL/L (ref 22–29)
CREAT SERPL-MCNC: 0.9 MG/DL (ref 0.5–1)
GFR AFRICAN AMERICAN: >60
GFR NON-AFRICAN AMERICAN: >60 ML/MIN/1.73
GLUCOSE BLD-MCNC: 109 MG/DL (ref 74–99)
HCT VFR BLD CALC: 44 % (ref 34–48)
HEMOGLOBIN: 13.5 G/DL (ref 11.5–15.5)
MCH RBC QN AUTO: 30.1 PG (ref 26–35)
MCHC RBC AUTO-ENTMCNC: 30.7 % (ref 32–34.5)
MCV RBC AUTO: 98.2 FL (ref 80–99.9)
PDW BLD-RTO: 13.2 FL (ref 11.5–15)
PLATELET # BLD: 265 E9/L (ref 130–450)
PMV BLD AUTO: 9.7 FL (ref 7–12)
POTASSIUM SERPL-SCNC: 3.8 MMOL/L (ref 3.5–5)
RBC # BLD: 4.48 E12/L (ref 3.5–5.5)
SODIUM BLD-SCNC: 137 MMOL/L (ref 132–146)
WBC # BLD: 11.4 E9/L (ref 4.5–11.5)

## 2021-02-19 PROCEDURE — 2580000003 HC RX 258: Performed by: INTERNAL MEDICINE

## 2021-02-19 PROCEDURE — 85027 COMPLETE CBC AUTOMATED: CPT

## 2021-02-19 PROCEDURE — 99224 PR SBSQ OBSERVATION CARE/DAY 15 MINUTES: CPT | Performed by: PSYCHIATRY & NEUROLOGY

## 2021-02-19 PROCEDURE — 96372 THER/PROPH/DIAG INJ SC/IM: CPT

## 2021-02-19 PROCEDURE — 97530 THERAPEUTIC ACTIVITIES: CPT

## 2021-02-19 PROCEDURE — 95819 EEG AWAKE AND ASLEEP: CPT

## 2021-02-19 PROCEDURE — 97535 SELF CARE MNGMENT TRAINING: CPT

## 2021-02-19 PROCEDURE — 80048 BASIC METABOLIC PNL TOTAL CA: CPT

## 2021-02-19 PROCEDURE — G0378 HOSPITAL OBSERVATION PER HR: HCPCS

## 2021-02-19 PROCEDURE — 93880 EXTRACRANIAL BILAT STUDY: CPT

## 2021-02-19 PROCEDURE — 99225 PR SBSQ OBSERVATION CARE/DAY 25 MINUTES: CPT | Performed by: INTERNAL MEDICINE

## 2021-02-19 PROCEDURE — 6370000000 HC RX 637 (ALT 250 FOR IP): Performed by: INTERNAL MEDICINE

## 2021-02-19 PROCEDURE — 36415 COLL VENOUS BLD VENIPUNCTURE: CPT

## 2021-02-19 PROCEDURE — 6360000002 HC RX W HCPCS: Performed by: INTERNAL MEDICINE

## 2021-02-19 PROCEDURE — 95816 EEG AWAKE AND DROWSY: CPT | Performed by: PSYCHIATRY & NEUROLOGY

## 2021-02-19 RX ORDER — LORATADINE 10 MG/1
10 TABLET ORAL DAILY
Qty: 90 TABLET | Refills: 1 | Status: SHIPPED
Start: 2021-02-19 | End: 2021-12-16 | Stop reason: ALTCHOICE

## 2021-02-19 RX ADMIN — HYDROCODONE BITARTRATE AND ACETAMINOPHEN 1 TABLET: 7.5; 325 TABLET ORAL at 06:54

## 2021-02-19 RX ADMIN — CLOPIDOGREL BISULFATE 75 MG: 75 TABLET ORAL at 09:05

## 2021-02-19 RX ADMIN — CETIRIZINE HYDROCHLORIDE 5 MG: 10 TABLET, FILM COATED ORAL at 09:05

## 2021-02-19 RX ADMIN — ENOXAPARIN SODIUM 40 MG: 40 INJECTION SUBCUTANEOUS at 15:29

## 2021-02-19 RX ADMIN — SODIUM CHLORIDE, PRESERVATIVE FREE 10 ML: 5 INJECTION INTRAVENOUS at 09:05

## 2021-02-19 RX ADMIN — METOPROLOL SUCCINATE 25 MG: 25 TABLET, FILM COATED, EXTENDED RELEASE ORAL at 09:05

## 2021-02-19 RX ADMIN — SODIUM CHLORIDE, PRESERVATIVE FREE 10 ML: 5 INJECTION INTRAVENOUS at 20:16

## 2021-02-19 ASSESSMENT — PAIN SCALES - GENERAL: PAINLEVEL_OUTOF10: 0

## 2021-02-19 NOTE — PROGRESS NOTES
[facility] Progress Note    Admitting Date and Time: 2/15/2021  3:18 AM  Admit Dx: Unable to ambulate [R26.2]    Subjective:  Patient is being followed for Unable to ambulate [R26.2]     Pt sitting up in bed watching TV in no acute distress  Still having aphasia  Tearful              ROS: denies fever, chills, cp, sob, n/v, HA unless stated above.  clopidogrel  75 mg Oral Daily    cetirizine  5 mg Oral Daily    metoprolol succinate  25 mg Oral Daily    sodium chloride flush  10 mL Intravenous 2 times per day    enoxaparin  40 mg Subcutaneous Daily         sodium chloride flush, 10 mL, PRN      promethazine, 12.5 mg, Q6H PRN    Or      ondansetron, 4 mg, Q6H PRN      polyethylene glycol, 17 g, Daily PRN      acetaminophen, 650 mg, Q6H PRN    Or      acetaminophen, 650 mg, Q6H PRN      HYDROcodone-acetaminophen, 1 tablet, Q6H PRN         Objective:    /70   Pulse 83   Temp 98 °F (36.7 °C) (Oral)   Resp 16   Ht 5' 6\" (1.676 m)   Wt 171 lb 9 oz (77.8 kg)   SpO2 92%   BMI 27.69 kg/m²   General Appearance: able to state name. Aphasia noted. Able to follow commands with prompting and repeating request at times 2-3x.  Noted to have right sided weakness   Skin: warm and dry  Head: normocephalic and atraumatic  Eyes: pupils equal, round, and reactive to light, extraocular eye movements intact, conjunctivae normal  Neck: neck supple and non tender without mass   Pulmonary/Chest: clear to auscultation bilaterally-  Cardiovascular: normal rate, normal S1 and S2 and no carotid bruits  Abdomen: soft, non-tender, non-distended, normal bowel sounds  Extremities: no cyanosis, no clubbing and no edema  Neurologic: aphasia        Recent Labs     02/19/21  0320      K 3.8      CO2 24   BUN 30*   CREATININE 0.9   GLUCOSE 109*   CALCIUM 9.3       Recent Labs     02/19/21  0320   WBC 11.4   RBC 4.48   HGB 13.5   HCT 44.0   MCV 98.2   MCH 30.1   MCHC 30.7*   RDW 13.2    MPV 9.7         Assessment:    Active Problems:    Unable to ambulate    Left hip pain  Resolved Problems:    * No resolved hospital problems. *      Plan:  1. Left hip pain septic arthritis vs muscle tear: pt presented to the ER due to inability to ambulate and left hip pain. 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 ID and Orthopedic surgery consulted- appreciate input. Not enough fluid to aspirate in IR. NM bone scan showing bilateral trochanteric bursitis worse on left. Per ortho WBAT and NSAIDS prn. However- pt has an allergy to NSAIDS. Follow up in office for injection prn pain. PT/OT consulted- noted to have am pac score 8     2. Concern for worsening aphasia: pt does have a h/o CVA and brain aneurysm repair. During exam 2/18- pt noted to have significant aphasia. Appeared to be coming quite frustrated findings words. Per nursing- appeared to be waxing/ waning. She did have difficult in the am and then improved. However again worsened. Nursing reporting pt able to talk much easier 2/17. Noted to have right sided weakness. I did try to call patient's son Rashad Ferreira via phone to discuss and he did not answer. I was then informed by nursing that son Rashad Ferreira is a patient on [address]. I was able to locate him. He did report that pt has aphasia and / right sided weakness at baseline. It can become worse if pt is frustrated or upset. Rashad Ferreira reporting he is having his own health issues that he believes is worrying mom/ the patient. Pt does have extensive h/o aneurysms. Discussed with attending. CT head checked with no acute findings. EEG pending. US carotid ordered. Neuro consulted- appreciate input.      3. Deconditioning: PT/OT. Social work looking into [facility] placement.     4. H/o CVA/ multiple brain aneurysms: on plavix     5.  HTN: continue metoprolol     6. H/o SLE        Dispo: social work looking into [facility] [address] NOTE: This report was transcribed using voice recognition software. Every effort was made to ensure accuracy; however, inadvertent computerized transcription errors may be present.   Electronically signed by SVITLANA Luis on 2/19/2021 at 3:03 PM

## 2021-02-19 NOTE — PATIENT CARE CONFERENCE
P Quality Flow/Interdisciplinary Rounds Progress Note        Quality Flow Rounds held on February 19, 2021    Disciplines Attending:  Bedside Nurse, ,  and Nursing Unit [address] was admitted on 2/15/2021  3:18 AM    Anticipated Discharge Date:  Expected Discharge Date: 02/18/21    Disposition:    Luigi Score:  Luigi Scale Score: 13    Readmission Risk              Risk of Unplanned Readmission:        12           Discussed patient goal for the day, patient clinical progression, and barriers to discharge.   The following Goal(s) of the Day/Commitment(s) have been identified:  Diagnostics - Report Results- EEG      Jesse Benson  February 19, 2021

## 2021-02-19 NOTE — PROGRESS NOTES
02/19/2021     Lab Results   Component Value Date    NEUTROABS 8.15 (H) 02/15/2021    NEUTROABS 3.08 11/23/2020    NEUTROABS 7.04 11/16/2019     No results found for: CRPHS  Lab Results   Component Value Date    ALT 32 11/16/2019    AST 30 11/16/2019    ALKPHOS 151 (H) 11/16/2019    BILITOT 0.4 11/16/2019     Lab Results   Component Value Date     02/19/2021    K 3.8 02/19/2021    K 4.3 02/15/2021     02/19/2021    CO2 24 02/19/2021    BUN 30 02/19/2021    CREATININE 0.9 02/19/2021    CREATININE 1.0 02/15/2021    CREATININE 0.8 11/16/2019    GFRAA >60 02/19/2021    LABGLOM >60 02/19/2021    GLUCOSE 109 02/19/2021    GLUCOSE 117 02/28/2012    PROT 6.9 11/16/2019    LABALBU 2.8 11/16/2019    CALCIUM 9.3 02/19/2021    BILITOT 0.4 11/16/2019    ALKPHOS 151 11/16/2019    AST 30 11/16/2019    ALT 32 11/16/2019     Lab Results   Component Value Date    CRP 4.0 (H) 02/15/2021    CRP 1.7 (H) 06/18/2015     Lab Results   Component Value Date    SEDRATE 80 (H) 02/15/2021     Radiology:  Reviewed   Bone scan  Impression:        1.  Bilateral trochanteric bursitis, worse on the left. 2.  No findings of femoral or pelvic fracture. 3.  Multifocal degenerative/arthritic foci, as above. 4.  Nonspecific intense uptake, anterior right ankle.  Suggest further   correlation with right ankle x-ray exam.       Microbiology:   No microbiology to review. ASSESSMENT:  · Left hip pain - septic arthritis vs muscle tear - not enough fluid to aspirate in IR. · Bilateral trochanteric bursitis worse on left     PLAN:  · Continue off antibiotics at this time   · IR unable to aspirate fluid, no effusion noted on ultrasound.    · NM bone scan- reviewed- bilateral trochanteric bursitis     Leonie Glenview  10:41 AM  2/19/2021 Patient seen and examined. I had a face to face encounter with the patient. Agree with exam.  Assessment and plan as outlined above and directed by me. Addition and corrections were done as deemed appropriate. My exam and plan include: The patient seems to be improving. She still has expressive aphasia but otherwise looks well. Logroll of the left leg there is no longer as tender. The fact that she has bilateral involvement makes it less likely to be an infectious process. It is improving without antibiotics. We have no further recommendations and will be signing off.     Beatriz Rueda  2/19/2021  11:53 AM

## 2021-02-19 NOTE — PROGRESS NOTES
11/23/20 174 lb 6.4 oz (79.1 kg)   11/16/20 174 lb (78.9 kg)         CBC:   Recent Labs     02/19/21 0320   WBC 11.4   HGB 13.5        BMP:    Recent Labs     02/19/21 0320      K 3.8      CO2 24   BUN 30*   CREATININE 0.9   GLUCOSE 109*     Calcium:  Recent Labs     02/19/21 0320   CALCIUM 9.3       Radiology     Brain images reviewedencephalomalacia on the left. Carotid ultrasound - no significant stenosis  EEG shows slight slowing on the left hemisphere with possible irritative features but no definite evidence of seizures. Assessment/Plan:    Active Problems:    Unable to ambulate    Left hip pain  Resolved Problems:    * No resolved hospital problems. *    Fluctuating aphasia. It's quite common to see this with aphasia, with worsening when the patient is upset or ill. Often the harder the patient's trying to say something, the more difficult it is. This seems to have happened frequently enough here in the hospital that I am not highly suspicious that this is something like a TIA or seizure. While the EEG did suggest a possible seizure tendency, the finding was soft enough that I am not terribly inclined to start her on an anticonvulsant just for this. If we get any history of unresponsive episodes we could always reconsider that.         Electronically signed by Sukhi Mahmood DO on 2/19/2021 at 5:13 PM    Neurology

## 2021-02-19 NOTE — PLAN OF CARE
Problem: Falls - Risk of:  Goal: Will remain free from falls  Description: Will remain free from falls  2/19/2021 1241 by Roverto Douglas RN  Outcome: Met This Shift     Problem: Falls - Risk of:  Goal: Absence of physical injury  Description: Absence of physical injury  Outcome: Met This Shift     Problem: Pain:  Goal: Pain level will decrease  Description: Pain level will decrease  2/19/2021 1241 by Roverto Douglas RN  Outcome: Met This Shift     Problem: Pain:  Goal: Control of acute pain  Description: Control of acute pain  Outcome: Met This Shift     Problem: Pain:  Goal: Control of chronic pain  Description: Control of chronic pain  Outcome: Met This Shift     Problem: Skin Integrity:  Goal: Will show no infection signs and symptoms  Description: Will show no infection signs and symptoms  Outcome: Met This Shift  Goal: Absence of new skin breakdown  Description: Absence of new skin breakdown  Outcome: Met This Shift     Problem: Confusion - Acute:  Goal: Absence of continued neurological deterioration signs and symptoms  Description: Absence of continued neurological deterioration signs and symptoms  Outcome: Met This Shift  Goal: Mental status will be restored to baseline  Description: Mental status will be restored to baseline  Outcome: Met This Shift     Problem: Discharge Planning:  Goal: Ability to perform activities of daily living will improve  Description: Ability to perform activities of daily living will improve  Outcome: Not Met This Shift  Goal: Participates in care planning  Description: Participates in care planning  Outcome: Not Met This Shift     Problem: Injury - Risk of, Physical Injury:  Goal: Will remain free from falls  Description: Will remain free from falls  2/19/2021 1241 by Roverto Douglas RN  Outcome: Met This Shift     Problem: Injury - Risk of, Physical Injury:  Goal: Absence of physical injury  Description: Absence of physical injury  Outcome: Met This Shift Problem: Mood - Altered:  Goal: Mood stable  Description: Mood stable  Outcome: Met This Shift  Goal: Absence of abusive behavior  Description: Absence of abusive behavior  Outcome: Met This Shift  Goal: Verbalizations of feeling emotionally comfortable while being cared for will increase  Description: Verbalizations of feeling emotionally comfortable while being cared for will increase  Outcome: Met This Shift     Problem: Psychomotor Activity - Altered:  Goal: Absence of psychomotor disturbance signs and symptoms  Description: Absence of psychomotor disturbance signs and symptoms  Outcome: Met This Shift     Problem: Sensory Perception - Impaired:  Goal: Demonstrations of improved sensory functioning will increase  Description: Demonstrations of improved sensory functioning will increase  Outcome: Met This Shift  Goal: Decrease in sensory misperception frequency  Description: Decrease in sensory misperception frequency  Outcome: Met This Shift  Goal: Able to refrain from responding to false sensory perceptions  Description: Able to refrain from responding to false sensory perceptions  Outcome: Met This Shift  Goal: Demonstrates accurate environmental perceptions  Description: Demonstrates accurate environmental perceptions  Outcome: Met This Shift  Goal: Able to distinguish between reality-based and nonreality-based thinking  Description: Able to distinguish between reality-based and nonreality-based thinking  Outcome: Met This Shift  Goal: Able to interrupt nonreality-based thinking  Description: Able to interrupt nonreality-based thinking  Outcome: Met This Shift     Problem: Sleep Pattern Disturbance:  Goal: Appears well-rested  Description: Appears well-rested  Outcome: Met This Shift     Problem: Mobility - Impaired  Goal: Mobility level is maintained or improved  Outcome: Met This Shift

## 2021-02-19 NOTE — PROCEDURES
ELECTROENCEPHALOGRAPHY REPORT     PATIENT NAME: Barbara Dutta   DATE:2/19/2021    MEDICAL RECORD NO: [id number]   ROOM NO: 0535/3500-C   ACCOUNT NO: [de-identified]   REFERRING DOCTOR: Maria Eugenia Watson DO     CLINICAL HISTORY:  This is a 70 y.o. female who has fluctuations in aphasia. The possibility that this might be related to seizures is being considered. PMH:   has a past medical history of Arthritis, Brain aneurysm, Hypertension, and Lupus ([address] MEDICATIONS:     clopidogrel (PLAVIX) tablet 75 mg, Daily      cetirizine (ZYRTEC) tablet 5 mg, Daily      metoprolol succinate (TOPROL XL) extended release tablet 25 mg, Daily      sodium chloride flush 0.9 % injection 10 mL, 2 times per day      sodium chloride flush 0.9 % injection 10 mL, PRN      enoxaparin (LOVENOX) injection 40 mg, Daily      promethazine (PHENERGAN) tablet 12.5 mg, Q6H PRN    Or      ondansetron (ZOFRAN) injection 4 mg, Q6H PRN      polyethylene glycol (GLYCOLAX) packet 17 g, Daily PRN      acetaminophen (TYLENOL) tablet 650 mg, Q6H PRN    Or      acetaminophen (TYLENOL) suppository 650 mg, Q6H PRN      HYDROcodone-acetaminophen (NORCO) 7.5-325 MG per tablet 1 tablet, Q6H PRN           DESCRIPTION OF RECORD:    Waking background consisted of activity predominantly in the theta range, which was often slightly faster in the right hemisphere. Activity was generally at 6 to 7 Hz on the left and 7 Hz theta to 8 Hz alpha on the right. Additionally, activity on the left was sometimes sharply contoured. Background actively was posteriorly predominant and attenuated with eye opening. The technician noted the patient to sleep and vertex waves were seen. There may been some poorly formed sleep spindles but stage II sleep was not clearly entered. No definite epileptiform activity was seen. The EKG lead showed mild tachycardia. IMPRESSION: This is an abnormal EEG. Background activity was borderline slow with slightly greater slowing being seen in the left hemisphere. This is suggestive of a structural disturbance in that hemisphere, which would be consistent with the patient's known history. In addition, background activity was infrequently sharply contoured on the left, suggesting a possible irritative focus in that hemisphere. While this sharply contoured theta activity can be associated with seizures, definite epileptiform activity was not seen, and clinical correlation is necessary.       Tahira Silver DO  2/19/2021   5:05 PM

## 2021-02-19 NOTE — PLAN OF CARE
Problem: Falls - Risk of:  Goal: Will remain free from falls  Description: Will remain free from falls  Outcome: Met This Shift     Problem: Injury - Risk of, Physical Injury:  Goal: Will remain free from falls  Description: Will remain free from falls  Outcome: Met This Shift

## 2021-02-19 NOTE — CARE COORDINATION
Spoke with patient's two sons, Ethel Guardado via phone, and Lisa Erazo in person. Both were provided SNF lists/options. The choice was made for a referral to [facility]. A referral was called to [facility] with same who reviewed chart and responded with acceptance inclusive of the weekend. No COVID testing is required per Mandi Bryant. Patient has a pending carotid u/s. Await neuro perspective as well. VANDANA son, will complete DIANA Simental.  Farzad, MSN, RN  [facility] Case Management  [phone]

## 2021-02-19 NOTE — PROGRESS NOTES
Occupational Therapy  OT BEDSIDE TREATMENT NOTE      Date:2021  Patient Name: Gianni Ferguson  MRN: [id number]  : 1945  Room: [address] [place] [address]-A     Referring Provider: Rosa Maria Todd MD     Evaluating OT: Rivas García OTR/L [id number]     AM-PAC Daily Activity Raw Score:      Recommended Adaptive Equipment:  TBD      Diagnosis: Unable to ambulate    Pertinent Medical History: brain aneurysm, lupus   Precautions:  Falls, alarm, WBAT L LE     Per chart:  Home Living: Pt lives with sons (one currently in hospital). 2 story with 4 steps/rail to enter   Bed/bath on 2nd floor         Pain Level: Pt complaint of LE pain with movement. Cognition: awake and alert. Answering questions however aphasic and has difficulty to word find at times. Cues for safety. Functional Assessment:    Initial Eval Status  Date: 21 Treatment Status   STGs = LTGs  Time frame: 7-10 days   Feeding SBA/set-up       Grooming Mod A,seated Min  A while seated on the side of the bed to comb hair. SBA wash face.   SBA   UB Dressing Mod A  Min  A   SBA    LB Dressing Dependent  Max A   Mod A    Bathing Max A    Mod a    Toileting Dependent  Max A       Bed Mobility  Max A  Supine to partial sit EOB   Patient resistive to sitting up  Max A to return to bed and reposition   Patient would move R LE with minimal assist, total assist with moving L LE  Inconsistent at times   Mod A supine to sit onto the side of the bed. Max A sit to supine.       Functional Transfers Returned to bed   C/o increase pain with minimal L LE movement  Sit to stand max A with pt fearful and only able to complete partial stand.    Mod a    Functional Mobility NT    Mod A with fair + tolerance    Balance Sitting:     Static:  Max A- attempting to sit up in bed   Standing: NT  Sit balance on the EOB SBA.   Pt weight shifting throughout session to increase comfort.   CGA   with good tolerance    Activity Tolerance Poor+ with light activity Pain limiting in tolerance   fair  Good with ADL activity        Comments:  Pt pleasant and cooperative. Follows directions however does require cues at times. Initially tearful however less so when actively participating in activity. Transport arrived to take pt to a test.     Education/treatment:  ADL retraining with facilitation of movement to increase self care skills. Therapeutic activity to address balance, strength, and endurance for ADL and transfers. Pt education of daily orientation and transfer safety. · Pt has made  progress towards set goals.       Plan of Care: 1-3 days/week for 1-2 weeks PRN   [x]? ?ADL retraining/adapted techniques and AE recommendations to increase functional independence within precautions                    [x]? ? Energy conservation techniques to improve tolerance for selfcare routine   [x]? ? Functional transfer/mobility training/DME recommendations for increased independence, safety and fall prevention         [x]? ?Patient/family education to increase safety and functional independence             [x]? ? Environmental modifications for safe mobility and completion of ADLs                             []? ? Cognitive retraining ex's to improve problem solving skills & safe participation in ADLs/IADLs     []? ?Sensory re-education techniques to improve extremity awareness, maintain skin integrity and improve hand function                             []? ? Visual/Perceptual retraining  to improve body awareness and safety during transfers and ADLs  []? ? Splinting/positioning needs to maintain joint/skin integrity and prevent contractures  [x]? ? Therapeutic activity to improve functional performance during ADLs.                                         [x]? ? Therapeutic exercise to improve tolerance and functional strength for ADLs   [x]? ? Balance retraining/tolerance tasks for facilitation of postural control with dynamic challenges during ADLs . []??Neuromuscular re-education: facilitation of righting/equilibrium reactions, midline orientation, scapular stability/mobility, Normalization muscle     tone and facilitation active functional movement/Attention                         []? ? Delirium prevention/treatment    [x]? Positioning to improve functional independence  []? ? Other:     Time In: 1:15  Time Out: 1:50      Min Units   Therapeutic Ex 53776     Therapeutic Activities 41147 15 1   ADL/Self Care 31929 20 1   Orthotic Management 61186     Neuro Re-Ed 97646     Non-Billable Time     TOTAL TIMED TREATMENT [facility] KATE/L 42962

## 2021-02-19 NOTE — CARE COORDINATION
Social Work discharge planning   Pas[place] done, as pt is observation. Ambulance forms in folder for transport to [facility] at discharge.   Electronically signed by Pamela Randolph on 2/19/2021 at 3:56 PM

## 2021-02-20 VITALS
BODY MASS INDEX: 27.64 KG/M2 | TEMPERATURE: 98 F | HEART RATE: 70 BPM | OXYGEN SATURATION: 93 % | RESPIRATION RATE: 18 BRPM | DIASTOLIC BLOOD PRESSURE: 97 MMHG | WEIGHT: 172 LBS | HEIGHT: 66 IN | SYSTOLIC BLOOD PRESSURE: 168 MMHG

## 2021-02-20 PROCEDURE — G0378 HOSPITAL OBSERVATION PER HR: HCPCS

## 2021-02-20 PROCEDURE — 96375 TX/PRO/DX INJ NEW DRUG ADDON: CPT

## 2021-02-20 PROCEDURE — APPSS45 APP SPLIT SHARED TIME 31-45 MINUTES: Performed by: NURSE PRACTITIONER

## 2021-02-20 PROCEDURE — 6360000002 HC RX W HCPCS: Performed by: INTERNAL MEDICINE

## 2021-02-20 PROCEDURE — 96372 THER/PROPH/DIAG INJ SC/IM: CPT

## 2021-02-20 PROCEDURE — 6370000000 HC RX 637 (ALT 250 FOR IP): Performed by: INTERNAL MEDICINE

## 2021-02-20 PROCEDURE — 99217 PR OBSERVATION CARE DISCHARGE MANAGEMENT: CPT | Performed by: INTERNAL MEDICINE

## 2021-02-20 PROCEDURE — 2580000003 HC RX 258: Performed by: INTERNAL MEDICINE

## 2021-02-20 RX ORDER — HYDROCODONE BITARTRATE AND ACETAMINOPHEN 7.5; 325 MG/1; MG/1
1 TABLET ORAL EVERY 6 HOURS PRN
Qty: 12 TABLET | Refills: 0 | Status: SHIPPED | OUTPATIENT
Start: 2021-02-20 | End: 2021-02-23

## 2021-02-20 RX ADMIN — HYDROCODONE BITARTRATE AND ACETAMINOPHEN 1 TABLET: 7.5; 325 TABLET ORAL at 08:20

## 2021-02-20 RX ADMIN — CETIRIZINE HYDROCHLORIDE 5 MG: 10 TABLET, FILM COATED ORAL at 08:21

## 2021-02-20 RX ADMIN — SODIUM CHLORIDE, PRESERVATIVE FREE 10 ML: 5 INJECTION INTRAVENOUS at 08:21

## 2021-02-20 RX ADMIN — ONDANSETRON 4 MG: 2 INJECTION INTRAMUSCULAR; INTRAVENOUS at 08:21

## 2021-02-20 RX ADMIN — METOPROLOL SUCCINATE 25 MG: 25 TABLET, FILM COATED, EXTENDED RELEASE ORAL at 08:21

## 2021-02-20 RX ADMIN — CLOPIDOGREL BISULFATE 75 MG: 75 TABLET ORAL at 08:21

## 2021-02-20 RX ADMIN — ENOXAPARIN SODIUM 40 MG: 40 INJECTION SUBCUTANEOUS at 13:42

## 2021-02-20 RX ADMIN — HYDROCODONE BITARTRATE AND ACETAMINOPHEN 1 TABLET: 7.5; 325 TABLET ORAL at 00:45

## 2021-02-20 ASSESSMENT — PAIN SCALES - GENERAL
PAINLEVEL_OUTOF10: 6
PAINLEVEL_OUTOF10: 6

## 2021-02-20 ASSESSMENT — PAIN DESCRIPTION - ONSET
ONSET: ON-GOING
ONSET: AWAKENED FROM SLEEP

## 2021-02-20 ASSESSMENT — PAIN DESCRIPTION - FREQUENCY: FREQUENCY: CONTINUOUS

## 2021-02-20 ASSESSMENT — PAIN DESCRIPTION - PAIN TYPE: TYPE: ACUTE PAIN

## 2021-02-20 ASSESSMENT — PAIN DESCRIPTION - LOCATION
LOCATION: HEAD;HIP
LOCATION: GENERALIZED

## 2021-02-20 ASSESSMENT — PAIN DESCRIPTION - DESCRIPTORS: DESCRIPTORS: ACHING;DISCOMFORT

## 2021-02-20 ASSESSMENT — PAIN - FUNCTIONAL ASSESSMENT: PAIN_FUNCTIONAL_ASSESSMENT: PREVENTS OR INTERFERES SOME ACTIVE ACTIVITIES AND ADLS

## 2021-02-20 NOTE — PROGRESS NOTES
Nurse to nurse given to [address] [place] [address] at [facility] at this time. Johnnie Enriquez notified of discharge at this time.     Electronically signed by Janell Roque RN on 2/20/2021 at 3:58 PM

## 2021-02-20 NOTE — PROGRESS NOTES
[facility] arranged for discharge to [facility].   Electronically signed by Shy Santiago RN on 2/20/2021 at 3:13 PM

## 2021-02-20 NOTE — DISCHARGE SUMMARY
[facility] Physician Discharge Summary       CM [facility]  [address] [facility] Jorge Perez MD  [address] [place] [phone]    Schedule an appointment as soon as possible for a visit in 2 weeks  Orthopedic Surgery     Rusty Bynum [phone]    Schedule an appointment as soon as possible for a visit in 1 week  PCP    Breonna Tineo MD  [address] [place] [address]  [facility] [address] [phone]      As needed- Infectious Disease       Activity level: As tolerated   Dispo: [facility]    Condition on discharge: Stable     Patient ID:  Tammi Ayala  [id number]  20 y.o.  1945    Admit date: 2/15/2021    Discharge date and time:  2/20/2021  1:53 PM    Admission Diagnoses: Active Problems:    Unable to ambulate    Left hip pain  Resolved Problems:    * No resolved hospital problems. *      Discharge Diagnoses: Active Problems:    Unable to ambulate    Left hip pain  Resolved Problems:    * No resolved hospital problems. *      Consults:  IP CONSULT TO ORTHOPEDIC SURGERY  IP CONSULT TO INFECTIOUS DISEASES  IP CONSULT TO IV TEAM  IP CONSULT TO NEUROLOGY  IP CONSULT TO IV TEAM    Procedures: none    Hospital Course:   Patient Tammi Ayala is a 70 y.o. presented with Unable to ambulate [R26.2]   Patient presented to the ER with complaints of left hip pain. During hospitalization she was followed by ID, orthopedic surgery, and neurology.  She was treated for; 1.  Left hip pain septic arthritis vs muscle tear: pt presented to the ER due to inability to ambulate and left hip pain. 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 ID and Orthopedic surgery consulted- appreciate input. Not enough fluid to aspirate in IR. NM bone scan showing bilateral trochanteric bursitis worse on left. Per ortho WBAT and NSAIDS prn. However- pt has an allergy to NSAIDS. Follow up in office for injection prn pain. PT/OT consulted- noted to have am pac score 8. Prn norco. Pain improving.      2. Concern for fluctuating aphasia: pt does have a h/o CVA and brain aneurysm repair. During exam 2/18- pt noted to have significant aphasia. Appeared to be coming quite frustrated findings words. Per nursing- appeared to be waxing/ waning. She did have difficult in the am 2/18 and then improved. However again worsened. Nursing reporting pt able to talk much easier 2/17.  Noted to have right sided weakness. I did try to call patient's son Kaylyn Diaz via phone to discuss and he did not answer. I was then informed by nursing that son Kaylyn Diaz is a patient on 11 S. I was able to locate him. He did report that pt has aphasia and / right sided weakness at baseline. It can become worse if pt is frustrated or upset. Kaylyn Diaz reporting he is having his own health issues that he believes is worrying mom/ the patient. Pt does have extensive h/o aneurysms. Discussed with attending. CT head checked with no acute findings. EEG suggested possible seizure tendency- however per neurology findings soft enough that recommended that not inclined to start anticonvulsant at this time. . US carotid ordered and ok. Neuro consulted- appreciate input.      3. Deconditioning: PT/OT. social work set up [employer]     4. H/o CVA/ multiple brain aneurysms: on plavix     5.  HTN: continue metoprolol     6. H/o SLE    Pt continued to improve. She was then discharged in stable condition with the following medications, instructions, and follow up. Discharge Exam:  General Appearance: able to state name. Aphasia noted. Able to follow commands with prompting and repeating request at times 2-3x. Noted to have right sided weakness- at baseline  Skin: warm and dry  Head: normocephalic and atraumatic  Eyes: pupils equal, round, and reactive to light, extraocular eye movements intact, conjunctivae normal  Neck: neck supple and non tender without mass   Pulmonary/Chest: clear to auscultation bilaterally-  Cardiovascular: normal rate, normal S1 and S2 and no carotid bruits  Abdomen: soft, non-tender, non-distended, normal bowel sounds  Extremities: no cyanosis, no clubbing and no edema  Neurologic: aphasia    I/O last 3 completed shifts: In: 0   Out: 1050 [Urine:1050]  No intake/output data recorded. LABS:  Recent Labs     02/19/21  0320      K 3.8      CO2 24   BUN 30*   CREATININE 0.9   GLUCOSE 109*   CALCIUM 9.3       Recent Labs     02/19/21  0320   WBC 11.4   RBC 4.48   HGB 13.5   HCT 44.0   MCV 98.2   MCH 30.1   MCHC 30.7*   RDW 13.2      MPV 9.7       No results for input(s): POCGLU in the last 72 hours. Imaging:  Xr Hip Bilateral W Ap Pelvis (2 Views)    Result Date: 2/15/2021  EXAMINATION: ONE XRAY VIEW OF THE PELVIS AND TWO XRAY VIEWS OF EACH OF THE BILATERAL HIPS 2/15/2021 4:57 am COMPARISON: None. HISTORY: ORDERING SYSTEM PROVIDED HISTORY: left hip pain TECHNOLOGIST PROVIDED HISTORY: Reason for exam:->left hip pain FINDINGS: Pelvis is intact. No fracture identified. There calcified labrum involving both hips. There is no significant joint narrowing. There is no hip fracture or dislocation. Pelvic calcifications may represent calcified uterine fibroid. There are hip labral calcifications bilaterally. No other significant degenerative finding. Pelvic calcifications may represent calcified uterine fibroid. Xr Femur Left (min 2 Views)    Result Date: 2/15/2021  EXAMINATION: AP and lateral XRAY VIEWS OF THE LEFT FEMUR 2/15/2021 4:57 am COMPARISON: None. HISTORY: ORDERING SYSTEM PROVIDED HISTORY: left hip pain TECHNOLOGIST PROVIDED HISTORY: Reason for exam:->left hip pain FINDINGS: There is calcification in the labrum at the hip. There is no significant joint narrowing. There is no hip fracture or dislocation. The distal femur is intact and unremarkable. Labral calcification.     Ct Pelvis Wo Contrast Additional Contrast? None    Result Date: 2/15/2021 EXAMINATION: CT OF THE PELVIS WITHOUT CONTRAST 2/15/2021 8:28 am TECHNIQUE: CT of the pelvis was performed without the administration of intravenous contrast.  Multiplanar reformatted images are provided for review. Dose modulation, iterative reconstruction, and/or weight based adjustment of the mA/kV was utilized to reduce the radiation dose to as low as reasonably achievable. COMPARISON: Plain films of the pelvis and bilateral hips dated 02/15/2021, about 3 hours prior, CT abdomen and pelvis dated 06/16/2019 HISTORY: ORDERING SYSTEM PROVIDED HISTORY: left hip pain TECHNOLOGIST PROVIDED HISTORY: Reason for exam:->left hip pain Additional Contrast?->None Decision Support Exception->Emergency Medical Condition (MA) FINDINGS: There is no evidence of acute fracture or dislocation. No osseous destructive lesion is seen. The hip joint spaces are well maintained with mild spurring. There is soft tissue calcification involving the labrum bilaterally, likely degenerative. This is similar compared to the prior CT study. No periarticular erosions seen. There is degenerative change in the lower lumbar spine. There is a retroverted uterus with multiple calcified fibroids in the fundus. Otherwise, no pelvic mass, lymphadenopathy or free fluid is seen. There are sigmoid diverticula, without evidence of diverticulitis. 1. No acute fracture or dislocation. 2. Mild degenerative change about both hips. Degenerative calcification of the labrum seen bilaterally. Xr Chest Portable    Result Date: 2/15/2021  EXAMINATION: ONE XRAY VIEW OF THE CHEST 2/15/2021 4:57 am COMPARISON: 01/24/2017 HISTORY: ORDERING SYSTEM PROVIDED HISTORY: chest pain TECHNOLOGIST PROVIDED HISTORY: Reason for exam:->chest pain FINDINGS: Heart size is normal. Pulmonary vasculature is not congested. Mediastinal and hilar contours are acceptable. The lungs are clear. The pleural spaces are clear. There is no pneumothorax. There is no acute abnormality seen. Us Dup Lower Extremity Left Bk    Result Date: 2/16/2021  EXAMINATION: DUPLEX VENOUS ULTRASOUND OF THE LEFT LOWER EXTREMITY 2/16/2021 4:15 pm TECHNIQUE: Duplex ultrasound and Doppler images were obtained of the left lower extremity. COMPARISON: None. HISTORY: ORDERING SYSTEM PROVIDED HISTORY: Please evaluate left thigh /patient with left upper thigh pain more on the lateral region TECHNOLOGIST PROVIDED HISTORY: Reason for exam:->Please evaluate left thigh /patient with left upper thigh pain more on the lateral region What reading provider will be dictating this exam?->CRC FINDINGS: The visualized veins of the left lower extremity are patent and free of echogenic thrombus. The veins are normally compressible and have normal phasic flow. No evidence of DVT in the left lower extremity. Patient Instructions:      Medication List      START taking these medications    HYDROcodone-acetaminophen 7.5-325 MG per tablet  Commonly known as: NORCO  Take 1 tablet by mouth every 6 hours as needed for Pain for up to 3 days.         CONTINUE taking these medications    clopidogrel 75 MG tablet  Commonly known as: PLAVIX  Take 1 tablet by mouth daily     Influenza Vac High-Dose Quad 0.7 ML Neha injection  Commonly known as: FLUZONE     loratadine 10 MG tablet  Commonly known as: Claritin  Take 1 tablet by mouth daily     metoprolol succinate 25 MG extended release tablet  Commonly known as: TOPROL XL  Take 1 tablet by mouth daily           Where to Get Your Medications      These medications were sent to [facility] [address]  [address] [address]    Phone: [phone]   · loratadine 10 MG tablet     You can get these medications from any pharmacy    Bring a paper prescription for each of these medications  · HYDROcodone-acetaminophen 7.5-325 MG per tablet Note that more than 30 minutes was spent in preparing discharge papers, discussing discharge with patient, medication review, etc.    Signed:  Electronically signed by SVITLANA Be on 2/20/2021 at 1:53 PM

## 2021-02-20 NOTE — DISCHARGE INSTR - COC
Continuity of Care Form    Patient Name: Panchito Carrion   :    MRN:  [id number]    [facility] date:  2/15/2021  Discharge date:  21    Code Status Order: Full Code   Advance Directives:   Advance Care Flowsheet Documentation     Date/Time Healthcare Directive Type of Healthcare Directive Copy in [address] Agent's Name Healthcare Agent's Phone Number    02/15/21 1119  No, patient does not have an advance directive for healthcare treatment -- -- -- -- --          Admitting Physician:  Osavldo Farah MD  PCP: Sam Hinds DO    Discharging Nurse: [place] Unit/Room#: 3725/8518-V  Discharging Unit Phone Number: [phone]    Emergency Contact:   Extended Emergency Contact Information  Primary Emergency Contact: [facility]  Address: [address] [place] [address] Phone: [phone]  Relation: Child  Secondary Emergency Contact: Melania [facility] Phone: [phone]  Relation: Child    Past Surgical History:  Past Surgical History:   Procedure Laterality Date    APPENDECTOMY      BRAIN ANEURYSM SURGERY      6     CHOLECYSTECTOMY  2016    cholelithias    INCONTINENCE SURGERY         Immunization History:   Immunization History   Administered Date(s) Administered    Influenza Vaccine, unspecified formulation 10/15/2013    Influenza Virus Vaccine 02/15/2016    Influenza, High Dose (Fluzone 65 yrs and older) 2017    Influenza, Quadv, adjuvanted, 65 yrs +, IM, PF (Fluad) 2020    Influenza, Triv, inactivated, subunit, adjuvanted, IM (Fluad 65 yrs and older) 2019    Pneumococcal Polysaccharide ([id number]) 2017       Active Problems:  Patient Active Problem List   Diagnosis Code    Hypokalemia E87.6    Hypertension I10    Lupus ([facility] [address] M32.9    Acute cystitis with hematuria N30.01    Lumbar radiculopathy M54.16  History of cerebral aneurysm repair Z98.890, Z86.79    Mild dementia ([facility] [address] F03.90    Anomalies of cerebrovascular system, 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    Acute metabolic encephalopathy D07.17    Expressive aphasia R47.01    Sepsis due to urinary tract infection (HCC) A41.9, N39.0    Acute cystitis without hematuria N30.00    Unable to ambulate R26.2    Left hip pain M25.552       Isolation/Infection:   Isolation          No Isolation        Patient Infection Status     None to display          Nurse Assessment:  Last Vital Signs: BP (!) 168/97   Pulse 70   Temp 98 °F (36.7 °C) (Oral)   Resp 18   Ht 5' 6\" (1.676 m)   Wt 172 lb (78 kg)   SpO2 93%   BMI 27.76 kg/m²     Last documented pain score (0-10 scale): Pain Level: 3  Last Weight:   Wt Readings from Last 1 Encounters:   02/20/21 172 lb (78 kg)     Mental Status:  oriented and alert    IV Access:  - None    Nursing Mobility/ADLs:  Walking   Dependent  Transfer  Dependent  Bathing  Dependent  Dressing  Dependent  Toileting  Dependent  Feeding  Independent  Med Admin  Dependent  Med Delivery   none    Wound Care Documentation and Therapy:  Wound 06/20/15   (Active)   Number of days: 2072       Incision 04/08/16 Abdomen Anterior;Mid;Right;Upper;Proximal (Active)   Number of days: 1779        Elimination:  Continence:   · Bowel: No  · Bladder: No  Urinary Catheter: None   Colostomy/Ileostomy/Ileal Conduit: No       Date of Last BM: 2/19/21   ***  Intake/Output Summary (Last 24 hours) at 2/20/2021 1518  Last data filed at 2/20/2021 1142  Gross per 24 hour   Intake 240 ml   Output 900 ml   Net -660 ml     I/O last 3 completed shifts: In: 240 [P.O.:240]  Out: 900 [Urine:900]    Safety Concerns: At Risk for Falls    Impairments/Disabilities:      None    Nutrition Therapy:  Current Nutrition Therapy:   - Oral Diet:  General    Routes of Feeding: Oral  Liquids:  Thin Liquids  Daily Fluid Restriction: no Last Modified Barium Swallow with Video (Video Swallowing Test): not done    Treatments at the Time of Hospital Discharge:   Respiratory Treatments: ***  Oxygen Therapy:  is not on home oxygen therapy. Ventilator:    - No ventilator support    Rehab Therapies: Physical Therapy and Occupational Therapy  Weight Bearing Status/Restrictions: Partial weight bearing (30-50%) only on leg {RIGHT/LEFT:16} leg  Other Medical Equipment (for information only, NOT a DME order):  {EQUIPMENT:612778480}  Other Treatments: ***    Patient's personal belongings (please select all that are sent with patient):  Kellie    RN SIGNATURE:  Electronically signed by Aundrea Lopez RN on 2/20/21 at 3:21 PM EST    CASE MANAGEMENT/SOCIAL WORK SECTION    Inpatient Status Date: ***    Readmission Risk Assessment Score:  Readmission Risk              Risk of Unplanned Readmission:        12           Discharging to Facility/ Agency   · Name:   · Address:  · Phone:  · Fax:    Dialysis Facility (if applicable)   · Name:  · Address:  · Dialysis Schedule:  · Phone:  · Fax:    / signature: {Esignature:[id number]}    PHYSICIAN SECTION    Prognosis: {Prognosis:3801776908}    Condition at Discharge: [facility] Patient Condition:[id number]}    Rehab Potential (if transferring to Rehab): {Prognosis:2036274328}    Recommended Labs or Other Treatments After Discharge: ***    Physician Certification: I certify the above information and transfer of Michelle Dunn  is necessary for the continuing treatment of the diagnosis listed and that she requires {Admit to Appropriate Level of Care:82998} for {GREATER/LESS:070878204} 30 days.      Update Admission H&P: [facility]}    PHYSICIAN SIGNATURE:  {Esignature:[id number]}

## 2021-05-05 ENCOUNTER — TELEPHONE (OUTPATIENT)
Dept: FAMILY MEDICINE CLINIC | Age: 76
End: 2021-05-05

## 2021-05-05 NOTE — TELEPHONE ENCOUNTER
Patient needs appointment for referral to [facility]. Please schedule patient.

## 2021-10-26 ENCOUNTER — TELEPHONE (OUTPATIENT)
Dept: FAMILY MEDICINE CLINIC | Age: 76
End: 2021-10-26

## 2021-10-26 NOTE — TELEPHONE ENCOUNTER
----- Message from [facility] sent at 10/26/2021  9:53 AM EDT -----  Subject: Appointment Request    Reason for Call: Routine Medicare AWV    QUESTIONS  Type of Appointment? Established Patient  Reason for appointment request? No appointments available during search  Additional Information for Provider? Pt needs to be seen for AWV. No   available appt.   ---------------------------------------------------------------------------  --------------  CALL BACK INFO  What is the best way for the office to contact you? OK to leave message on   voicemail  Preferred Call Back Phone Number? [phone]  ---------------------------------------------------------------------------  --------------  SCRIPT ANSWERS  Relationship to Patient? Other  Representative Name? [email or web address]  Additional information verified (besides Name and Date of Birth)? Address  Have your symptoms changed? No  Have you been diagnosed with, awaiting test results for, or told that you   are suspected of having COVID-19 (Coronavirus)? (If patient has tested   negative or was tested as a requirement for work, school, or travel and   not based on symptoms, answer no)? No  Within the past two weeks have you developed any of the following symptoms   (answer no if symptoms have been present longer than 2 weeks or began   more than 2 weeks ago)? Fever or Chills, Cough, Shortness of breath or   difficulty breathing, Loss of taste or smell, Sore throat, Nasal   congestion, Sneezing or runny nose, Fatigue or generalized body aches   (answer no if pain is specific to a body part e.g. back pain), Diarrhea,   Headache? No  Have you had close contact with someone with COVID-19 in the last 14 days? No  (Service Expert  click yes below to proceed with [facility] As Usual   Scheduling)?  Yes

## 2021-11-30 DIAGNOSIS — I25.10 CORONARY ARTERY DISEASE INVOLVING NATIVE HEART WITHOUT ANGINA PECTORIS, UNSPECIFIED VESSEL OR LESION TYPE: ICD-10-CM

## 2021-11-30 DIAGNOSIS — I10 ESSENTIAL HYPERTENSION: ICD-10-CM

## 2021-11-30 RX ORDER — METOPROLOL SUCCINATE 25 MG/1
25 TABLET, EXTENDED RELEASE ORAL DAILY
Qty: 30 TABLET | Refills: 0 | Status: SHIPPED
Start: 2021-11-30 | End: 2022-01-31 | Stop reason: SDUPTHER

## 2021-11-30 RX ORDER — CLOPIDOGREL BISULFATE 75 MG/1
75 TABLET ORAL DAILY
Qty: 30 TABLET | Refills: 0 | Status: SHIPPED
Start: 2021-11-30 | End: 2022-01-31 | Stop reason: SDUPTHER

## 2021-12-16 ENCOUNTER — OFFICE VISIT (OUTPATIENT)
Dept: FAMILY MEDICINE CLINIC | Age: 76
End: 2021-12-16

## 2021-12-16 VITALS
RESPIRATION RATE: 18 BRPM | BODY MASS INDEX: 25.71 KG/M2 | WEIGHT: 160 LBS | HEIGHT: 66 IN | DIASTOLIC BLOOD PRESSURE: 87 MMHG | HEART RATE: 79 BPM | OXYGEN SATURATION: 96 % | TEMPERATURE: 97.1 F | SYSTOLIC BLOOD PRESSURE: 124 MMHG

## 2021-12-16 DIAGNOSIS — I25.10 CORONARY ARTERY DISEASE INVOLVING NATIVE HEART WITHOUT ANGINA PECTORIS, UNSPECIFIED VESSEL OR LESION TYPE: ICD-10-CM

## 2021-12-16 DIAGNOSIS — H61.23 BILATERAL IMPACTED CERUMEN: ICD-10-CM

## 2021-12-16 DIAGNOSIS — I10 ESSENTIAL HYPERTENSION: Primary | ICD-10-CM

## 2021-12-16 DIAGNOSIS — Z12.11 COLON CANCER SCREENING: ICD-10-CM

## 2021-12-16 DIAGNOSIS — Z23 FLU VACCINE NEED: ICD-10-CM

## 2021-12-16 DIAGNOSIS — Z91.81 AT HIGH RISK FOR FALLS: ICD-10-CM

## 2021-12-16 PROCEDURE — G8484 FLU IMMUNIZE NO ADMIN: HCPCS | Performed by: FAMILY MEDICINE

## 2021-12-16 PROCEDURE — 90694 VACC AIIV4 NO PRSRV 0.5ML IM: CPT | Performed by: FAMILY MEDICINE

## 2021-12-16 PROCEDURE — G8399 PT W/DXA RESULTS DOCUMENT: HCPCS | Performed by: FAMILY MEDICINE

## 2021-12-16 PROCEDURE — 99214 OFFICE O/P EST MOD 30 MIN: CPT | Performed by: FAMILY MEDICINE

## 2021-12-16 PROCEDURE — G8427 DOCREV CUR MEDS BY ELIG CLIN: HCPCS | Performed by: FAMILY MEDICINE

## 2021-12-16 PROCEDURE — 1123F ACP DISCUSS/DSCN MKR DOCD: CPT | Performed by: FAMILY MEDICINE

## 2021-12-16 PROCEDURE — G8417 CALC BMI ABV UP PARAM F/U: HCPCS | Performed by: FAMILY MEDICINE

## 2021-12-16 PROCEDURE — 1036F TOBACCO NON-USER: CPT | Performed by: FAMILY MEDICINE

## 2021-12-16 PROCEDURE — 1090F PRES/ABSN URINE INCON ASSESS: CPT | Performed by: FAMILY MEDICINE

## 2021-12-16 PROCEDURE — 90471 IMMUNIZATION ADMIN: CPT | Performed by: FAMILY MEDICINE

## 2021-12-16 PROCEDURE — 3017F COLORECTAL CA SCREEN DOC REV: CPT | Performed by: FAMILY MEDICINE

## 2021-12-16 PROCEDURE — 4040F PNEUMOC VAC/ADMIN/RCVD: CPT | Performed by: FAMILY MEDICINE

## 2021-12-16 SDOH — ECONOMIC STABILITY: FOOD INSECURITY: WITHIN THE PAST 12 MONTHS, YOU WORRIED THAT YOUR FOOD WOULD RUN OUT BEFORE YOU GOT MONEY TO BUY MORE.: NEVER TRUE

## 2021-12-16 SDOH — ECONOMIC STABILITY: FOOD INSECURITY: WITHIN THE PAST 12 MONTHS, THE FOOD YOU BOUGHT JUST DIDN'T LAST AND YOU DIDN'T HAVE MONEY TO GET MORE.: NEVER TRUE

## 2021-12-16 ASSESSMENT — PATIENT HEALTH QUESTIONNAIRE - PHQ9
2. FEELING DOWN, DEPRESSED OR HOPELESS: 0
SUM OF ALL RESPONSES TO PHQ9 QUESTIONS 1 & 2: 0
SUM OF ALL RESPONSES TO PHQ QUESTIONS 1-9: 0
1. LITTLE INTEREST OR PLEASURE IN DOING THINGS: 0
SUM OF ALL RESPONSES TO PHQ QUESTIONS 1-9: 0
SUM OF ALL RESPONSES TO PHQ QUESTIONS 1-9: 0

## 2021-12-16 ASSESSMENT — SOCIAL DETERMINANTS OF HEALTH (SDOH): HOW HARD IS IT FOR YOU TO PAY FOR THE VERY BASICS LIKE FOOD, HOUSING, MEDICAL CARE, AND HEATING?: NOT HARD AT ALL

## 2021-12-16 NOTE — PATIENT INSTRUCTIONS
Patient Education        DASH Diet: Care Instructions  Your Care Instructions     The DASH diet is an eating plan that can help lower your blood pressure. DASH stands for Dietary Approaches to Stop Hypertension. Hypertension is high blood pressure. The DASH diet focuses on eating foods that are high in calcium, potassium, and magnesium. These nutrients can lower blood pressure. The foods that are highest in these nutrients are fruits, vegetables, low-fat dairy products, nuts, seeds, and legumes. But taking calcium, potassium, and magnesium supplements instead of eating foods that are high in those nutrients does not have the same effect. The DASH diet also includes whole grains, fish, and poultry. The DASH diet is one of several lifestyle changes your doctor may recommend to lower your high blood pressure. Your doctor may also want you to decrease the amount of sodium in your diet. Lowering sodium while following the DASH diet can lower blood pressure even further than just the DASH diet alone. Follow-up care is a key part of your treatment and safety. Be sure to make and go to all appointments, and call your doctor if you are having problems. It's also a good idea to know your test results and keep a list of the medicines you take. How can you care for yourself at home? Following the DASH diet  · Eat 4 to 5 servings of fruit each day. A serving is 1 medium-sized piece of fruit, ½ cup chopped or canned fruit, 1/4 cup dried fruit, or 4 ounces (½ cup) of fruit juice. Choose fruit more often than fruit juice. · Eat 4 to 5 servings of vegetables each day. A serving is 1 cup of lettuce or raw leafy vegetables, ½ cup of chopped or cooked vegetables, or 4 ounces (½ cup) of vegetable juice. Choose vegetables more often than vegetable juice. · Get 2 to 3 servings of low-fat and fat-free dairy each day. A serving is 8 ounces of milk, 1 cup of yogurt, or 1 ½ ounces of cheese. · Eat 6 to 8 servings of grains each day.  A serving is 1 slice of bread, 1 ounce of dry cereal, or ½ cup of cooked rice, pasta, or cooked cereal. Try to choose whole-grain products as much as possible. · Limit lean meat, poultry, and fish to 2 servings each day. A serving is 3 ounces, about the size of a deck of cards. · Eat 4 to 5 servings of nuts, seeds, and legumes (cooked dried beans, lentils, and split peas) each week. A serving is 1/3 cup of nuts, 2 tablespoons of seeds, or ½ cup of cooked beans or peas. · Limit fats and oils to 2 to 3 servings each day. A serving is 1 teaspoon of vegetable oil or 2 tablespoons of salad dressing. · Limit sweets and added sugars to 5 servings or less a week. A serving is 1 tablespoon jelly or jam, ½ cup sorbet, or 1 cup of lemonade. · Eat less than 2,300 milligrams (mg) of sodium a day. If you limit your sodium to 1,500 mg a day, you can lower your blood pressure even more. · Be aware that all of these are the suggested number of servings for people who eat 1,800 to 2,000 calories a day. Your recommended number of servings may be different if you need more or fewer calories. Tips for success  · Start small. Do not try to make dramatic changes to your diet all at once. You might feel that you are missing out on your favorite foods and then be more likely to not follow the plan. Make small changes, and stick with them. Once those changes become habit, add a few more changes. · Try some of the following:  ? Make it a goal to eat a fruit or vegetable at every meal and at snacks. This will make it easy to get the recommended amount of fruits and vegetables each day. ? Try yogurt topped with fruit and nuts for a snack or healthy dessert. ? Add lettuce, tomato, cucumber, and onion to sandwiches. ? Combine a ready-made pizza crust with low-fat mozzarella cheese and lots of vegetable toppings. Try using tomatoes, squash, spinach, broccoli, carrots, cauliflower, and onions. ?  Have a variety of cut-up vegetables with a low-fat dip as an appetizer instead of chips and dip. ? Sprinkle sunflower seeds or chopped almonds over salads. Or try adding chopped walnuts or almonds to cooked vegetables. ? Try some vegetarian meals using beans and peas. Add garbanzo or kidney beans to salads. Make burritos and tacos with mashed madrigal beans or black beans. Where can you learn more? Go to [email or web address] and sign in to your [employer] account. Enter O472 in the [employer] box to learn more about \"DASH Diet: Care Instructions. \"     If you do not have an account, please click on the \"Sign Up Now\" link. Current as of: April 29, 2021               Content Version: 13.0  © 6137-0518 Healthwise, Incorporated. Care instructions adapted under license by [facility] ([facility]). If you have questions about a medical condition or this instruction, always ask your healthcare professional. [address] any warranty or liability for your use of this information.

## 2021-12-16 NOTE — PROGRESS NOTES
Hypertension:  Patient is here for follow up chronic hypertension. This is  generally controlled on current medication regimen. Takes meds as directed and tolerates them well. Most recent labs reviewed with patient and are not remarkable. No symptoms from htn standpoint per ROS. Patient is  compliant with lifestyle modifications. Patient does not smoke. Comorbid conditions include htn. Patient's past medical, surgical, social and/or family history reviewed, updated in chart, and are non-contributory (unless otherwise stated). Medications and allergies also reviewed and updated in chart. Review of Systems:  Constitutional:  No fever, no fatigue, no chills, no headaches, no weight change  Dermatology:  No rash, no mole, no dry or sensitive skin  ENT:  No cough, no sore throat, no sinus pain, no runny nose, no ear pain  Cardiology:  No chest pain, no palpitations, no leg edema, no shortness of breath, no PND  Gastroenterology:  No dysphagia, no abdominal pain, no nausea, no vomiting, no constipation, no diarrhea, no heartburn  Musculoskeletal:  No joint pain, no leg cramps, no back pain, no muscle aches  Respiratory:  No shortness of breath, no orthopnea, no wheezing, no FALCON, no hemoptysis  Urology:  No blood in the urine, no urinary frequency, no urinary incontinence, no urinary urgency, no nocturia, no dysuria      Vitals:    12/16/21 1513   BP: 124/87   Pulse: 79   Resp: 18   Temp: 97.1 °F (36.2 °C)   SpO2: 96%   Weight: 160 lb (72.6 kg)   Height: 5' 6\" (1.676 m)       Physical Exam  Vitals and nursing note reviewed. Constitutional:       Appearance: She is well-developed. HENT:      Head: Normocephalic and atraumatic. Right Ear: External ear normal. There is impacted cerumen. Left Ear: External ear normal. There is impacted cerumen. Nose: Nose normal.   Eyes:      Conjunctiva/sclera: Conjunctivae normal.      Pupils: Pupils are equal, round, and reactive to light.    Neck: Thyroid: No thyromegaly. Cardiovascular:      Rate and Rhythm: Normal rate and regular rhythm. Heart sounds: Normal heart sounds. Pulmonary:      Effort: Pulmonary effort is normal.      Breath sounds: Normal breath sounds. No wheezing. Abdominal:      General: Bowel sounds are normal.      Palpations: Abdomen is soft. Tenderness: There is no abdominal tenderness. Musculoskeletal:         General: Normal range of motion. Cervical back: Normal range of motion and neck supple. Skin:     General: Skin is warm and dry. Findings: No rash. Neurological:      Mental Status: She is alert and oriented to person, place, and time. Deep Tendon Reflexes: Reflexes are normal and symmetric. Psychiatric:         Behavior: Behavior normal.         Assessment/Plan:      Gabriela Brian was seen today for hypertension. Diagnoses and all orders for this visit:    Essential hypertension  -     Comprehensive Metabolic Panel; Future  -     CBC Auto Differential; Future    Coronary artery disease involving native heart without angina pectoris, unspecified vessel or lesion type  -     Comprehensive Metabolic Panel; Future  -     Lipid Panel; Future    At high risk for falls  Home safety discussed in detail. Bilateral impacted cerumen  -     REMOVAL IMPACTED CERUMEN  -     carbamide peroxide (DEBROX) 6.5 % otic solution; Place 5 drops into both ears 2 times daily  Ceruminosis is noted. Wax is removed by syringing and manual debridement. Instructions for home care to prevent wax buildup are given. Patient unable to tolerate for full wax removal  Debrox drops prescribed. Flu vaccine need  -     INFLUENZA, QUADV, ADJUVANTED, 65 YRS =, IM, PF, PREFILL SYR, 0.5ML (FLUAD)    Colon cancer screening  -     POCT Fecal Immunochemical Test (FIT); Future      As above. Call or go to ED immediately if symptoms worsen or persist.  Return in about 3 months (around 3/16/2022) for htn., or sooner if necessary. Educational materials and/or home exercises printed for patient's review and were included in patient instructions on his/her After Visit Summary and given to patient at the end of visit. Counseled regarding above diagnosis, including possible risks and complications,  especially if left uncontrolled. Counseled regarding the possible side effects, risks, benefits and alternatives to treatment; patient and/or guardian verbalizes understanding, agrees, feels comfortable with and wishes to proceed with above treatment plan. Advised patient to call with any new medication issues, and read all Rx info from pharmacy to assure aware of all possible risks and side effects of medication before taking. Reviewed age and gender appropriate health screening exams and vaccinations. Advised patient regarding importance of keeping up with recommended health maintenance and to schedule as soon as possible if overdue, as this is important in assessing for undiagnosed pathology, especially cancer, as well as protecting against potentially harmful/life threatening disease. Patient and/or guardian verbalizes understanding and agrees with above counseling, assessment and plan. All questions answered. Preston Shin DO  12/16/2021    I have personally reviewed and updated the chief complaint, HPI, Past Medical, Family and Social History, as well as the above Review of Systems. On the basis of positive falls risk screening, assessment and plan is as follows: home safety tips provided.

## 2022-01-31 ENCOUNTER — TELEPHONE (OUTPATIENT)
Dept: FAMILY MEDICINE CLINIC | Age: 77
End: 2022-01-31

## 2022-01-31 DIAGNOSIS — I10 ESSENTIAL HYPERTENSION: ICD-10-CM

## 2022-01-31 DIAGNOSIS — I25.10 CORONARY ARTERY DISEASE INVOLVING NATIVE HEART WITHOUT ANGINA PECTORIS, UNSPECIFIED VESSEL OR LESION TYPE: ICD-10-CM

## 2022-01-31 RX ORDER — METOPROLOL SUCCINATE 25 MG/1
25 TABLET, EXTENDED RELEASE ORAL DAILY
Qty: 30 TABLET | Refills: 0 | Status: SHIPPED
Start: 2022-01-31 | End: 2022-04-05 | Stop reason: SDUPTHER

## 2022-01-31 RX ORDER — CLOPIDOGREL BISULFATE 75 MG/1
75 TABLET ORAL DAILY
Qty: 30 TABLET | Refills: 0 | Status: SHIPPED
Start: 2022-01-31 | End: 2022-04-05 | Stop reason: SDUPTHER

## 2022-02-01 ENCOUNTER — VIRTUAL VISIT (OUTPATIENT)
Dept: FAMILY MEDICINE CLINIC | Age: 77
End: 2022-02-01

## 2022-02-01 DIAGNOSIS — M32.9 LUPUS (HCC): ICD-10-CM

## 2022-02-01 DIAGNOSIS — F03.A0 MILD DEMENTIA: ICD-10-CM

## 2022-02-01 DIAGNOSIS — Z20.822 SUSPECTED COVID-19 VIRUS INFECTION: Primary | ICD-10-CM

## 2022-02-01 PROCEDURE — 99213 OFFICE O/P EST LOW 20 MIN: CPT | Performed by: FAMILY MEDICINE

## 2022-02-01 RX ORDER — BENZONATATE 100 MG/1
100 CAPSULE ORAL 3 TIMES DAILY PRN
Qty: 21 CAPSULE | Refills: 0 | Status: SHIPPED | OUTPATIENT
Start: 2022-02-01 | End: 2022-02-08

## 2022-02-01 RX ORDER — DOXYCYCLINE HYCLATE 100 MG
100 TABLET ORAL 2 TIMES DAILY
Qty: 20 TABLET | Refills: 0 | Status: SHIPPED | OUTPATIENT
Start: 2022-02-01 | End: 2022-02-11

## 2022-02-01 ASSESSMENT — ENCOUNTER SYMPTOMS
DIARRHEA: 0
SORE THROAT: 0
NAUSEA: 0
RHINORRHEA: 1
SINUS PRESSURE: 1
COUGH: 1
CONSTIPATION: 0
ABDOMINAL PAIN: 0
VOMITING: 0
BACK PAIN: 0
SHORTNESS OF BREATH: 1
WHEEZING: 0

## 2022-02-01 NOTE — PROGRESS NOTES
TeleMedicine Patient Consent    This visit was performed as a virtual video visit using a synchronous, two-way, audio-video telehealth technology platform. Patient identification was verified at the start of the visit, including the patient's telephone number and physical location. I discussed with the patient the nature of our telehealth visits, that:     1. Due to the nature of an audio- video modality, the only components of a physical exam that could be done are the elements supported by direct observation. 2. I would evaluate the patient and recommend diagnostics and treatments based on my assessment. 3. If it was felt that the patient should be evaluated in clinic or an emergency room setting, then they would be directed there. 4. Our sessions are not being recorded and that personal health information is protected. 5. Our team would provide follow up care in person if/when the patient needs it. Patient does agree to proceed with telemedicine consultation. Patient's location: home address in [place]. Is there anyone else present for this visit: Yes. Son Hugo Pack is with her  This visit was completed virtually using [email or web address]. me    Physician Location:   [place] [address]    Time spent: Greater than Not billed by time    2022    TELEHEALTH EVALUATION -- Audio or Visual (During [id number] public health emergency)    HPI:    Marnie Martin (:  1945) has requested an audio/video evaluation for the following concern(s):    Patient is presenting today for congestion that started about 1 week ago. She does have a cough which is productive of yellow/clear. She does have a runny nose, ear pain. She denies a sore throat. She does have sinus pressure. She has had sick contacts with colds. She denies nausea, vomiting, diarrhea. She does have shortness of breath at times. She denies fevers, headaches.       Review of Systems   Constitutional: Positive CHOLECYSTECTOMY  2016    cholelithias    INCONTINENCE SURGERY     ,   Social History     Tobacco Use    Smoking status: Former Smoker     Packs/day: 1.00     Years: 20.00     Pack years: 20.00     Start date: 1965     Quit date: 1986     Years since quittin.8    Smokeless tobacco: Never Used    Tobacco comment: quit in 80's   Vaping Use    Vaping Use: Never used   Substance Use Topics    Alcohol use: No    Drug use: No   ,   Family History   Family history unknown: Yes   ,   Immunization History   Administered Date(s) Administered    COVID-19, Pfizer Purple top, DILUTE for use, 12+ yrs, 30mcg/0.3mL dose 2021, 2021    Influenza Vaccine, unspecified formulation 10/15/2013    Influenza Virus Vaccine 02/15/2016    Influenza, High Dose (Fluzone 65 yrs and older) 2017    Influenza, Quadv, adjuvanted, 65 yrs +, IM, PF (Fluad) 2020, 2021    Influenza, Triv, inactivated, subunit, adjuvanted, IM (Fluad 65 yrs and older) 2019    PPD Test 2021    Pneumococcal Polysaccharide ([id number]) 2017   ,   Health Maintenance   Topic Date Due    DTaP/Tdap/Td vaccine (1 - Tdap) Never done    Colon cancer screen colonoscopy  Never done    Breast cancer screen  Never done    Shingles Vaccine (1 of 2) Never done   [employer] Visit (AWV)  Never done    COVID-19 Vaccine (3 - Booster for Bapul series) 2021    A1C test (Diabetic or Prediabetic)  2021    Depression Screen  2022    Lipid screen  2025    DEXA (modify frequency per FRAX score)  Completed    Flu vaccine  Completed    Pneumococcal 65+ years Vaccine  Completed    Hepatitis C screen  Completed    Hepatitis A vaccine  Aged Out    Hepatitis B vaccine  Aged Out    Hib vaccine  Aged Out    Meningococcal (ACWY) vaccine  Aged Out       PHYSICAL EXAMINATION:  [ INSTRUCTIONS:  \"[x]\" Indicates a positive item  \"[]\" Indicates a negative item  -- DELETE ALL ITEMS NOT EXAMINED]  Vital Signs: (As obtained by patient/caregiver or practitioner observation)    Blood pressure-  Heart rate-    Respiratory rate-    Temperature-  Pulse oximetry-   Vitals unable to be obtained. Constitutional: [x] Appears well-developed and well-nourished [x] No apparent distress      [] Abnormal-   Mental status  [x] Alert and awake  [x] Oriented to person/place/time [x]Able to follow commands      Eyes:  EOM    [x]  Normal  [] Abnormal-  Sclera  [x]  Normal  [] Abnormal -         Discharge [x]  None visible  [] Abnormal -    HENT:   [x] Normocephalic, atraumatic. [] Abnormal   [x] Mouth/Throat: Mucous membranes are moist.     External Ears [x] Normal  [] Abnormal-     Neck: [x] No visualized mass     Pulmonary/Chest: [x] Respiratory effort normal.  [x] No visualized signs of difficulty breathing or respiratory distress        [] Abnormal-      Musculoskeletal:   [] Normal gait with no signs of ataxia         [x] Normal range of motion of neck        [] Abnormal-       Neurological:        [x] No Facial Asymmetry (Cranial nerve 7 motor function) (limited exam to video visit)          [] No gaze palsy        [] Abnormal-         Skin:        [x] No significant exanthematous lesions or discoloration noted on facial skin         [] Abnormal-            Psychiatric:       [x] Normal Affect [x] No Hallucinations        [] Abnormal-       Other pertinent observable physical exam findings-     Due to this being a TeleHealth encounter, evaluation of the following organ systems is limited: Vitals/Constitutional/EENT/Resp/CV/GI//MS/Neuro/Skin/Heme-Lymph-Imm. ASSESSMENT/PLAN:  Yeimy Acharya was seen today for congestion. Diagnoses and all orders for this visit:    Suspected COVID-19 virus infection  -     COVID-19 Ambulatory; Future  -     benzonatate (TESSALON PERLES) 100 MG capsule; Take 1 capsule by mouth 3 times daily as needed for Cough  -     doxycycline hyclate (VIBRA-TABS) 100 MG tablet;  Take 1 tablet by mouth 2 times daily for 10 days  Likely viral illness  Lack of antibiotic effectiveness discussed   Symptomatic relief reviewed  Patient to return to clinic if symptoms worsen or do not improve. Pt understands and is in agreement with the plan. Lupus ([facility] [address]  Stable at this time. Mild dementia ([address]  Stable at this time. Return if symptoms worsen or fail to improve. An  electronic signature was used to authenticate this note. --Nhi Galvez, DO on 2/1/2022 at 3:52 }    Pursuant to the emergency declaration under the [facility] waiver authority and the [employer] Act, this Virtual  Visit was conducted, with patient's consent, to reduce the patient's risk of exposure to COVID-19 and provide continuity of care for an established patient. Services were provided through a video synchronous discussion virtually to substitute for in-person clinic visit.

## 2022-02-01 NOTE — PATIENT INSTRUCTIONS
Patient Education        doxycycline (oral/injection)  Pronunciation:  DOX deneen reid  Brand:  Acticlate, Adoxa, Alodox, Avidoxy, Doryx, Mondoxyne NL, Monodox, Morgidox, Okebo, Oracea, Oraxyl, Targadox, Vibramycin  What is the most important information I should know about doxycycline? You should not take this medicine if you are allergic to any tetracycline antibiotic. Children younger than 6years old should use doxycycline only in cases of severe or life-threatening conditions. This medicine can cause permanent yellowing or graying of the teeth in children  Using doxycycline during pregnancy could harm the unborn baby or cause permanent tooth discoloration later in the baby's life. What is doxycycline? Doxycycline is a tetracycline antibiotic that  Doxycycline is used to treat many different bacterial infections, such as acne, urinary tract infections, intestinal infections, eye infections, gonorrhea, chlamydia, periodontitis (gum disease), and others. Doxycycline is also used to treat blemishes, bumps, and acne-like lesions caused by rosacea. Doxycycline will not treat facial redness caused by rosacea. Some forms of doxycycline are used to prevent malaria, to treat anthrax, or to treat infections caused by mites, ticks, or lice. Doxycycline may also be used for purposes not listed in this medication guide. What should I discuss with my healthcare provider before taking doxycycline? You should not take this medicine if you are allergic to doxycycline or other tetracycline antibiotics such as demeclocycline, minocycline, tetracycline, or tigecycline. Tell your doctor if you have ever had:  · liver disease;  · kidney disease;  · asthma or sulfite allergy;  · increased pressure inside your skull; or  · if you also take isotretinoin, seizure medicine, or a blood thinner such as warfarin (Coumadin).   If you are using doxycycline to treat gonorrhea, your doctor may test you to make sure you do not also dosing syringe provided, or with a special dose-measuring spoon or medicine cup. If you do not have a dose-measuring device, ask your pharmacist for one. If you take doxycycline to prevent malaria: Start taking the medicine 1 or 2 days before entering an area where malaria is common. Continue taking the medicine every day during your stay and for at least 4 weeks after you leave the area. Doxycycline is usually given by injection only if you are unable to take the medicine by mouth. A healthcare provider will give you this injection as an infusion into a vein. Use this medicine for the full prescribed length of time, even if your symptoms quickly improve. Skipping doses can increase your risk of infection that is resistant to medication. Doxycycline will not treat a viral infection such as the flu or a common cold. Store at room temperature away from moisture, heat, and light. Throw away any unused medicine after the expiration date on the label has passed. Using  doxycycline can cause damage to your kidneys. What happens if I miss a dose? Take the medicine as soon as you can, but skip the missed dose if it is almost time for your next dose. Do not take two doses at one time. What happens if I overdose? Seek emergency medical attention or call the Poison Help line at 1-192.448.6510. What should I avoid while taking doxycycline? Do not take iron supplements, multivitamins, calcium supplements, antacids, or laxatives within 2 hours before or after taking doxycycline. Avoid taking any other antibiotics with doxycycline unless your doctor has told you to. Doxycycline could make you sunburn more easily. Avoid sunlight or tanning beds. Wear protective clothing and use sunscreen (SPF 30 or higher) when you are outdoors. Antibiotic medicines can cause diarrhea, which may be a sign of a new infection. If you have diarrhea that is watery or bloody, call your doctor.  Do not use anti-diarrhea medicine unless your doctor tells you to. What are the possible side effects of doxycycline? Get emergency medical help if you have signs of an allergic reaction (hives, difficult breathing, swelling in your face or throat) or a severe skin reaction (fever, sore throat, burning in your eyes, skin pain, red or purple skin rash that spreads and causes blistering and peeling). Seek medical treatment if you have a serious drug reaction that can affect many parts of your body. Symptoms may include: skin rash, fever, swollen glands, flu-like symptoms, muscle aches, severe weakness, unusual bruising, or yellowing of your skin or eyes. This reaction may occur several weeks after you began using doxycycline. Call your doctor at once if you have:  · severe stomach pain, diarrhea that is watery or bloody;  · throat irritation, trouble swallowing;  · chest pain, irregular heart rhythm, feeling short of breath;  · little or no urination;  · low white blood cell counts --fever, chills, swollen glands, body aches, weakness, pale skin, easy bruising or bleeding;  · increased pressure inside the skull --severe headaches, ringing in your ears, dizziness, nausea, vision problems, pain behind your eyes; or  · signs of liver or pancreas problems --loss of appetite, upper stomach pain (that may spread to your back), tiredness, nausea or vomiting, fast heart rate, dark urine, jaundice (yellowing of the skin or eyes). Common side effects may include:  · nausea, vomiting, upset stomach, loss of appetite;  · mild diarrhea;  · skin rash or itching;  · darkened skin color; or  · vaginal itching or discharge. This is not a complete list of side effects and others may occur. Call your doctor for medical advice about side effects. You may report side effects to FDA at 2-399-FDA-6243. What other drugs will affect doxycycline? Sometimes it is not safe to use certain medications at the same time.  Some drugs can affect your blood levels of other drugs you take, which may increase side effects or make the medications less effective. Other drugs may affect doxycycline, including prescription and over-the-counter medicines, vitamins, and herbal products. Tell your doctor about all your current medicines and any medicine you start or stop using. Where can I get more information? Your pharmacist can provide more information about doxycycline. Remember, keep this and all other medicines out of the reach of children, never share your medicines with others, and use this medication only for the indication prescribed. Every effort has been made to ensure that the information provided by [address] is accurate, up-to-date, and complete, but no guarantee is made to that effect. Drug information contained herein may be time sensitive. [facility] information has been compiled for use by healthcare practitioners and consumers in the [place] and therefore [facility] does not warrant that uses outside of the [place] are appropriate, unless specifically indicated otherwise. [facility]'s drug information does not endorse drugs, diagnose patients or recommend therapy. [facility] drug information is an informational resource designed to assist licensed healthcare practitioners in caring for their patients and/or to serve consumers viewing this service as a supplement to, and not a substitute for, the expertise, skill, knowledge and judgment of healthcare practitioners. The absence of a warning for a given drug or drug combination in no way should be construed to indicate that the drug or drug combination is safe, effective or appropriate for any given patient. [facility] does not assume any responsibility for any aspect of healthcare administered with the aid of information [facility] provides. The information contained herein is not intended to cover all possible uses, directions, precautions, warnings, drug interactions, allergic reactions, or adverse effects.  If you have questions about the drugs you are taking, check with your doctor, nurse or pharmacist.  Copyright 2430-6213 [address]: 21.04. Revision date: 11/4/2020. Care instructions adapted under license by [facility] ([facility]). If you have questions about a medical condition or this instruction, always ask your healthcare professional. [place] [address] any warranty or liability for your use of this information.

## 2022-04-05 ENCOUNTER — TELEPHONE (OUTPATIENT)
Dept: FAMILY MEDICINE CLINIC | Age: 77
End: 2022-04-05

## 2022-04-05 ENCOUNTER — OFFICE VISIT (OUTPATIENT)
Dept: FAMILY MEDICINE CLINIC | Age: 77
End: 2022-04-05

## 2022-04-05 VITALS
RESPIRATION RATE: 18 BRPM | BODY MASS INDEX: 25.39 KG/M2 | OXYGEN SATURATION: 97 % | DIASTOLIC BLOOD PRESSURE: 82 MMHG | HEART RATE: 66 BPM | WEIGHT: 158 LBS | HEIGHT: 66 IN | SYSTOLIC BLOOD PRESSURE: 130 MMHG | TEMPERATURE: 97.2 F

## 2022-04-05 DIAGNOSIS — R05.3 CHRONIC COUGH: ICD-10-CM

## 2022-04-05 DIAGNOSIS — I10 ESSENTIAL HYPERTENSION: Primary | ICD-10-CM

## 2022-04-05 DIAGNOSIS — I25.10 CORONARY ARTERY DISEASE INVOLVING NATIVE HEART WITHOUT ANGINA PECTORIS, UNSPECIFIED VESSEL OR LESION TYPE: ICD-10-CM

## 2022-04-05 DIAGNOSIS — H61.23 BILATERAL IMPACTED CERUMEN: ICD-10-CM

## 2022-04-05 PROCEDURE — 99214 OFFICE O/P EST MOD 30 MIN: CPT | Performed by: FAMILY MEDICINE

## 2022-04-05 RX ORDER — CLOPIDOGREL BISULFATE 75 MG/1
75 TABLET ORAL DAILY
Qty: 30 TABLET | Refills: 3 | Status: SHIPPED | OUTPATIENT
Start: 2022-04-05

## 2022-04-05 RX ORDER — METOPROLOL SUCCINATE 25 MG/1
25 TABLET, EXTENDED RELEASE ORAL DAILY
Qty: 30 TABLET | Refills: 3 | Status: SHIPPED
Start: 2022-04-05 | End: 2022-08-15 | Stop reason: SDUPTHER

## 2022-04-05 SDOH — HEALTH STABILITY: PHYSICAL HEALTH: ON AVERAGE, HOW MANY MINUTES DO YOU ENGAGE IN EXERCISE AT THIS LEVEL?: 20 MIN

## 2022-04-05 SDOH — HEALTH STABILITY: PHYSICAL HEALTH: ON AVERAGE, HOW MANY DAYS PER WEEK DO YOU ENGAGE IN MODERATE TO STRENUOUS EXERCISE (LIKE A BRISK WALK)?: 4 DAYS

## 2022-04-05 ASSESSMENT — PATIENT HEALTH QUESTIONNAIRE - PHQ9
SUM OF ALL RESPONSES TO PHQ QUESTIONS 1-9: 0
SUM OF ALL RESPONSES TO PHQ9 QUESTIONS 1 & 2: 0
2. FEELING DOWN, DEPRESSED OR HOPELESS: 0
1. LITTLE INTEREST OR PLEASURE IN DOING THINGS: 0
SUM OF ALL RESPONSES TO PHQ QUESTIONS 1-9: 0
SUM OF ALL RESPONSES TO PHQ9 QUESTIONS 1 & 2: 0
1. LITTLE INTEREST OR PLEASURE IN DOING THINGS: 0
SUM OF ALL RESPONSES TO PHQ QUESTIONS 1-9: 0
2. FEELING DOWN, DEPRESSED OR HOPELESS: 0
SUM OF ALL RESPONSES TO PHQ QUESTIONS 1-9: 0

## 2022-04-05 ASSESSMENT — LIFESTYLE VARIABLES
HOW OFTEN DO YOU HAVE SIX OR MORE DRINKS ON ONE OCCASION: 1
HOW OFTEN DO YOU HAVE A DRINK CONTAINING ALCOHOL: NEVER
HOW OFTEN DO YOU HAVE A DRINK CONTAINING ALCOHOL: 1
HOW OFTEN DO YOU HAVE A DRINK CONTAINING ALCOHOL: NEVER

## 2022-04-05 NOTE — PROGRESS NOTES
Hypertension:  Patient is here for follow up chronic hypertension. This is  generally controlled on current medication regimen. Takes meds as directed and tolerates them well. Most recent labs reviewed with patient and are not remarkable. No symptoms from htn standpoint per ROS. Patient is  compliant with lifestyle modifications. Patient does not smoke. Comorbid conditions include HTN, history of cerebral aneurysm. Her son states that they had what they assume was COVID. He states that since that time, she has had a dry cough. She states it is productive at times of green sputum. Patient's past medical, surgical, social and/or family history reviewed, updated in chart, and are non-contributory (unless otherwise stated). Medications and allergies also reviewed and updated in chart. Review of Systems:  Constitutional:  No fever, no fatigue, no chills, no headaches, no weight change  Dermatology:  No rash, no mole, no dry or sensitive skin  ENT:  + cough, no sore throat, no sinus pain, no runny nose, no ear pain  Cardiology:  No chest pain, no palpitations, no leg edema, no shortness of breath, no PND  Gastroenterology:  No dysphagia, no abdominal pain, no nausea, no vomiting, no constipation, no diarrhea, no heartburn  Musculoskeletal:  No joint pain, no leg cramps, no back pain, no muscle aches  Respiratory:  No shortness of breath, no orthopnea, no wheezing, no FALCON, no hemoptysis  Urology:  No blood in the urine, no urinary frequency, no urinary incontinence, no urinary urgency, no nocturia, no dysuria      Vitals:    04/05/22 1315 04/05/22 1348   BP: (!) 160/96 130/82   Pulse: 66    Resp: 18    Temp: 97.2 °F (36.2 °C)    TempSrc: Temporal    SpO2: 97%    Weight: 158 lb (71.7 kg)    Height: 5' 6\" (1.676 m)        Physical Exam  Vitals and nursing note reviewed. Constitutional:       Appearance: She is well-developed. HENT:      Head: Normocephalic and atraumatic.       Right Ear: External ear normal. There is impacted cerumen. Left Ear: External ear normal. There is impacted cerumen. Nose: Nose normal.   Eyes:      Conjunctiva/sclera: Conjunctivae normal.      Pupils: Pupils are equal, round, and reactive to light. Neck:      Thyroid: No thyromegaly. Cardiovascular:      Rate and Rhythm: Normal rate and regular rhythm. Heart sounds: Normal heart sounds. Pulmonary:      Effort: Pulmonary effort is normal.      Breath sounds: Normal breath sounds. No wheezing. Abdominal:      General: Bowel sounds are normal.      Palpations: Abdomen is soft. Tenderness: There is no abdominal tenderness. Musculoskeletal:         General: Normal range of motion. Cervical back: Normal range of motion and neck supple. Skin:     General: Skin is warm and dry. Findings: No rash. Neurological:      Mental Status: She is alert and oriented to person, place, and time. Deep Tendon Reflexes: Reflexes are normal and symmetric. Psychiatric:         Behavior: Behavior normal.         Assessment/Plan:      Taylor Maya was seen today for hypertension. Diagnoses and all orders for this visit:    Essential hypertension  -     metoprolol succinate (TOPROL XL) 25 MG extended release tablet; Take 1 tablet by mouth daily  -     Comprehensive Metabolic Panel; Future  -     CBC with Auto Differential; Future  -     TSH; Future  Blood pressure well controlled  Continue current meds  Labs ordered  Diet and exercise were discussed and recommended to the patient. Coronary artery disease involving native heart without angina pectoris, unspecified vessel or lesion type  -     metoprolol succinate (TOPROL XL) 25 MG extended release tablet; Take 1 tablet by mouth daily  -     clopidogrel (PLAVIX) 75 MG tablet; Take 1 tablet by mouth daily  -     Comprehensive Metabolic Panel; Future  -     CBC with Auto Differential; Future  -     Hemoglobin A1C; Future  -     Lipid Panel;  Future  - TSH; Future  Stable  Labs ordered  Continue plavix as prescribed. Bilateral impacted cerumen  -     REMOVAL IMPACTED CERUMEN  -     carbamide peroxide (DEBROX) 6.5 % otic solution; Place 5 drops into both ears 2 times daily  -     Procedure note:  Cerumen impaction(s) removed in office today by using waterpik and lighted loop curette. Also used warm water with the ear wash system. Patient tolerated the procedure well but we were unsuccessful  Will send debrox to the pharmacy    Chronic cough  -     XR CHEST (2 VW); Future  Discussed that likely cause is post nasal drainage but son requesting x-ray. As above. Call or go to ED immediately if symptoms worsen or persist.  No follow-ups on file. , or sooner if necessary. Educational materials and/or home exercises printed for patient's review and were included in patient instructions on his/her After Visit Summary and given to patient at the end of visit. Counseled regarding above diagnosis, including possible risks and complications,  especially if left uncontrolled. Counseled regarding the possible side effects, risks, benefits and alternatives to treatment; patient and/or guardian verbalizes understanding, agrees, feels comfortable with and wishes to proceed with above treatment plan. Advised patient to call with any new medication issues, and read all Rx info from pharmacy to assure aware of all possible risks and side effects of medication before taking. Reviewed age and gender appropriate health screening exams and vaccinations. Advised patient regarding importance of keeping up with recommended health maintenance and to schedule as soon as possible if overdue, as this is important in assessing for undiagnosed pathology, especially cancer, as well as protecting against potentially harmful/life threatening disease. Patient and/or guardian verbalizes understanding and agrees with above counseling, assessment and plan.     All questions answered. Becky Wayne DO  4/5/2022    I have personally reviewed and updated the chief complaint, HPI, Past Medical, Family and Social History, as well as the above Review of Systems.

## 2022-04-05 NOTE — PATIENT INSTRUCTIONS
Patient Education        carbamide peroxide (otic)  Pronunciation: DINH ba mide per OX checo [place] tik  Brand: Auraphene-B, Debrox, Ear Wax, Ear Wax Removal, Mollifene, Murine Ear Drops  What is the most important information I should know about carbamide peroxide? You should not use this medicine if you have a hole in your ear drum (ruptured ear drum), or if you have any signs of ear infection or injury, such as pain,warmth, swelling, drainage, or bleeding. What is carbamide peroxide? Carbamide peroxide otic (for the ears) is used to soften and loosen ear wax,making it easier to remove. Carbamide peroxide may also be used for purposes not listed in this medicationguide. What should I discuss with my healthcare provider before using carbamide peroxide? You should not use carbamide peroxide otic if you are allergic to it, or if youhave a hole in your ear drum (ruptured ear drum). Ask a doctor or pharmacist if it is safe for you to use this medicine if youhave other medical conditions, especially:   recent ear surgery or injury;   ear pain, itching, or other irritation;   drainage, discharge, or bleeding from the ear; or   warmth or swelling around the ear. Carbamide peroxide otic should not be used on a child younger than 15years old. How should I use carbamide peroxide? Use exactly as directed on the label, or as prescribed by your doctor. Do notuse in larger or smaller amounts or for longer than recommended. Carbamide peroxide otic comes with patient instructions for safe and effective use. Follow these directions carefully. Ask your doctor or pharmacist if youhave any questions. Wash your hands before and after using this medicine. To use the ear drops:   Lie down or tilt your head with your ear facing upward. Open the ear canal by gently pulling your ear back, or pulling downward on the earlobe when giving this medicine to a child.    Hold the dropper upside down over your ear and drop the correct number of drops into the ear.  You may hear a bubbling sound inside your ear. This is caused by the foaming action of carbamide peroxide, which helps break up the wax inside your ear.  Stay lying down or with your head tilted for at least 5 minutes. You may use a small piece of cotton to plug the ear and keep the medicine from draining out. Follow your doctor's instructions about the use of cotton.  Do not touch the dropper tip or place it directly in your ear. It may become contaminated. Wipe the tip with a clean tissue but do not wash with water or soap. Carbamide peroxide may be packaged with a bulb syringe that is used to flush out your ear with water. To use the bulb syringe:   Fill the syringe with warm water that is body temperature (no warmer than 98 degrees F). Do not use hot or cold water.  Hold your head sideways with your ear over a sink or bowl. Gently pull your ear back to open the ear canal. Place the tip of the bulb syringe at the opening of your ear canal. Do not insert the tip into your ear.  Squeeze the bulb syringe gently to release the water into your ear. Do not squirt the water with any force, or you could damage your ear drum.  Remove the syringe and allow the water to drain from your ear into the sink or bowl. Do not use carbamide peroxide for longer than 4 days in a row. Call your doctor if you still have excessive earwax after using this medicine, or if yoursymptoms get worse. Clean the bulb syringe by filling it with plain water and emptying it several times. Do not use soap or other cleaning chemicals. Allow the syringe to airdry. Keep the medicine bottle tightly closed and store it in the outer carton atroom temperature, away from moisture and heat. What happens if I miss a dose? Since carbamide peroxide otic is used when needed, you may not be on a dosing schedule. If you are on a schedule, use the missed dose as soon as you remember.  Skip the missed dose if it is almost time for your next scheduled dose. Do not use extra medicine to make up the missed dose. What happens if I overdose? An overdose of carbamide peroxide otic is not expected to be dangerous. Seek emergency medical attention or call the Poison Help line at 1-976.775.2704 Maryjo Claudio has accidentally swallowed the medication. What should I avoid while using carbamide peroxide? Avoid getting this medicine in your eyes or mouth. Do not use other ear drops unless your doctor has told you to. What are the possible side effects of carbamide peroxide? Get emergency medical help if you have signs of an allergic reaction: hives; difficult breathing; swelling of your face, lips, tongue, or throat. Stop using carbamide peroxide otic and call your doctor at once if you have:   dizziness; or   new or worsening ear problems. Common side effects may include:   a foaming or crackling sound in the ear after using the ear drops;   temporary decrease in hearing after using the drops;   mild feeling of fullness in the ear; or   mild itching inside the ear. This is not a complete list of side effects and others may occur. Call your doctor for medical advice about side effects. You may report side effects toFDA at 3-992-DVB-1163. What other drugs will affect carbamide peroxide? It is not likely that other drugs you take orally or inject will have an effect on carbamide peroxide used in the ears. But many drugs can interact with each other. Tell each of your healthcare providers about all medicines you use, including prescription and over-the-counter medicines, vitamins, and herbalproducts. Where can I get more information? Your pharmacist can provide more information about carbamide peroxide. Remember, keep this and all other medicines out of the reach of children, never share your medicines with others, and use this medication only for the indication prescribed.    Every effort has been made to ensure that the information provided by [address] is accurate, up-to-date, and complete, but no guarantee is made to that effect. Drug information contained herein may be time sensitive. [facility] information has been compiled for use by healthcare practitioners and consumers in the [place] and therefore [facility] does not warrant that uses outside of the [place] are appropriate, unless specifically indicated otherwise. [facility]'s drug information does not endorse drugs, diagnose patients or recommend therapy. [facility]'s drug information is an informational resource designed to assist licensed healthcare practitioners in caring for their patients and/or to serve consumers viewing this service as a supplement to, and not a substitute for, the expertise, skill, knowledge and judgment of healthcare practitioners. The absence of a warning for a given drug or drug combination in no way should be construed to indicate that the drug or drug combination is safe, effective or appropriate for any given patient. [facility] does not assume any responsibility for any aspect of healthcare administered with the aid of information [facility] provides. The information contained herein is not intended to cover all possible uses, directions, precautions, warnings, drug interactions, allergic reactions, or adverse effects. If you have questions about the drugs you are taking, check with yourdoctor, nurse or pharmacist.  Copyright 6316-1256 [address] [place] [address] Dr ROBERTSON. Version: 4.01. Revision date: 3/15/2017. Care instructions adapted under license by [facility] ([facility]). If you have questions about a medical condition or this instruction, always ask your healthcare professional. [place] [address] any warranty or liability for your use of this information.

## 2022-04-05 NOTE — TELEPHONE ENCOUNTER
Pt son wanted to have debrox sent to [facility] pharmacy on [address] and wanted to have pt get chest xray for her cough. He stated the cough has been going on for 2 years. Please advise.

## 2022-08-15 DIAGNOSIS — I25.10 CORONARY ARTERY DISEASE INVOLVING NATIVE HEART WITHOUT ANGINA PECTORIS, UNSPECIFIED VESSEL OR LESION TYPE: ICD-10-CM

## 2022-08-15 DIAGNOSIS — I10 ESSENTIAL HYPERTENSION: ICD-10-CM

## 2022-08-15 RX ORDER — METOPROLOL SUCCINATE 25 MG/1
25 TABLET, EXTENDED RELEASE ORAL DAILY
Qty: 30 TABLET | Refills: 3 | Status: SHIPPED | OUTPATIENT
Start: 2022-08-15

## 2023-06-02 DIAGNOSIS — I10 ESSENTIAL HYPERTENSION: ICD-10-CM

## 2023-06-02 DIAGNOSIS — I25.10 CORONARY ARTERY DISEASE INVOLVING NATIVE HEART WITHOUT ANGINA PECTORIS, UNSPECIFIED VESSEL OR LESION TYPE: ICD-10-CM

## 2023-06-02 RX ORDER — CLOPIDOGREL BISULFATE 75 MG/1
75 TABLET ORAL DAILY
Qty: 30 TABLET | Refills: 0 | Status: SHIPPED | OUTPATIENT
Start: 2023-06-02

## 2023-06-02 RX ORDER — METOPROLOL SUCCINATE 25 MG/1
25 TABLET, EXTENDED RELEASE ORAL DAILY
Qty: 30 TABLET | Refills: 0 | Status: SHIPPED | OUTPATIENT
Start: 2023-06-02
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
## 2024-12-24 ENCOUNTER — APPOINTMENT (OUTPATIENT)
Dept: CT IMAGING | Age: 79
DRG: 071 | End: 2024-12-24
Payer: MEDICARE

## 2024-12-24 ENCOUNTER — APPOINTMENT (OUTPATIENT)
Dept: GENERAL RADIOLOGY | Age: 79
DRG: 071 | End: 2024-12-24
Payer: MEDICARE

## 2024-12-24 ENCOUNTER — HOSPITAL ENCOUNTER (INPATIENT)
Age: 79
LOS: 4 days | Discharge: NURSING, REHAB OR LONG-TERM CARE | DRG: 071 | End: 2024-12-28
Attending: EMERGENCY MEDICINE | Admitting: STUDENT IN AN ORGANIZED HEALTH CARE EDUCATION/TRAINING PROGRAM
Payer: MEDICARE

## 2024-12-24 DIAGNOSIS — N30.00 ACUTE CYSTITIS WITHOUT HEMATURIA: ICD-10-CM

## 2024-12-24 DIAGNOSIS — R41.82 ALTERED MENTAL STATUS, UNSPECIFIED ALTERED MENTAL STATUS TYPE: Primary | ICD-10-CM

## 2024-12-24 DIAGNOSIS — R47.01 APHASIA: ICD-10-CM

## 2024-12-24 PROBLEM — N39.0 COMPLICATED UTI (URINARY TRACT INFECTION): Status: ACTIVE | Noted: 2024-12-24

## 2024-12-24 LAB
ALBUMIN SERPL-MCNC: 3.7 G/DL (ref 3.5–5.2)
ALP SERPL-CCNC: 150 U/L (ref 35–104)
ALT SERPL-CCNC: 27 U/L (ref 0–32)
ANION GAP SERPL CALCULATED.3IONS-SCNC: 11 MMOL/L (ref 7–16)
AST SERPL-CCNC: 55 U/L (ref 0–31)
BACTERIA URNS QL MICRO: ABNORMAL
BASOPHILS # BLD: 0.04 K/UL (ref 0–0.2)
BASOPHILS NFR BLD: 0 % (ref 0–2)
BILIRUB SERPL-MCNC: 0.8 MG/DL (ref 0–1.2)
BILIRUB UR QL STRIP: NEGATIVE
BUN SERPL-MCNC: 20 MG/DL (ref 6–23)
CALCIUM SERPL-MCNC: 9.8 MG/DL (ref 8.6–10.2)
CHLORIDE SERPL-SCNC: 104 MMOL/L (ref 98–107)
CLARITY UR: CLEAR
CO2 SERPL-SCNC: 24 MMOL/L (ref 22–29)
COLOR UR: YELLOW
CREAT SERPL-MCNC: 1.1 MG/DL (ref 0.5–1)
EOSINOPHIL # BLD: 0.05 K/UL (ref 0.05–0.5)
EOSINOPHILS RELATIVE PERCENT: 1 % (ref 0–6)
EPI CELLS #/AREA URNS HPF: ABNORMAL /HPF
ERYTHROCYTE [DISTWIDTH] IN BLOOD BY AUTOMATED COUNT: 14.8 % (ref 11.5–15)
GFR, ESTIMATED: 53 ML/MIN/1.73M2
GLUCOSE SERPL-MCNC: 106 MG/DL (ref 74–99)
GLUCOSE UR STRIP-MCNC: NEGATIVE MG/DL
HCT VFR BLD AUTO: 46.1 % (ref 34–48)
HGB BLD-MCNC: 14.6 G/DL (ref 11.5–15.5)
HGB UR QL STRIP.AUTO: NEGATIVE
IMM GRANULOCYTES # BLD AUTO: 0.07 K/UL (ref 0–0.58)
IMM GRANULOCYTES NFR BLD: 1 % (ref 0–5)
KETONES UR STRIP-MCNC: NEGATIVE MG/DL
LACTATE BLDV-SCNC: 1.6 MMOL/L (ref 0.5–1.9)
LEUKOCYTE ESTERASE UR QL STRIP: ABNORMAL
LYMPHOCYTES NFR BLD: 1.94 K/UL (ref 1.5–4)
LYMPHOCYTES RELATIVE PERCENT: 22 % (ref 20–42)
MCH RBC QN AUTO: 32.2 PG (ref 26–35)
MCHC RBC AUTO-ENTMCNC: 31.7 G/DL (ref 32–34.5)
MCV RBC AUTO: 101.8 FL (ref 80–99.9)
MONOCYTES NFR BLD: 1.11 K/UL (ref 0.1–0.95)
MONOCYTES NFR BLD: 13 % (ref 2–12)
NEUTROPHILS NFR BLD: 64 % (ref 43–80)
NEUTS SEG NFR BLD: 5.69 K/UL (ref 1.8–7.3)
NITRITE UR QL STRIP: NEGATIVE
PH UR STRIP: 6 [PH] (ref 5–9)
PLATELET # BLD AUTO: 259 K/UL (ref 130–450)
PMV BLD AUTO: 9.8 FL (ref 7–12)
POTASSIUM SERPL-SCNC: 4.4 MMOL/L (ref 3.5–5)
PROCALCITONIN SERPL-MCNC: 0.11 NG/ML (ref 0–0.08)
PROT SERPL-MCNC: 9.2 G/DL (ref 6.4–8.3)
PROT UR STRIP-MCNC: NEGATIVE MG/DL
RBC # BLD AUTO: 4.53 M/UL (ref 3.5–5.5)
RBC #/AREA URNS HPF: ABNORMAL /HPF
SODIUM SERPL-SCNC: 139 MMOL/L (ref 132–146)
SP GR UR STRIP: 1.02 (ref 1–1.03)
UROBILINOGEN UR STRIP-ACNC: 0.2 EU/DL (ref 0–1)
WBC #/AREA URNS HPF: ABNORMAL /HPF
WBC OTHER # BLD: 8.9 K/UL (ref 4.5–11.5)

## 2024-12-24 PROCEDURE — 70450 CT HEAD/BRAIN W/O DYE: CPT

## 2024-12-24 PROCEDURE — P9612 CATHETERIZE FOR URINE SPEC: HCPCS

## 2024-12-24 PROCEDURE — 99222 1ST HOSP IP/OBS MODERATE 55: CPT

## 2024-12-24 PROCEDURE — 93005 ELECTROCARDIOGRAM TRACING: CPT

## 2024-12-24 PROCEDURE — 81001 URINALYSIS AUTO W/SCOPE: CPT

## 2024-12-24 PROCEDURE — 99285 EMERGENCY DEPT VISIT HI MDM: CPT

## 2024-12-24 PROCEDURE — 2580000003 HC RX 258

## 2024-12-24 PROCEDURE — 71045 X-RAY EXAM CHEST 1 VIEW: CPT

## 2024-12-24 PROCEDURE — 84145 PROCALCITONIN (PCT): CPT

## 2024-12-24 PROCEDURE — 85025 COMPLETE CBC W/AUTO DIFF WBC: CPT

## 2024-12-24 PROCEDURE — 87086 URINE CULTURE/COLONY COUNT: CPT

## 2024-12-24 PROCEDURE — 80053 COMPREHEN METABOLIC PANEL: CPT

## 2024-12-24 PROCEDURE — 87040 BLOOD CULTURE FOR BACTERIA: CPT

## 2024-12-24 PROCEDURE — 83605 ASSAY OF LACTIC ACID: CPT

## 2024-12-24 PROCEDURE — 2060000000 HC ICU INTERMEDIATE R&B

## 2024-12-24 RX ORDER — ACETAMINOPHEN 650 MG/1
650 SUPPOSITORY RECTAL EVERY 6 HOURS PRN
Status: DISCONTINUED | OUTPATIENT
Start: 2024-12-24 | End: 2024-12-28 | Stop reason: HOSPADM

## 2024-12-24 RX ORDER — POLYETHYLENE GLYCOL 3350 17 G/17G
17 POWDER, FOR SOLUTION ORAL DAILY PRN
Status: DISCONTINUED | OUTPATIENT
Start: 2024-12-24 | End: 2024-12-28 | Stop reason: HOSPADM

## 2024-12-24 RX ORDER — SODIUM CHLORIDE 0.9 % (FLUSH) 0.9 %
5-40 SYRINGE (ML) INJECTION EVERY 12 HOURS SCHEDULED
Status: DISCONTINUED | OUTPATIENT
Start: 2024-12-24 | End: 2024-12-28 | Stop reason: HOSPADM

## 2024-12-24 RX ORDER — ONDANSETRON 2 MG/ML
4 INJECTION INTRAMUSCULAR; INTRAVENOUS EVERY 6 HOURS PRN
Status: DISCONTINUED | OUTPATIENT
Start: 2024-12-24 | End: 2024-12-28 | Stop reason: HOSPADM

## 2024-12-24 RX ORDER — ENOXAPARIN SODIUM 100 MG/ML
40 INJECTION SUBCUTANEOUS DAILY
Status: DISCONTINUED | OUTPATIENT
Start: 2024-12-25 | End: 2024-12-28 | Stop reason: HOSPADM

## 2024-12-24 RX ORDER — ACETAMINOPHEN 325 MG/1
650 TABLET ORAL EVERY 6 HOURS PRN
Status: DISCONTINUED | OUTPATIENT
Start: 2024-12-24 | End: 2024-12-28 | Stop reason: HOSPADM

## 2024-12-24 RX ORDER — 0.9 % SODIUM CHLORIDE 0.9 %
1000 INTRAVENOUS SOLUTION INTRAVENOUS ONCE
Status: COMPLETED | OUTPATIENT
Start: 2024-12-24 | End: 2024-12-24

## 2024-12-24 RX ORDER — ONDANSETRON 4 MG/1
4 TABLET, ORALLY DISINTEGRATING ORAL EVERY 8 HOURS PRN
Status: DISCONTINUED | OUTPATIENT
Start: 2024-12-24 | End: 2024-12-28 | Stop reason: HOSPADM

## 2024-12-24 RX ORDER — SODIUM CHLORIDE 0.9 % (FLUSH) 0.9 %
5-40 SYRINGE (ML) INJECTION PRN
Status: DISCONTINUED | OUTPATIENT
Start: 2024-12-24 | End: 2024-12-28 | Stop reason: HOSPADM

## 2024-12-24 RX ORDER — SODIUM CHLORIDE 9 MG/ML
INJECTION, SOLUTION INTRAVENOUS PRN
Status: DISCONTINUED | OUTPATIENT
Start: 2024-12-24 | End: 2024-12-28 | Stop reason: HOSPADM

## 2024-12-24 RX ADMIN — SODIUM CHLORIDE 1000 ML: 9 INJECTION, SOLUTION INTRAVENOUS at 18:32

## 2024-12-24 ASSESSMENT — PAIN - FUNCTIONAL ASSESSMENT: PAIN_FUNCTIONAL_ASSESSMENT: NONE - DENIES PAIN

## 2024-12-25 PROBLEM — G93.40 ENCEPHALOPATHY: Status: ACTIVE | Noted: 2024-12-25

## 2024-12-25 LAB
ALBUMIN SERPL-MCNC: 3.2 G/DL (ref 3.5–5.2)
ALP SERPL-CCNC: 127 U/L (ref 35–104)
ALT SERPL-CCNC: 26 U/L (ref 0–32)
AMMONIA PLAS-SCNC: 34 UMOL/L (ref 11–51)
ANION GAP SERPL CALCULATED.3IONS-SCNC: 10 MMOL/L (ref 7–16)
AST SERPL-CCNC: 50 U/L (ref 0–31)
BASOPHILS # BLD: 0.03 K/UL (ref 0–0.2)
BASOPHILS NFR BLD: 1 % (ref 0–2)
BILIRUB SERPL-MCNC: 0.6 MG/DL (ref 0–1.2)
BUN SERPL-MCNC: 16 MG/DL (ref 6–23)
CALCIUM SERPL-MCNC: 8.7 MG/DL (ref 8.6–10.2)
CHLORIDE SERPL-SCNC: 103 MMOL/L (ref 98–107)
CO2 SERPL-SCNC: 24 MMOL/L (ref 22–29)
CREAT SERPL-MCNC: 0.7 MG/DL (ref 0.5–1)
EOSINOPHIL # BLD: 0.03 K/UL (ref 0.05–0.5)
EOSINOPHILS RELATIVE PERCENT: 1 % (ref 0–6)
ERYTHROCYTE [DISTWIDTH] IN BLOOD BY AUTOMATED COUNT: 14.6 % (ref 11.5–15)
FOLATE SERPL-MCNC: 5.4 NG/ML (ref 4.8–24.2)
GFR, ESTIMATED: 86 ML/MIN/1.73M2
GLUCOSE SERPL-MCNC: 93 MG/DL (ref 74–99)
HCT VFR BLD AUTO: 40 % (ref 34–48)
HGB BLD-MCNC: 12.8 G/DL (ref 11.5–15.5)
IMM GRANULOCYTES # BLD AUTO: 0.04 K/UL (ref 0–0.58)
IMM GRANULOCYTES NFR BLD: 1 % (ref 0–5)
LACTATE BLDV-SCNC: 0.9 MMOL/L (ref 0.5–1.9)
LYMPHOCYTES NFR BLD: 1.88 K/UL (ref 1.5–4)
LYMPHOCYTES RELATIVE PERCENT: 28 % (ref 20–42)
MCH RBC QN AUTO: 32.3 PG (ref 26–35)
MCHC RBC AUTO-ENTMCNC: 32 G/DL (ref 32–34.5)
MCV RBC AUTO: 101 FL (ref 80–99.9)
MONOCYTES NFR BLD: 1.12 K/UL (ref 0.1–0.95)
MONOCYTES NFR BLD: 17 % (ref 2–12)
NEUTROPHILS NFR BLD: 53 % (ref 43–80)
NEUTS SEG NFR BLD: 3.52 K/UL (ref 1.8–7.3)
PLATELET # BLD AUTO: 199 K/UL (ref 130–450)
PMV BLD AUTO: 9.6 FL (ref 7–12)
POTASSIUM SERPL-SCNC: 3.7 MMOL/L (ref 3.5–5)
PROT SERPL-MCNC: 7.9 G/DL (ref 6.4–8.3)
RBC # BLD AUTO: 3.96 M/UL (ref 3.5–5.5)
SODIUM SERPL-SCNC: 137 MMOL/L (ref 132–146)
TSH SERPL DL<=0.05 MIU/L-ACNC: 0.18 UIU/ML (ref 0.27–4.2)
VIT B12 SERPL-MCNC: 379 PG/ML (ref 211–946)
WBC OTHER # BLD: 6.6 K/UL (ref 4.5–11.5)

## 2024-12-25 PROCEDURE — 99232 SBSQ HOSP IP/OBS MODERATE 35: CPT | Performed by: INTERNAL MEDICINE

## 2024-12-25 PROCEDURE — 83605 ASSAY OF LACTIC ACID: CPT

## 2024-12-25 PROCEDURE — 2500000003 HC RX 250 WO HCPCS

## 2024-12-25 PROCEDURE — 6370000000 HC RX 637 (ALT 250 FOR IP): Performed by: INTERNAL MEDICINE

## 2024-12-25 PROCEDURE — 6360000002 HC RX W HCPCS

## 2024-12-25 PROCEDURE — 80053 COMPREHEN METABOLIC PANEL: CPT

## 2024-12-25 PROCEDURE — 99255 IP/OBS CONSLTJ NEW/EST HI 80: CPT | Performed by: PSYCHIATRY & NEUROLOGY

## 2024-12-25 PROCEDURE — 82140 ASSAY OF AMMONIA: CPT

## 2024-12-25 PROCEDURE — 6370000000 HC RX 637 (ALT 250 FOR IP)

## 2024-12-25 PROCEDURE — 85025 COMPLETE CBC W/AUTO DIFF WBC: CPT

## 2024-12-25 PROCEDURE — 82746 ASSAY OF FOLIC ACID SERUM: CPT

## 2024-12-25 PROCEDURE — 36415 COLL VENOUS BLD VENIPUNCTURE: CPT

## 2024-12-25 PROCEDURE — 2700000000 HC OXYGEN THERAPY PER DAY

## 2024-12-25 PROCEDURE — 84443 ASSAY THYROID STIM HORMONE: CPT

## 2024-12-25 PROCEDURE — 82607 VITAMIN B-12: CPT

## 2024-12-25 PROCEDURE — 2060000000 HC ICU INTERMEDIATE R&B

## 2024-12-25 RX ORDER — CLOPIDOGREL BISULFATE 75 MG/1
75 TABLET ORAL DAILY
Status: DISCONTINUED | OUTPATIENT
Start: 2024-12-25 | End: 2024-12-28 | Stop reason: HOSPADM

## 2024-12-25 RX ORDER — METOPROLOL SUCCINATE 25 MG/1
25 TABLET, EXTENDED RELEASE ORAL DAILY
Status: DISCONTINUED | OUTPATIENT
Start: 2024-12-25 | End: 2024-12-28 | Stop reason: HOSPADM

## 2024-12-25 RX ORDER — ATORVASTATIN CALCIUM 40 MG/1
40 TABLET, FILM COATED ORAL NIGHTLY
Status: DISCONTINUED | OUTPATIENT
Start: 2024-12-25 | End: 2024-12-28 | Stop reason: HOSPADM

## 2024-12-25 RX ADMIN — SODIUM CHLORIDE, PRESERVATIVE FREE 10 ML: 5 INJECTION INTRAVENOUS at 19:59

## 2024-12-25 RX ADMIN — SODIUM CHLORIDE, PRESERVATIVE FREE 10 ML: 5 INJECTION INTRAVENOUS at 01:49

## 2024-12-25 RX ADMIN — CLOPIDOGREL BISULFATE 75 MG: 75 TABLET ORAL at 08:34

## 2024-12-25 RX ADMIN — ENOXAPARIN SODIUM 40 MG: 100 INJECTION SUBCUTANEOUS at 08:33

## 2024-12-25 RX ADMIN — METOPROLOL SUCCINATE 25 MG: 25 TABLET, EXTENDED RELEASE ORAL at 08:33

## 2024-12-25 RX ADMIN — WATER 1000 MG: 1 INJECTION INTRAMUSCULAR; INTRAVENOUS; SUBCUTANEOUS at 01:49

## 2024-12-25 RX ADMIN — WATER 1000 MG: 1 INJECTION INTRAMUSCULAR; INTRAVENOUS; SUBCUTANEOUS at 21:57

## 2024-12-25 RX ADMIN — ATORVASTATIN CALCIUM 40 MG: 40 TABLET, FILM COATED ORAL at 19:59

## 2024-12-25 RX ADMIN — SODIUM CHLORIDE, PRESERVATIVE FREE 10 ML: 5 INJECTION INTRAVENOUS at 08:34

## 2024-12-25 NOTE — PROGRESS NOTES
Synopsis: Patient admitted on 12/24/2024     Ms. Sophie Awad, a 79 y.o. year old female  who  has a past medical history of Arthritis, Brain aneurysm, Hypertension, and Lupus (HCC).  Patient presented to emergency department via EMS for altered mental status.  Of note, patient had recent hospitalization with discharged just yesterday where she was being treated for UTI.  Allegedly son was not able to  antibiotic prescriptions for home and patient's symptoms worsened.  He then requested EMS to bring patient to [facility] main campus.  Patient allegedly confused at baseline but able to answer questions.  Hospital course thus far unremarkable.  Decision was made to admit patient to medicine for further management of altered mental status possibly secondary to urinary tract infection.    Past Medical History:   has a past medical history of Arthritis, Brain aneurysm, Hypertension, and Lupus (HCC).    Past Surgical History:   Past Surgical History:   Procedure Laterality Date    APPENDECTOMY      CEREBRAL ANEURYSM REPAIR      8     CHOLECYSTECTOMY  4/8/2016    cholelithias    INCONTINENCE SURGERY         Social History:   reports that she quit smoking about 38 years ago. She started smoking about 59 years ago. She has a 21 pack-year smoking history. She has never used smokeless tobacco. She reports that she does not drink alcohol and does not use drugs.     Family History:   Family History   Family history unknown: Yes       Subjective  Stable but dysarthric at baseline    Exam:  /85   Pulse 81   Temp 97.9 °F (36.6 °C) (Temporal)   Resp 23   Ht 1.676 m (5' 6\")   Wt 67.8 kg (149 lb 8 oz)   SpO2 91%   BMI 24.13 kg/m²   General appearance: No apparent distress, appears stated age and cooperative.  HEENT: Pupils equal, round, and reactive to light. Conjunctivae/corneas clear.  Neck: Trachea midline.  Respiratory:  Normal respiratory effort. Clear to auscultation, bilaterally  without Rales/Wheezes/Rhonchi.  Cardiovascular: Regular rate and rhythm with normal S1/S2 without murmurs, rubs or gallops.  Abdomen: Soft, non-tender, non-distended with normal bowel sounds.  Musculoskeletal: No clubbing trace edema  good lower extremity pulses (dorsalis pedis).   Skin:  No rashes    Neurologic: awake, alert and following commands     Medications:  Reviewed    Infusion Medications    sodium chloride       Scheduled Medications    clopidogrel  75 mg Oral Daily    metoprolol succinate  25 mg Oral Daily    sodium chloride flush  5-40 mL IntraVENous 2 times per day    enoxaparin  40 mg SubCUTAneous Daily    cefTRIAXone (ROCEPHIN) IV  1,000 mg IntraVENous Q24H     PRN Meds: sodium chloride flush, sodium chloride, ondansetron **OR** ondansetron, acetaminophen **OR** acetaminophen, polyethylene glycol    I/O    Intake/Output Summary (Last 24 hours) at 12/25/2024 1114  Last data filed at 12/24/2024 2110  Gross per 24 hour   Intake --   Output 250 ml   Net -250 ml       Labs:   Recent Labs     12/24/24  1842 12/25/24  0634   WBC 8.9 6.6   HGB 14.6 12.8   HCT 46.1 40.0    199       Recent Labs     12/24/24  1842 12/25/24  0634    137   K 4.4 3.7    103   CO2 24 24   BUN 20 16   CREATININE 1.1* 0.7   CALCIUM 9.8 8.7       Recent Labs     12/24/24  1842 12/25/24  0634   ALKPHOS 150* 127*   ALT 27 26   AST 55* 50*   BILITOT 0.8 0.6       No results for input(s): \"INR\" in the last 72 hours.    No results for input(s): \"CKTOTAL\", \"TROPONINI\" in the last 72 hours.    Chronic labs:  Lab Results   Component Value Date    CHOL 157 11/23/2020    TRIG 147 11/23/2020    HDL 43 11/23/2020    TSH 0.18 (L) 12/25/2024    INR 1.2 02/16/2021    LABA1C 5.9 (H) 11/23/2020       Microbiology:  Pending  No results for input(s): \"BC\" in the last 72 hours.  No results for input(s): \"ORG\" in the last 72 hours.  No results for input(s): \"BLOODCULT2\" in the last 72 hours.  No results for input(s): \"STREPNEUMAGU\" in

## 2024-12-25 NOTE — H&P
Hospitalist History & Physical      PCP: No, Pcp    Date of Service: Pt seen/examined on 12/24/2024     admitted to Inpatient with expected LOS greater than two midnights due to medical therapy.      Chief Complaint:  had concerns including Dysuria (Pt seen at [place] for UTI symptoms pt has not started on antibiotics. ).    History of Present Illness:    Ms. Sophie Awad, a 79 y.o. year old female  who  has a past medical history of Arthritis, Brain aneurysm, Hypertension, and Lupus (HCC).  Patient presented to emergency department via EMS for altered mental status.  Of note, patient had recent hospitalization with discharged just yesterday where she was being treated for UTI.  Allegedly son was not able to  antibiotic prescriptions for home and patient's symptoms worsened.  He then requested EMS to bring patient to [facility] main campus.  Patient allegedly confused at baseline but able to answer questions.  Hospital course thus far unremarkable.  Decision was made to admit patient to medicine for further management of altered mental status possibly secondary to urinary tract infection.    Past Medical History:        Diagnosis Date    Arthritis     Brain aneurysm     Hypertension     Lupus (HCC)        Past Surgical History:        Procedure Laterality Date    APPENDECTOMY      CEREBRAL ANEURYSM REPAIR      8     CHOLECYSTECTOMY  4/8/2016    cholelithias    INCONTINENCE SURGERY         Medications Prior to Admission:      Prior to Admission medications    Medication Sig Start Date End Date Taking? Authorizing Provider   metoprolol succinate (TOPROL XL) 25 MG extended release tablet Take 1 tablet by mouth daily 6/2/23   Ana Rosa Camilo, DO   clopidogrel (PLAVIX) 75 MG tablet Take 1 tablet by mouth daily 6/2/23   Ana Rosa Camilo, DO       Allergies:  Aspirin, Dye [iodides], and Penicillins    Social History:    RESIDENCE: Home  TOBACCO:   reports that she quit smoking about 38 years

## 2024-12-25 NOTE — ED NOTES
Report given to CORETTA Peters from 85.   Report method by phone   The following was reviewed with receiving RN:   Current vital signs:  /85   Pulse 82   Temp 99 °F (37.2 °C)   Resp 19   SpO2 98%                MEWS Score: 4     Any medication or safety alerts were reviewed. Any pending diagnostics and notifications were also reviewed, as well as any safety concerns or issues, abnormal labs, abnormal imaging, and abnormal assessment findings. Questions were answered.

## 2024-12-25 NOTE — PROGRESS NOTES
4 Eyes Skin Assessment     NAME:  Sophie Awad  YOB: 1945  MEDICAL RECORD NUMBER:  [id number]    The patient is being assessed for  Admission    I agree that at least one RN has performed a thorough Head to Toe Skin Assessment on the patient. ALL assessment sites listed below have been assessed.      Areas assessed by both nurses:    Head, Face, Ears, Shoulders, Back, Chest, Arms, Elbows, Hands, Sacrum. Buttock, Coccyx, Ischium, Legs. Feet and Heels, and Under Medical Devices         Does the Patient have a Wound? No noted wound(s)       Luigi Prevention initiated by RN: No  Wound Care Orders initiated by RN: No    Pressure Injury (Stage 3,4, Unstageable, DTI, NWPT, and Complex wounds) if present, place Wound referral order by RN under : No    New Ostomies, if present place, Ostomy referral order under : No     Nurse 1 eSignature: Electronically signed by Jose Alarcon RN on 12/25/24 at 1:42 AM EST    **SHARE this note so that the co-signing nurse can place an eSignature**    Nurse 2 eSignature: {Esignature:[id number]}

## 2024-12-25 NOTE — ED PROVIDER NOTES
Or   acetaminophen (TYLENOL) suppository 650 mg (has no administration in time range)   polyethylene glycol (GLYCOLAX) packet 17 g (has no administration in time range)   cefTRIAXone (ROCEPHIN) 1,000 mg in sterile water 10 mL IV syringe (has no administration in time range)   sodium chloride 0.9 % bolus 1,000 mL (0 mLs IntraVENous Stopped 12/24/24 2009)           Medical Decision Making/Differential Diagnosis:    CC/HPI Summary, Social Determinants of health, Records Reviewed, DDx, testing done/not done, ED Course, Reassessment, disposition considerations/shared decision making with patient, consults, disposition:            Patient presents emergency department for confusion and generalized weakness, was just discharged from [facility] yesterday for UTI and altered mental status.  Unsure what her baseline is and what p.o. antibiotic she supposed to continue at home.  Differential diagnose includes intracranial hemorrhage, UTI, electrolyte disturbance to know if you.  Labs show no leukocytosis or anemia.  Urine still shows evidence of urinary tract infection, will send for culture.  Patient is administered 1 g of Rocephin for antibiotic coverage.  Discussed over the phone with son, he is unsure what antibiotics she was getting at [facility] prior to discharge and does not know what antibiotic she supposed to be on at home.  She reports that she got worse after being home since being discharged last night, was unable to walk so called EMS for assistance.  CT of head shows no evidence of intracranial hemorrhage or mass effect.  Discussed case with her tomorrow accept the patient for mission under the hospitalist service.      CONSULTS: (Who and What was discussed)  IP CONSULT TO INTERNAL MEDICINE        FINAL IMPRESSION      1. Altered mental status, unspecified altered mental status type    2. Acute cystitis without hematuria          DISPOSITION/PLAN     DISPOSITION Admitted 12/24/2024 09:56:29  PM    DISPOSITION  Disposition: Admit to telemetry  Patient condition is stable    12/24/24, 7:01 PM EST.    Jaydon Han DO  Emergency Medicine    PATIENT REFERRED TO:  No follow-up provider specified.    DISCHARGE MEDICATIONS:  New Prescriptions    No medications on file       DISCONTINUED MEDICATIONS:  Discontinued Medications    No medications on file              (Please note that portions of this note were completed with a voice recognition program.  Efforts were made to edit the dictations but occasionally words are mis-transcribed.)    Jaydon Han DO (electronically signed)

## 2024-12-26 ENCOUNTER — APPOINTMENT (OUTPATIENT)
Dept: NEUROLOGY | Age: 79
DRG: 071 | End: 2024-12-26
Payer: MEDICARE

## 2024-12-26 PROBLEM — N39.0 COMPLICATED UTI (URINARY TRACT INFECTION): Status: RESOLVED | Noted: 2024-12-24 | Resolved: 2024-12-26

## 2024-12-26 PROBLEM — R82.71 BACTERIURIA WITH PYURIA: Status: ACTIVE | Noted: 2024-12-24

## 2024-12-26 PROBLEM — R82.81 BACTERIURIA WITH PYURIA: Status: ACTIVE | Noted: 2024-12-24

## 2024-12-26 PROBLEM — R41.82 ALTERED MENTAL STATUS: Status: ACTIVE | Noted: 2024-12-26

## 2024-12-26 LAB
MICROORGANISM SPEC CULT: NO GROWTH
SERVICE CMNT-IMP: NORMAL
SPECIMEN DESCRIPTION: NORMAL
T4 FREE SERPL-MCNC: 1.4 NG/DL (ref 0.9–1.7)

## 2024-12-26 PROCEDURE — 6370000000 HC RX 637 (ALT 250 FOR IP)

## 2024-12-26 PROCEDURE — 84439 ASSAY OF FREE THYROXINE: CPT

## 2024-12-26 PROCEDURE — 95819 EEG AWAKE AND ASLEEP: CPT

## 2024-12-26 PROCEDURE — 99232 SBSQ HOSP IP/OBS MODERATE 35: CPT | Performed by: INTERNAL MEDICINE

## 2024-12-26 PROCEDURE — 2500000003 HC RX 250 WO HCPCS

## 2024-12-26 PROCEDURE — 6360000002 HC RX W HCPCS

## 2024-12-26 PROCEDURE — 36415 COLL VENOUS BLD VENIPUNCTURE: CPT

## 2024-12-26 PROCEDURE — 99232 SBSQ HOSP IP/OBS MODERATE 35: CPT

## 2024-12-26 PROCEDURE — 95822 EEG COMA OR SLEEP ONLY: CPT

## 2024-12-26 PROCEDURE — 6370000000 HC RX 637 (ALT 250 FOR IP): Performed by: INTERNAL MEDICINE

## 2024-12-26 PROCEDURE — 95816 EEG AWAKE AND DROWSY: CPT | Performed by: PSYCHIATRY & NEUROLOGY

## 2024-12-26 PROCEDURE — 2700000000 HC OXYGEN THERAPY PER DAY

## 2024-12-26 PROCEDURE — 2060000000 HC ICU INTERMEDIATE R&B

## 2024-12-26 PROCEDURE — 92523 SPEECH SOUND LANG COMPREHEN: CPT

## 2024-12-26 RX ORDER — LANOLIN ALCOHOL/MO/W.PET/CERES
1000 CREAM (GRAM) TOPICAL DAILY
Status: DISCONTINUED | OUTPATIENT
Start: 2024-12-26 | End: 2024-12-28 | Stop reason: HOSPADM

## 2024-12-26 RX ADMIN — ENOXAPARIN SODIUM 40 MG: 100 INJECTION SUBCUTANEOUS at 08:56

## 2024-12-26 RX ADMIN — CYANOCOBALAMIN TAB 1000 MCG 1000 MCG: 1000 TAB at 12:53

## 2024-12-26 RX ADMIN — SODIUM CHLORIDE, PRESERVATIVE FREE 10 ML: 5 INJECTION INTRAVENOUS at 19:52

## 2024-12-26 RX ADMIN — SODIUM CHLORIDE, PRESERVATIVE FREE 10 ML: 5 INJECTION INTRAVENOUS at 08:57

## 2024-12-26 RX ADMIN — ATORVASTATIN CALCIUM 40 MG: 40 TABLET, FILM COATED ORAL at 19:52

## 2024-12-26 RX ADMIN — CLOPIDOGREL BISULFATE 75 MG: 75 TABLET ORAL at 08:56

## 2024-12-26 RX ADMIN — METOPROLOL SUCCINATE 25 MG: 25 TABLET, EXTENDED RELEASE ORAL at 08:56

## 2024-12-26 ASSESSMENT — PAIN SCALES - WONG BAKER
WONGBAKER_NUMERICALRESPONSE: NO HURT

## 2024-12-26 NOTE — CONSULTS
Rectal Q6H PRN Edson Hinds APRN - DEIRDRE        polyethylene glycol (GLYCOLAX) packet 17 g  17 g Oral Daily PRN Edson Hinds APRN - DEIRDRE        cefTRIAXone (ROCEPHIN) 1,000 mg in sterile water 10 mL IV syringe  1,000 mg IntraVENous Q24H Edson Hinds APRN - CNP            Allergies:      Allergies   Allergen Reactions    Aspirin      Breaks out    Dye [Iodides]      Ct scan dye    Penicillins Hives        Physical Examination  Vitals   Vitals:    12/25/24 0833 12/25/24 1124 12/25/24 1513 12/25/24 1859   BP: 132/85 (!) 142/86 125/73 (!) 143/89   Pulse: 81 91 85 83   Resp:  17 16 16   Temp:  97.6 °F (36.4 °C) 97.7 °F (36.5 °C) 98.5 °F (36.9 °C)   TempSrc:  Temporal Temporal Oral   SpO2:  93% 92% 92%   Weight:       Height:            General: Patient appears in no acute distress.   HEENT: Normocephalic, atraumatic  Chest: no dyspnea  Heart: RRR  Extremities/Peripheral vascular: No edema/swelling noted.     Neurologic Examination    Mental Status  Patient appears attentive and is following commands.  However, she has expressive aphasia with difficulty in naming and repetition.  She is able to state her name and the current month, but speech is fragmented.    Cranial Nerves  Pupils equal and reactive to light, visual acuity and fields appear normal for age, extraocular movements intact without nystagmus.  Face is symmetric.  There is no dysarthria.    Motor  No focal motor deficits, no drift in upper or lower extremities.  Normal resting tone.  No abnormal movements.  DTR 2+ and symmetric at the patella.    Sensation  No focal sensory deficits to light touch or vibration.    Reflexes  No pathological reflexes, toes mute.    Labs  Recent Labs     12/25/24  0634      K 3.7      CO2 24   BUN 16   CREATININE 0.7   GLUCOSE 93   CALCIUM 8.7   BILITOT 0.6   ALKPHOS 127*   AST 50*   ALT 26   WBC 6.6   RBC 3.96   HGB 12.8   HCT 40.0   .0*   MCH 32.3   MCHC 32.0   RDW 14.6      MPV 9.6

## 2024-12-26 NOTE — PROGRESS NOTES
Message sent to Dr Kerr via perfect serve at family's request for a call from physician. Phone number for son, Joe, added to message for doctor's convenience.

## 2024-12-26 NOTE — ACP (ADVANCE CARE PLANNING)
Advance Care Planning   Healthcare Decision Maker:    Primary Decision Maker: Tim Awad [facility] - [phone]    Click here to complete Healthcare Decision Makers including selection of the Healthcare Decision Maker Relationship (ie \"Primary\").

## 2024-12-26 NOTE — PROGRESS NOTES
Sophie Awad is a 79 y.o. right handed female     Neurology following for encephalopathy, concern for stroke    PMH of arthritis, brain aneurysm s/p aneurysm clipping, hypertension, and lupus    Assessment:     Encephalopathy, aphasia, concern for stroke.   Patient with recent UTI  Patient presented with confusion and aphasia  Unable to have MRI brain  EEG pending    Plan:     Unable to have MRI brain due to aneurysm clipping  Unable to have CTAs due to allergy to CT dye  EEG  SLP eval  Continue to treat UTI  Start B12 1000 mcg daily  Neurology will follow    History of Present Illness:     Patient presented to the ER on 12/24/2024 after experiencing confusion at home.  She was recently discharged from [facility] after experiencing a UTI.  She was discharged home on antibiotics, however her son was unable to  the prescriptions.  She was unable to ambulate and they decided to bring her to the ER for evaluation.    Her CT head was negative for abnormalities    Neurology was consulted for confusion    Subjective:     Patient sitting up in bed awake and alert, she is oriented to person only.  She becomes frustrated because she is unable to answer person place and time.  She has difficulty reading from the stroke scale bulk.    She is able to follow most of my commands but has difficulty with two-step commands    There is no family at the bedside    Objective:       /87   Pulse 72   Temp 98 °F (36.7 °C) (Temporal)   Resp 17   Ht 1.676 m (5' 6\")   Wt 67.8 kg (149 lb 8 oz)   SpO2 98%   BMI 24.13 kg/m²       General appearance: alert, appears stated age, cooperative and no distress  Head: normocephalic, without obvious abnormality, atraumatic  Eyes: conjunctivae/corneas clear  Neck: no adenopathy,  supple, symmetrical, trachea midline and thyroid not enlarged, symmetric, no tenderness/mass/nodules  Lungs: Regular respirations on room air  Heart: No chest pain or palpitations  Abdomen:  soft, non-tender; bowel sounds normal; no masses,  no organomegaly  Extremities:  normal, atraumatic, no cyanosis or edema  Pulses: 2+ and symmetric  Skin: color, texture, turgor normal---no rashes or lesions      Mental Status: alert and oriented to person only    Appropriate attention/concentration  Intact fundus of knowledge  Memories  impaired    Speech: no dysarthria  Language: Expressive aphasia, speech is fragmented      Cranial Nerves:  I: smell    II: visual acuity     II: visual fields Full to confrontation, right eye exotropia   II: pupils MARCOS   III,VII: ptosis None   III,IV,VI: extraocular muscles  Full ROM   V: mastication    V: facial light touch sensation  Normal   V,VII: corneal reflex     VII: facial muscle function - upper  Normal   VII: facial muscle function - lower Normal   VIII: hearing Normal   IX: soft palate elevation     IX,X: gag reflex    XI: trapezius strength  5/5   XI: sternocleidomastoid strength    XI: neck extension strength     XII: tongue strength  Normal     Motor:  5/5 throughout  Normal bulk and tone  No drift   No abnormal movements    Sensory:  LT and PP normal  Vibration normal    Coordination:   No resting tremors observed    Gait:  Deferred for patient s/p fall consideration    DTR:   2+ throughout    No Babinskis  No Leal's    No other pathological reflexes    Laboratory/Radiology:     CBC with Differential:    Lab Results   Component Value Date/Time    WBC 6.6 12/25/2024 06:34 AM    RBC 3.96 12/25/2024 06:34 AM    HGB 12.8 12/25/2024 06:34 AM    HCT 40.0 12/25/2024 06:34 AM     12/25/2024 06:34 AM    .0 12/25/2024 06:34 AM    MCH 32.3 12/25/2024 06:34 AM    MCHC 32.0 12/25/2024 06:34 AM    RDW 14.6 12/25/2024 06:34 AM    BANDSPCT 1 06/22/2015 06:17 AM    METASPCT 2 06/22/2015 06:17 AM    LYMPHOPCT 28 12/25/2024 06:34 AM    MONOPCT 17 12/25/2024 06:34 AM    MYELOPCT 1 06/22/2015 06:17 AM    EOSPCT 1 12/25/2024 06:34 AM    BASOPCT 1 12/25/2024 06:34 AM

## 2024-12-26 NOTE — CARE COORDINATION
Pt has a hx of dementia. She lives at home alone in a apt on the 1st floor with no steps. Her one so, Tim, said he is in a marlee for a couple of months and to call his brother, cesilia. I called cesilia to complete the assessment. Pt has no advance directives. She no longer drives.she has been independent with bathing and dressing but son said that he is starting to look in took help. I told him about direction home and to call them. I left the information on the home going instructions. Sons provide dinner and pt completes simple meals. Cesilia said he calls several times a day to remind her about her medications. No hhc pta. Cesilia said the plan is home and therapy is not needed to see pt here. The pcp is waiting for AVINASH to be read and anticipates home tomorrow. Cesilia will come and get pt. No dme pta. ORESTES Howard  12/26/2024    Case Management Assessment  Initial Evaluation    Date/Time of Evaluation: 12/26/2024 1:05 PM  Assessment Completed by: ORESTES Howard    If patient is discharged prior to next notation, then this note serves as note for discharge by case management.    Patient Name: Sophie Awad                   YOB: 1945  Diagnosis: Acute cystitis without hematuria [N30.00]  Complicated UTI (urinary tract infection) [N39.0]  Altered mental status, unspecified altered mental status type [R41.82]                   Date / Time: 12/24/2024  6:06 PM    Patient Admission Status: Inpatient   Readmission Risk (Low < 19, Mod (19-27), High > 27): Readmission Risk Score: 12.3    Current PCP: No, Pcp  PCP verified by CM? Yes    Chart Reviewed: Yes      History Provided by: Child/Family  Patient Orientation: Person    Patient Cognition: Dementia / Early Alzheimer's    Hospitalization in the last 30 days (Readmission):  No    If yes, Readmission Assessment in  Navigator will be completed.    Advance Directives:      Code Status: Full Code   Patient's Primary Decision Maker is: Legal Next of Kin     with a choice of provider and agrees with the discharge plan. Freedom of choice list with basic dialogue that supports the patient's individualized plan of care/goals and shares the quality data associated with the providers was provided to:     Patient Representative Name:       The Patient and/or Patient Representative Agree with the Discharge Plan?      [facility]  Case Management Department  Ph: [phone] Fax: [phone]

## 2024-12-26 NOTE — PROGRESS NOTES
SPEECH/LANGUAGE PATHOLOGY  SPEECH/LANGUAGE/COGNITIVE EVALUATION   and PLAN OF CARE      PATIENT NAME:  Sophie Awad  (female)     MRN:  [id number]    :  1945  (79 y.o.)  STATUS:  Inpatient: Room 8503/8503-A    TODAY'S DATE:  2024  ORDER DATE, DESCRIPTION AND REFERRING PROVIDER :    SLP eval and treat  Start:  24,   End:  24,   ONE TIME,   Standing Count:  1 Occurrences,   R       Theresa Mcclain, APRN - CNP  REASON FOR REFERRAL:  Expressive Aphasia  EVALUATING THERAPIST: MAY Roy    ADMITTING DIAGNOSIS: Acute cystitis without hematuria [N30.00]  Complicated UTI (urinary tract infection) [N39.0]  Altered mental status, unspecified altered mental status type [R41.82]    VISIT DIAGNOSIS:   Visit Diagnoses         Codes    Altered mental status, unspecified altered mental status type    -  Primary R41.82               SPEECH THERAPY  PLAN OF CARE   The speech therapy  POC is established based on physician order, speech pathology diagnosis and results of clinical assessment     SPEECH PATHOLOGY DIAGNOSIS:    Moderate Receptive/Expressive Aphasia, Moderate-Severe Cognitive Impairment    Speech Pathology intervention is recommended 3-6 times per week for LOS or when goals are met with emphasis on the following:      Conditions Requiring Skilled Therapeutic Intervention for speech, language and/or cognition    Receptive Aphasia  Expressive Aphasia   Anomia  Cognitive linguistic impairment  Decreased safety awareness  Decreased short term memory  Decreased problem solving skills   Decreased thought organization    Specific Speech Therapy Interventions to Include:   Confrontational Naming task training   Therapeutic tasks for paraphasic errors  Receptive language training   Expressive language training   Therapeutic tasks for Cognition    Specific instructions for next treatment:     To initiate POC  To initiate therapeutic exercises  To initiate language tasks  To initiate  errors, and Cueing was required                  EDUCATION:   The Speech Language Pathologist (SLP) completed education regarding results of evaluation and that intervention is warranted at this time.  Learner: Patient  Education: Reviewed results and recommendations of this evaluation  Evaluation of Education:  Demonstrates with assistance    Evaluation Time includes thorough review of current medical information, gathering information on past medical history/social history and prior level of function, completion of standardized testing/informal observation of tasks, assessment of data and education on plan of care and goals.      CPT code:    65283  eval speech sound lang comprehension      The admitting diagnosis and active problem list, as listed below have been reviewed prior to initiation of this evaluation.        ACTIVE PROBLEM LIST:   Patient Active Problem List   Diagnosis    Hypokalemia    Hypertension    Lupus    Acute cystitis with hematuria    Lumbar radiculopathy    History of cerebral aneurysm repair    Mild dementia (HCC)    Anomalies of cerebrovascular system, congenital    Acute metabolic encephalopathy    Aphasia    Sepsis due to urinary tract infection (HCC)    Acute cystitis without hematuria    Unable to ambulate    Left hip pain    Bacteriuria with pyuria    Encephalopathy       Oni Sykes M.S., CCC-SLP/L   Speech-Language Pathologist  [id number]

## 2024-12-26 NOTE — PROGRESS NOTES
without Rales/Wheezes/Rhonchi.  Cardiovascular: Regular rate and rhythm with normal S1/S2 without murmurs, rubs or gallops.  Abdomen: Soft, non-tender, non-distended with normal bowel sounds.  Musculoskeletal: No clubbing trace edema  good lower extremity pulses (dorsalis pedis).   Skin:  No rashes    Neurologic: awake, alert and following commands     Medications:  Reviewed    Infusion Medications    sodium chloride       Scheduled Medications    vitamin B-12  1,000 mcg Oral Daily    clopidogrel  75 mg Oral Daily    metoprolol succinate  25 mg Oral Daily    atorvastatin  40 mg Oral Nightly    sodium chloride flush  5-40 mL IntraVENous 2 times per day    enoxaparin  40 mg SubCUTAneous Daily    cefTRIAXone (ROCEPHIN) IV  1,000 mg IntraVENous Q24H     PRN Meds: sodium chloride flush, sodium chloride, ondansetron **OR** ondansetron, acetaminophen **OR** acetaminophen, polyethylene glycol    I/O  No intake or output data in the 24 hours ending 12/26/24 0916      Labs:   Recent Labs     12/24/24  1842 12/25/24  0634   WBC 8.9 6.6   HGB 14.6 12.8   HCT 46.1 40.0    199       Recent Labs     12/24/24  1842 12/25/24  0634    137   K 4.4 3.7    103   CO2 24 24   BUN 20 16   CREATININE 1.1* 0.7   CALCIUM 9.8 8.7       Recent Labs     12/24/24  1842 12/25/24  0634   ALKPHOS 150* 127*   ALT 27 26   AST 55* 50*   BILITOT 0.8 0.6       No results for input(s): \"INR\" in the last 72 hours.    No results for input(s): \"CKTOTAL\", \"TROPONINI\" in the last 72 hours.    Chronic labs:  Lab Results   Component Value Date    CHOL 157 11/23/2020    TRIG 147 11/23/2020    HDL 43 11/23/2020    TSH 0.18 (L) 12/25/2024    INR 1.2 02/16/2021    LABA1C 5.9 (H) 11/23/2020       Microbiology:  Pending  No results for input(s): \"BC\" in the last 72 hours.  No results for input(s): \"ORG\" in the last 72 hours.  No results for input(s): \"BLOODCULT2\" in the last 72 hours.  No results for input(s): \"STREPNEUMAGU\" in the last 72  hours.  No results for input(s): \"LP1UAG\" in the last 72 hours.  No results for input(s): \"ASO\" in the last 72 hours.  No results for input(s): \"CULTRESP\" in the last 72 hours.  Recent Labs     12/24/24 1842   PROCAL 0.11*       Radiology:  CT Head W/O Contrast    Result Date: 12/24/2024  1. No acute intracranial findings. 2. Signs of previous aneurysm clipping and coiling. 3. Encephalomalacia involving the left frontal and temporal lobes.     XR CHEST PORTABLE    Result Date: 12/24/2024  No acute process.       ASSESSMENT:    Principal Problem:    Complicated UTI (urinary tract infection)  Active Problems:    Aphasia    Encephalopathy  Resolved Problems:    * No resolved hospital problems. 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  Urine culture negative from 12/24   CAD maintaining antiplatelet Plavix.  Controlling hypertension the same and she is able to swallow her medications.  Metabolic encephalopathy unable to do MRI brain due to previous aneurysm with coil and clip  EEG ordered        Diet: ADULT DIET; Regular; 5 carb choices (75 gm/meal)  Code Status: Full Code  PT/OT Eval Status:   order  DVT Prophylaxis:   in place  Recommended disposition at discharge: Anticipate home with home health care tomorrow    +++++++++++++++++++++++++++++++++++++++++++++++++  Yusef Kerr MD   [facility].  +++++++++++++++++++++++++++++++++++++++++++++++++  NOTE: This report was transcribed using voice recognition software. Every effort was made to ensure accuracy; however, inadvertent computerized transcription errors may be present.

## 2024-12-26 NOTE — PROGRESS NOTES
Spoke to RN this morning- per patients family and previous MRIs cancelled due to patients past history of aneurysm clipping she cannot have MRI. RN stated Dr. Pak made aware and to d/c the order.

## 2024-12-27 ENCOUNTER — APPOINTMENT (OUTPATIENT)
Dept: CT IMAGING | Age: 79
DRG: 071 | End: 2024-12-27
Payer: MEDICARE

## 2024-12-27 ENCOUNTER — APPOINTMENT (OUTPATIENT)
Dept: ULTRASOUND IMAGING | Age: 79
DRG: 071 | End: 2024-12-27
Payer: MEDICARE

## 2024-12-27 PROCEDURE — 99232 SBSQ HOSP IP/OBS MODERATE 35: CPT

## 2024-12-27 PROCEDURE — 6370000000 HC RX 637 (ALT 250 FOR IP)

## 2024-12-27 PROCEDURE — 97530 THERAPEUTIC ACTIVITIES: CPT

## 2024-12-27 PROCEDURE — 2700000000 HC OXYGEN THERAPY PER DAY

## 2024-12-27 PROCEDURE — 2500000003 HC RX 250 WO HCPCS

## 2024-12-27 PROCEDURE — 97161 PT EVAL LOW COMPLEX 20 MIN: CPT

## 2024-12-27 PROCEDURE — 80061 LIPID PANEL: CPT

## 2024-12-27 PROCEDURE — 93880 EXTRACRANIAL BILAT STUDY: CPT

## 2024-12-27 PROCEDURE — 99232 SBSQ HOSP IP/OBS MODERATE 35: CPT | Performed by: INTERNAL MEDICINE

## 2024-12-27 PROCEDURE — 6360000002 HC RX W HCPCS

## 2024-12-27 PROCEDURE — 70450 CT HEAD/BRAIN W/O DYE: CPT

## 2024-12-27 PROCEDURE — 97166 OT EVAL MOD COMPLEX 45 MIN: CPT

## 2024-12-27 PROCEDURE — 97535 SELF CARE MNGMENT TRAINING: CPT

## 2024-12-27 PROCEDURE — 2060000000 HC ICU INTERMEDIATE R&B

## 2024-12-27 PROCEDURE — 6370000000 HC RX 637 (ALT 250 FOR IP): Performed by: INTERNAL MEDICINE

## 2024-12-27 RX ADMIN — CYANOCOBALAMIN TAB 1000 MCG 1000 MCG: 1000 TAB at 08:57

## 2024-12-27 RX ADMIN — SODIUM CHLORIDE, PRESERVATIVE FREE 10 ML: 5 INJECTION INTRAVENOUS at 19:32

## 2024-12-27 RX ADMIN — ENOXAPARIN SODIUM 40 MG: 100 INJECTION SUBCUTANEOUS at 08:56

## 2024-12-27 RX ADMIN — METOPROLOL SUCCINATE 25 MG: 25 TABLET, EXTENDED RELEASE ORAL at 08:57

## 2024-12-27 RX ADMIN — ATORVASTATIN CALCIUM 40 MG: 40 TABLET, FILM COATED ORAL at 19:32

## 2024-12-27 RX ADMIN — CLOPIDOGREL BISULFATE 75 MG: 75 TABLET ORAL at 08:57

## 2024-12-27 RX ADMIN — SODIUM CHLORIDE, PRESERVATIVE FREE 10 ML: 5 INJECTION INTRAVENOUS at 08:56

## 2024-12-27 NOTE — PLAN OF CARE
Problem: Discharge Planning  Goal: Discharge to home or other facility with appropriate resources  Outcome: Progressing     Problem: Safety - Adult  Goal: Free from fall injury  Outcome: Progressing     Problem: Skin/Tissue Integrity  Goal: Absence of new skin breakdown  Description: 1.  Monitor for areas of redness and/or skin breakdown  2.  Assess vascular access sites hourly  3.  Every 4-6 hours minimum:  Change oxygen saturation probe site  4.  Every 4-6 hours:  If on nasal continuous positive airway pressure, respiratory therapy assess nares and determine need for appliance change or resting period.  Outcome: Progressing     Problem: Pain  Goal: Verbalizes/displays adequate comfort level or baseline comfort level  Outcome: Progressing     Problem: Nutrition Deficit:  Goal: Optimize nutritional status  Outcome: Progressing     Problem: Neurosensory - Adult  Goal: Achieves stable or improved neurological status  Outcome: Progressing  Goal: Absence of seizures  Outcome: Progressing  Goal: Remains free of injury related to seizures activity  Outcome: Progressing  Goal: Achieves maximal functionality and self care  Outcome: Progressing     Problem: Skin/Tissue Integrity - Adult  Goal: Skin integrity remains intact  Outcome: Progressing  Goal: Incisions, wounds, or drain sites healing without S/S of infection  Outcome: Progressing  Goal: Oral mucous membranes remain intact  Outcome: Progressing     Problem: Musculoskeletal - Adult  Goal: Return mobility to safest level of function  Outcome: Progressing  Goal: Maintain proper alignment of affected body part  Outcome: Progressing  Goal: Return ADL status to a safe level of function  Outcome: Progressing     Problem: Infection - Adult  Goal: Absence of infection at discharge  Outcome: Progressing  Goal: Absence of infection during hospitalization  Outcome: Progressing  Goal: Absence of fever/infection during anticipated neutropenic period  Outcome: Progressing

## 2024-12-27 NOTE — PROGRESS NOTES
Physical Therapy  Physical Therapy Initial Assessment       Name: Sophie Awad  : 1945  MRN: [id number]      Date of Service: 2024    Evaluating PT:  Mana Dominickaiden PT, DPT [id number]    Room #:  8503/8503-A  Diagnosis:  Acute cystitis without hematuria [N30.00]  Complicated UTI (urinary tract infection) [N39.0]  Altered mental status, unspecified altered mental status type [R41.82]  PMHx/PSHx:   has a past medical history of Arthritis, Brain aneurysm, Hypertension, and Lupus (HCC).    Procedure/Surgery:  none this admission  Precautions: Falls,  aphasia, cognition, +alarms, O2, purewick     SUBJECTIVE:    Pt lives alone in a first floor apartment. Per chart, pt ambulated with no AD PTA. Son was assisting with medication management and meals.     Equipment Owned: none  Equipment Needs:  TBD    OBJECTIVE:   Initial Evaluation  Date: 24 Treatment  Date:  Short Term/ Long Term   Goals   AM-PAC 6 Clicks 15/24     Was pt agreeable to Eval/treatment? Yes      Does pt have pain? No complaints      Bed Mobility  Rolling: Kenna  Supine to sit: Kenna  Sit to supine: NT  Scooting: Kenna  Rolling: Independent  Supine to sit: Independent  Sit to supine: Independent  Scooting: Independent   Transfers Sit to stand: Kenna  Stand to sit: Kenna  Stand pivot: ModA no AD HHA  Sit to stand: SBA  Stand to sit: SBA  Stand pivot: SBA AAD   Ambulation    15 feet with WW Kenna   *cues for safety and appropriate WW use   >300 feet with AAD SBA   Stair negotiation: ascended and descended  NT d/t safety   12 steps with 1 rail(s) Kenna   ROM BUE:  Defer to OT  BLE:  WFL     Strength BUE:  Defer to OT  BLE:  4+/5   Improve 1 MMT   Balance Sitting EOB:  SBA  Dynamic Standing:  Min/ModA with HHA and WW  Sitting EOB:  Independent  Dynamic Standing:  SBA AAD     Pt is A & O x 2-3 with options. Pt follows commands. Pt limited by apahsia.   Sensation: no abnormalities noted   Edema:  unremarkable     Vitals:    HR and SPO2% WNL on 2L O2 via NC.  Treatment Time  15 minutes     Evaluation Time includes thorough review of current medical information, gathering information on past medical history/social history and prior level of function, completion of standardized testing/informal observation of tasks, assessment of data and education on plan of care and goals.    CPT codes:  [x] Low Complexity PT evaluation 25803  [] Moderate Complexity PT evaluation 44698  [] High Complexity PT evaluation 30151  [] PT Re-evaluation 26315  [] Gait training 85915 0 minutes  [] Manual therapy 07952 0 minutes  [x] Therapeutic activities 59720 15 minutes  [] Therapeutic exercises 75373 0 minutes  [] Neuromuscular reeducation 91548 0 minutes       Mana Torres PT, DPT  [id number]

## 2024-12-27 NOTE — PROCEDURES
PROCEDURE NOTE  Date: 12/26/2024   Name: Sophie Awad  YOB: 1945    Procedures:    Method:   This recording was done using 16 channel of EEG recording and 1 channel of EKG recording. It captures periods of wakefulness and drowsiness. Photic stimulation is performed as activation procedure. Hyperventilation is not performed.       Interpretation:   There is a posterior dominant rhythm of 8 Hz alpha activity. There is moderate amount of fronto central Beta activity seen in right hemisphere. There is evidence of intermittent slowing seen at 1-2 Hz delta frequency affecting left frontotemporal F7/T3 channels with secondary spread to right frontal channels.   There were no epileptiform abnormalities or electrographic seizures in this recording. Photic stimulation does not elicit a occipital driving response.     EKG demonstrates a regular rhythm with rate of 72 bpm    Summary: This is an abnormal Electroencephalogram with intermittent focal slowing affecting left frontotemporal lobe with secondary spread to right frontal lobe secondary to cortical and subcortical dysfunction involving the left frontotemporal lobe. There are no epileptiform abnormality or electrographic seizures in this recording.        Melida Lockett MD

## 2024-12-27 NOTE — PROGRESS NOTES
correct breathing pattern and techniques to improve independence/tolerance for self-care routine  * Functional transfer/mobility training/DME recommendations for increased independence, safety, and fall prevention  * Patient/Family education to increase follow through with safety techniques and functional independence  * Recommendation of environmental modifications for increased safety with functional transfers/mobility and ADLs  * Therapeutic exercise to improve motor endurance, ROM, and functional strength for ADLs/functional transfers  * Therapeutic activities to facilitate/challenge dynamic balance, stand tolerance for increased safety and independence with ADLs  * Positioning to improve skin integrity, interaction with environment and functional independence    Recommended Adaptive Equipment: TBD      Pt is a poor historian, unable to answer PLOF questions this date. Info below obtained from chart:   Home Living:  Pt lives alone  in a 1 level apt on the 1 floor with 0 stairs to enter    Bedroom setup: main level    Bathroom setup: main level  unknown shower set up 2/2 aphasia    Equipment owned:   no AD per chart     Prior Level of Function:  Pt I with ADLs , A with IADLs (son checks on pt and prepares meals), and completed functional mobility with no AD  Falls: unknown   Driving: no   Occupation/leisure: did not state     Pleasant and cooperative throughout session   Liable      Pain Level: no s/s of pain  Location: n/a  OT provided: n/a    Cognition: A&O: 2-3/4; with multiple choice    Follows single step directions: Fair   Memory: Fair   Sequencing: Fair   Problem solving: Fair -   Judgement/safety: Fair -   Attention:  Fair     Functional Assessment: AM-PAC Inpatient Daily Activity Raw Score: 15 /24     Initial Eval Status  Date: 12/27/2024   Treatment Status  Date: STG=LTG  Time frame: 10-14 days   Feeding Set up    Mod I    Grooming Min A     Mod I    UB Dressing Min A     Set up   LB Dressing Mod A  includes:  OT edu pt/family on role of OT in the acute care setting. Pt  verbalized understanding   Therapist facilitated and instructed pt on adapted techniques & compensatory strategies to improve safety and independence with ADLs, bed mobility , functional transfers, and functional mobility as noted above to allow pt to achieve highest level of independence and safely. Pt provided  min cuing , verbal instructions , extended time , and encouragement  in order to promote maximum level of independence.   Pt edu on use of call light and waiting until staff present to attempt any functional mobility. Pt verbalized understanding and demonstrated ability to locate and press call button.     Rehab Potential: Good  for established goals     Patient / Family Goal: to get better     Patient and/or family were instructed on functional diagnosis, prognosis/goals and OT plan of care. Pt/family demonstrated understanding.      Eval Complexity:      Description  Performance deficits  Clinical decision making  Co-morbidities affecting occupational performance  Modification or assistance to complete eval    Low Complexity   1 to 3 []  Low []  None []  None []   Moderate Complexity   3 to 5 [x]  Mod []  Maybe []  Min to Mod [x]   High Complexity   5 or more []  High [x]  Yes [x]  Max []     The above evaluation is classified as moderate  complexity based off the noted performance deficits, personal factors, co-morbidities, assistance required, and other factors as noted in the clinical evaluation and functional testing.     Time In: 1150  Time Out: 1215  Total Treatment Time: 10 minutes    Min Units   OT Eval Low 22504       OT Eval Moderate 97166  x 1   OT Eval High 02697       OT Re-Eval 23357       Therapeutic Ex 11229       Therapeutic Activities 14029      ADL/Self Care 49651  10 1   Orthotic Management 97159       Neuro Re-Ed 83021       Non-Billable Time          Evaluation Time includes thorough review of current medical

## 2024-12-27 NOTE — PROGRESS NOTES
Synopsis: Patient admitted on 12/24/2024     Ms. Sophie Awad, a 79 y.o. year old female  who  has a past medical history of Arthritis, Brain aneurysm, Hypertension, and Lupus (HCC).  Patient presented to emergency department via EMS for altered mental status.  Of note, patient had recent hospitalization with discharged just yesterday where she was being treated for UTI.  Allegedly son was not able to  antibiotic prescriptions for home and patient's symptoms worsened.  He then requested EMS to bring patient to [facility] main campus.  Patient allegedly confused at baseline but able to answer questions.  Hospital course thus far unremarkable.  Decision was made to admit patient to medicine for further management of altered mental status possibly secondary to urinary tract infection.    Past Medical History:   has a past medical history of Arthritis, Brain aneurysm, Hypertension, and Lupus (HCC).    Past Surgical History:   Past Surgical History:   Procedure Laterality Date    APPENDECTOMY      CEREBRAL ANEURYSM REPAIR      8     CHOLECYSTECTOMY  4/8/2016    cholelithias    INCONTINENCE SURGERY         Social History:   reports that she quit smoking about 38 years ago. She started smoking about 59 years ago. She has a 21 pack-year smoking history. She has never used smokeless tobacco. She reports that she does not drink alcohol and does not use drugs.     Family History:   Family History   Family history unknown: Yes       Subjective  Stable but dysarthric at baseline    Exam:  /80   Pulse 80   Temp 97.1 °F (36.2 °C) (Temporal)   Resp 18   Ht 1.676 m (5' 6\")   Wt 68 kg (150 lb)   SpO2 95%   BMI 24.21 kg/m²   General appearance: No apparent distress, appears stated age and cooperative.  HEENT: Pupils equal, round, and reactive to light. Conjunctivae/corneas clear.  Neck: Trachea midline.  Respiratory:  Normal respiratory effort. Clear to auscultation, bilaterally without

## 2024-12-27 NOTE — DISCHARGE INSTR - COC
Continuity of Care Form    Patient Name: Sophie Awad   :  1945  MRN:  [id number]    Admit date:  2024  Discharge date:      Code Status Order: Full Code   Advance Directives:   Advance Care Flowsheet Documentation             Admitting Physician:  José Antonio Fox MD  PCP: Michelle, Pcp    Discharging Nurse: [facility]/Room[facility] 8503/8503-A  Discharging Unit Phone Number: [phone]    Emergency Contact:   Extended Emergency Contact Information  Primary Emergency Contact: DelmiTim mustafa  Address: [address] [place] [address]  Home Phone: [phone]  Mobile Phone: [phone]  Relation: Child  Secondary Emergency Contact: Joe Cruz  Home Phone: [phone]  Mobile Phone: [phone]  Relation: Child    Past Surgical History:  Past Surgical History:   Procedure Laterality Date    APPENDECTOMY      CEREBRAL ANEURYSM REPAIR      6     CHOLECYSTECTOMY  2016    cholelithias    INCONTINENCE SURGERY         Immunization History:   Immunization History   Administered Date(s) Administered    COVID-19, PFIZER PURPLE top, DILUTE for use, (age 12 y+), 30mcg/0.3mL 2021, 2021    Influenza Vaccine, unspecified formulation 10/15/2013    Influenza Virus Vaccine 02/15/2016    Influenza, FLUAD, (age 65 y+), IM, Quadv, 0.5mL 2020, 2021    Influenza, FLUAD, (age 65 y+), IM, Trivalent PF, 0.5mL 2019    Influenza, FLUZONE High Dose, (age 65 y+), IM, Trivalent PF, 0.5mL 2017    PPD Test 2021    Pneumococcal, PPSV23, PNEUMOVAX 23, (age 2y+), SC/IM, 0.5mL 2017       Active Problems:  Patient Active Problem List   Diagnosis Code    Hypokalemia E87.6    Hypertension I10    Lupus IRE0639    Acute cystitis with hematuria N30.01    Lumbar radiculopathy M54.16    History of cerebral aneurysm repair Z98.890, Z86.79    Mild dementia (HCC) F03.A0    Anomalies of cerebrovascular system, 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  Ventilator:    - No ventilator support    Rehab Therapies: Physical Therapy, Occupational Therapy, and Speech/Language Therapy  Weight Bearing Status/Restrictions: No weight bearing restrictions  Other Medical Equipment (for information only, NOT a DME order):  NA  Other Treatments: NA    Patient's personal belongings (please select all that are sent with patient):  Glasses    RN SIGNATURE:  Electronically signed by Jeana Guevara RN on 12/28/24 at 8:44 AM EST    CASE MANAGEMENT/SOCIAL WORK SECTION    Inpatient Status Date: ***    Readmission Risk Assessment Score:  [facility] RISK OF UNPLANNED READMISSION 2.0             11.9 Total Score        Discharging to Facility/ Agency   Name: [facility]   Address:  Phone:  Fax:    Dialysis Facility (if applicable)   Name:  Address:  Dialysis Schedule:  Phone:  Fax:    / signature: Electronically signed by ORESTES Howard on 12/27/2024 at 2:26 PM      PHYSICIAN SECTION    Prognosis: {Prognosis:4530398940}    Condition at Discharge: { Patient Condition:336489515}    Rehab Potential (if transferring to Rehab): {Prognosis:4437037841}    Recommended Labs or Other Treatments After Discharge: ***    Physician Certification: I certify the above information and transfer of Sophie Awad  is necessary for the continuing treatment of the diagnosis listed and that she requires Skilled Nursing Facility for less 30 days.     Update Admission H&P: {CHP DME Changes in HandP:127467668}    PHYSICIAN SIGNATURE:  Electronically signed by Yusef Kerr MD on 12/28/24 at 8:29 AM EST

## 2024-12-27 NOTE — PROGRESS NOTES
Sophie Awad is a 79 y.o. right handed female     Neurology following for encephalopathy, concern for stroke    PMH of arthritis, brain aneurysm s/p aneurysm clipping, hypertension, and lupus    Assessment:     Metabolic encephalopathy versus stroke by clinical assessment  Patient with recent UTI  Patient presented with confusion and aphasia  Unable to have MRI brain  EEG negative for seizures  Stroke risk factors include hypertension and age    Plan:     Repeat CT head read pending  Continue SLP  Lipid panel  Continue Plavix  Continue Lipitor with an LDL goal <70  Continue to treat UTI  Continue B12 1000 mcg daily  Carotid ultrasounds may have as outpatient  Neurology will sign off call if needed  Follow-up with neurology after discharge    History of Present Illness:     Patient presented to the ER on 12/24/2024 after experiencing confusion at home.  She was recently discharged from [facility] after experiencing a UTI.  She was discharged home on antibiotics, however her son was unable to  the prescriptions.  She was unable to ambulate and they decided to bring her to the ER for evaluation.    Her CT head was negative for abnormalities    Neurology was consulted for confusion    Subjective:     Patient lying in bed awake and alert, she is oriented to person and place today.  She is unable to tell me the month of the year, she says \"ask me another question\".  She comes frustrated during exam and says \"I cannot say the word\".     She is able to follow my commands today including two-step commands.    Spoke with martin Diaz on the phone today, he says patient does become confused, off balance, and aphasic when she has a UTI.      Objective:       /80   Pulse 80   Temp 97.1 °F (36.2 °C) (Temporal)   Resp 18   Ht 1.676 m (5' 6\")   Wt 68 kg (150 lb)   SpO2 95%   BMI 24.21 kg/m²       General appearance: alert, appears stated age, cooperative and no distress  Head: normocephalic,

## 2024-12-27 NOTE — CARE COORDINATION
SOCIAL WORK/[facility] TRANSITION OF CARE PLANNING( BRUCE SHIPLEY, [facility] [phone]): plan per son is home with outpatient Speech,  there is a order for outpatient speech in the soft chart to give to family. PT and OT added to see pt here this a.m. as she has not been out of bed per rn. She is on 2l nc and dose not have o2 at home pta. Blood and urine cx's are negative. Neurology is following. EEG is done and negative.  The pcp yesterday anticipated home today..ORESTES Howard  12/27/2024    PT and OT saw pt this a.m. and recommend that pt go to rehab. I called son, Cesilia, and he wants to talk with neurology before sending pt to rehab. I sent a message to the neurologist , Dr. Naik, to call the son. He wants to know why the big change within the past 3 weeks. And wants a dx before he will talk about rehab plan. ORESTES Howard  12/27/2024  Spoke with cesilia and he is now in agreement to [facility]. Choices offered and he has no preference. He wants charly, referral made and accepted. All discharge paper work with ambulette form in the soft chart and pasrr done. They can take when pt is ready. .ORESTES Howard  12/27/2024

## 2024-12-28 VITALS
HEART RATE: 72 BPM | TEMPERATURE: 98 F | SYSTOLIC BLOOD PRESSURE: 98 MMHG | BODY MASS INDEX: 24.11 KG/M2 | DIASTOLIC BLOOD PRESSURE: 65 MMHG | RESPIRATION RATE: 17 BRPM | HEIGHT: 66 IN | OXYGEN SATURATION: 91 % | WEIGHT: 150 LBS

## 2024-12-28 LAB
CHOLEST SERPL-MCNC: 135 MG/DL
EKG ATRIAL RATE: 98 BPM
EKG P AXIS: 23 DEGREES
EKG P-R INTERVAL: 182 MS
EKG Q-T INTERVAL: 350 MS
EKG QRS DURATION: 76 MS
EKG QTC CALCULATION (BAZETT): 446 MS
EKG R AXIS: 3 DEGREES
EKG T AXIS: 16 DEGREES
EKG VENTRICULAR RATE: 98 BPM
HDLC SERPL-MCNC: 36 MG/DL
LDLC SERPL CALC-MCNC: 83 MG/DL
TRIGL SERPL-MCNC: 82 MG/DL
VLDLC SERPL CALC-MCNC: 16 MG/DL

## 2024-12-28 PROCEDURE — 6370000000 HC RX 637 (ALT 250 FOR IP)

## 2024-12-28 PROCEDURE — 6360000002 HC RX W HCPCS

## 2024-12-28 PROCEDURE — 2500000003 HC RX 250 WO HCPCS

## 2024-12-28 PROCEDURE — 99239 HOSP IP/OBS DSCHRG MGMT >30: CPT | Performed by: INTERNAL MEDICINE

## 2024-12-28 RX ORDER — ATORVASTATIN CALCIUM 20 MG/1
20 TABLET, FILM COATED ORAL NIGHTLY
DISCHARGE
Start: 2024-12-28

## 2024-12-28 RX ADMIN — ENOXAPARIN SODIUM 40 MG: 100 INJECTION SUBCUTANEOUS at 09:24

## 2024-12-28 RX ADMIN — CYANOCOBALAMIN TAB 1000 MCG 1000 MCG: 1000 TAB at 09:24

## 2024-12-28 RX ADMIN — POLYETHYLENE GLYCOL 3350 17 G: 17 POWDER, FOR SOLUTION ORAL at 09:24

## 2024-12-28 RX ADMIN — METOPROLOL SUCCINATE 25 MG: 25 TABLET, EXTENDED RELEASE ORAL at 09:24

## 2024-12-28 RX ADMIN — SODIUM CHLORIDE, PRESERVATIVE FREE 10 ML: 5 INJECTION INTRAVENOUS at 09:24

## 2024-12-28 RX ADMIN — CLOPIDOGREL BISULFATE 75 MG: 75 TABLET ORAL at 09:24

## 2024-12-28 NOTE — CARE COORDINATION
SOCIAL WORK/[facility] TRANSITION OF CARE PLANNING( LYNETTE BRO, [phone]): discharge orders are in epic. Dr. Kerr wants to see pt before she leaves. Maryam is not available today. Joe the son will be here around 4 p.m. rn notified and greenbriar.Lynette Bro, ORESTES  12/28/2024

## 2024-12-28 NOTE — PLAN OF CARE
Problem: Discharge Planning  Goal: Discharge to home or other facility with appropriate resources  12/28/2024 0847 by Jeana Guevara RN  Outcome: Completed     Problem: Safety - Adult  Goal: Free from fall injury  12/28/2024 0847 by Jeana Guevara RN  Outcome: Completed     Problem: Skin/Tissue Integrity  Goal: Absence of new skin breakdown  Description: 1.  Monitor for areas of redness and/or skin breakdown  2.  Assess vascular access sites hourly  3.  Every 4-6 hours minimum:  Change oxygen saturation probe site  4.  Every 4-6 hours:  If on nasal continuous positive airway pressure, respiratory therapy assess nares and determine need for appliance change or resting period.  12/28/2024 0847 by Jeana Guevara RN  Outcome: Completed     Problem: Pain  Goal: Verbalizes/displays adequate comfort level or baseline comfort level  12/28/2024 0847 by Jeana Guevara RN  Outcome: Completed     Problem: Nutrition Deficit:  Goal: Optimize nutritional status  12/28/2024 0847 by Jeana Guevara RN  Outcome: Completed     Problem: Neurosensory - Adult  Goal: Achieves stable or improved neurological status  Outcome: Completed     Problem: Neurosensory - Adult  Goal: Absence of seizures  Outcome: Completed     Problem: Neurosensory - Adult  Goal: Remains free of injury related to seizures activity  Outcome: Completed     Problem: Neurosensory - Adult  Goal: Achieves maximal functionality and self care  Outcome: Completed     Problem: Skin/Tissue Integrity - Adult  Goal: Skin integrity remains intact  Outcome: Completed     Problem: Skin/Tissue Integrity - Adult  Goal: Incisions, wounds, or drain sites healing without S/S of infection  Outcome: Completed     Problem: Skin/Tissue Integrity - Adult  Goal: Oral mucous membranes remain intact  Outcome: Completed     Problem: Musculoskeletal - Adult  Goal: Return mobility to safest level of function  Outcome: Completed     Problem: Musculoskeletal - Adult  Goal: Maintain

## 2024-12-28 NOTE — DISCHARGE SUMMARY
Physician Discharge Summary     Patient ID:  Sophie Awad  [id number]  79 y.o.  1945    Admit date: 12/24/2024    Discharge date and time:  12/28/2024    Discharge Diagnoses: Principal Problem:    Encephalopathy  Active Problems:    Hypertension    Aphasia    Bacteriuria with pyuria    Altered mental status  Resolved Problems:    * No resolved hospital problems. *      Consults: IP CONSULT TO INTERNAL MEDICINE  IP CONSULT TO NEUROLOGY    Procedures: See below    Hospital Course: 79 y.o. year old female who has a past medical history of Arthritis, Brain aneurysm, Hypertension, and Lupus (HCC). Patient presented to emergency department via EMS for altered mental status. Of note, patient had recent hospitalization with discharged just yesterday where she was being treated for UTI. Allegedly son was not able to  antibiotic prescriptions for home and patient's symptoms worsened. He then requested EMS to bring patient to [facility] main campus. Patient allegedly confused at baseline but able to answer questions. Hospital course thus far unremarkable. Decision was made to admit patient to medicine for further management of altered mental status possibly secondary to urinary tract infection.       Bacteriuria with pyuria urine culture negative discontinue parenteral antibiotic  Urine culture negative from 12/24   CAD maintaining antiplatelet Plavix.  Controlling hypertension the same and she is able to swallow her medications.  Metabolic encephalopathy unable to do MRI brain due to previous aneurysm with coil and clip  EEG with intermittent left frontotemporal slowing with secondary spread to the right frontal lobe.  No epileptiform abnormalities noted.  Carotid ultrasound with less than 50% bilateral stenosis and patent vertebral arteries with antegrade flow.  Repeat head CT 12/27 stable postsurgical changes, no acute intracranial abnormality.    Discharge Exam:  See progress note from

## 2024-12-28 NOTE — PROGRESS NOTES
Synopsis: Patient admitted on 12/24/2024     Ms. Sophie Awad, a 79 y.o. year old female  who  has a past medical history of Arthritis, Brain aneurysm, Hypertension, and Lupus (HCC).  Patient presented to emergency department via EMS for altered mental status.  Of note, patient had recent hospitalization with discharged just yesterday where she was being treated for UTI.  Allegedly son was not able to  antibiotic prescriptions for home and patient's symptoms worsened.  He then requested EMS to bring patient to [facility] main campus.  Patient allegedly confused at baseline but able to answer questions.  Hospital course thus far unremarkable.  Decision was made to admit patient to medicine for further management of altered mental status possibly secondary to urinary tract infection.    Past Medical History:   has a past medical history of Arthritis, Brain aneurysm, Hypertension, and Lupus (HCC).    Past Surgical History:   Past Surgical History:   Procedure Laterality Date    APPENDECTOMY      CEREBRAL ANEURYSM REPAIR      6     CHOLECYSTECTOMY  4/8/2016    cholelithias    INCONTINENCE SURGERY         Social History:   reports that she quit smoking about 38 years ago. She started smoking about 59 years ago. She has a 21 pack-year smoking history. She has never used smokeless tobacco. She reports that she does not drink alcohol and does not use drugs.     Family History:   Family History   Family history unknown: Yes       Subjective  Stable but dysarthric at baseline    Exam:  /71   Pulse 72   Temp 98.4 °F (36.9 °C) (Oral)   Resp 13   Ht 1.676 m (5' 6\")   Wt 68 kg (150 lb)   SpO2 97%   BMI 24.21 kg/m²   General appearance: No apparent distress, appears stated age and cooperative.  HEENT: Pupils equal, round, and reactive to light. Conjunctivae/corneas clear.  Neck: Trachea midline.  Respiratory:  Normal respiratory effort. Clear to auscultation, bilaterally without

## 2024-12-29 LAB
MICROORGANISM SPEC CULT: NORMAL
MICROORGANISM SPEC CULT: NORMAL
SERVICE CMNT-IMP: NORMAL
SERVICE CMNT-IMP: NORMAL
SPECIMEN DESCRIPTION: NORMAL
SPECIMEN DESCRIPTION: NORMAL

## 2025-05-16 ENCOUNTER — OFFICE VISIT (OUTPATIENT)
Age: 80
End: 2025-05-16
Payer: MEDICAID

## 2025-05-16 VITALS
DIASTOLIC BLOOD PRESSURE: 90 MMHG | BODY MASS INDEX: 24.21 KG/M2 | WEIGHT: 150 LBS | OXYGEN SATURATION: 97 % | HEART RATE: 64 BPM | SYSTOLIC BLOOD PRESSURE: 145 MMHG | TEMPERATURE: 97.3 F

## 2025-05-16 DIAGNOSIS — I63.9 STROKE DETERMINED BY CLINICAL ASSESSMENT (HCC): Primary | ICD-10-CM

## 2025-05-16 DIAGNOSIS — R47.01 APHASIA: ICD-10-CM

## 2025-05-16 PROCEDURE — 3077F SYST BP >= 140 MM HG: CPT

## 2025-05-16 PROCEDURE — G8399 PT W/DXA RESULTS DOCUMENT: HCPCS

## 2025-05-16 PROCEDURE — 99213 OFFICE O/P EST LOW 20 MIN: CPT

## 2025-05-16 PROCEDURE — 99214 OFFICE O/P EST MOD 30 MIN: CPT

## 2025-05-16 PROCEDURE — G8427 DOCREV CUR MEDS BY ELIG CLIN: HCPCS

## 2025-05-16 PROCEDURE — 1036F TOBACCO NON-USER: CPT

## 2025-05-16 PROCEDURE — G8420 CALC BMI NORM PARAMETERS: HCPCS

## 2025-05-16 PROCEDURE — 1159F MED LIST DOCD IN RCRD: CPT

## 2025-05-16 PROCEDURE — 1160F RVW MEDS BY RX/DR IN RCRD: CPT

## 2025-05-16 PROCEDURE — 3080F DIAST BP >= 90 MM HG: CPT

## 2025-05-16 PROCEDURE — 1090F PRES/ABSN URINE INCON ASSESS: CPT

## 2025-05-16 PROCEDURE — 1123F ACP DISCUSS/DSCN MKR DOCD: CPT

## 2025-05-16 NOTE — PROGRESS NOTES
[facility]  NEUROLOGY  Chetan Rich, DNP, APRN, CNS  Ag Arthur, MSN, APRN-FNP-C  Radha Larsen MSN, APRN, FNP-C  Lizet Hurley MSPAS, PA-C  Zaynab Juarez MSN, APRN, FNP-C  Theresa Mcclain, MSN, APRN, FNP-BC  [address] [id number]  Phone: [phone]  Fax: [phone]        Sophie Awad is a 79 y.o. right handed female     Patient presents to neurology today for evaluation management of her recent hospitalization    PMH of arthritis, brain aneurysm s/p aneurysm clipping, hypertension, and lupus     Assessment:     Stroke by clinical assessment  Presented with confusion and aphasia  CT head negative for abnormalities however unable to have MRI brain  Stroke risk factors include hypertension and age    Plan:     Continue Plavix  Continue statin  Restart speech therapy  Restart PT/OT  Follow-up in 6 months  Call with questions or concerns    History of Present Illness:     Patient presented to the ER on 12/24/2024 after experiencing confusion at home.  She was recently discharged from [place] after experiencing a UTI.  She was discharged home on antibiotics, however her son was unable to  the prescriptions.  She was unable to ambulate and they decided to bring her to the ER for evaluation.     Her CT head was negative for abnormalities     Neurology was consulted for confusion    She has a history of aneurysm clipping in this is compatible with MRI    Spoke with martin Diaz on the phone, he said patient does become confused, off balance, and aphasic when she has a UTI.    While in the hospital she became frustrated during exam and said \"I cannot say the word\".     Subjective:     Patient presents alone and is pleasant and cooperative.    Patient continues to reside in [facility].  She continues to experience expressive and receptive aphasia however this has greatly improved since her last appointment.  She continues to

## 2025-06-15 ENCOUNTER — HOSPITAL ENCOUNTER (EMERGENCY)
Age: 80
Discharge: NURSING, REHAB OR LONG-TERM CARE | End: 2025-06-16
Attending: STUDENT IN AN ORGANIZED HEALTH CARE EDUCATION/TRAINING PROGRAM
Payer: MEDICAID

## 2025-06-15 ENCOUNTER — APPOINTMENT (OUTPATIENT)
Dept: CT IMAGING | Age: 80
End: 2025-06-15
Payer: MEDICAID

## 2025-06-15 VITALS
TEMPERATURE: 98.2 F | OXYGEN SATURATION: 98 % | DIASTOLIC BLOOD PRESSURE: 103 MMHG | WEIGHT: 150 LBS | BODY MASS INDEX: 24.11 KG/M2 | HEART RATE: 82 BPM | HEIGHT: 66 IN | RESPIRATION RATE: 18 BRPM | SYSTOLIC BLOOD PRESSURE: 144 MMHG

## 2025-06-15 DIAGNOSIS — R10.84 GENERALIZED ABDOMINAL PAIN: Primary | ICD-10-CM

## 2025-06-15 PROCEDURE — 99285 EMERGENCY DEPT VISIT HI MDM: CPT

## 2025-06-15 RX ORDER — ONDANSETRON 2 MG/ML
4 INJECTION INTRAMUSCULAR; INTRAVENOUS ONCE
Status: COMPLETED | OUTPATIENT
Start: 2025-06-16 | End: 2025-06-16

## 2025-06-15 RX ORDER — DICYCLOMINE HYDROCHLORIDE 10 MG/ML
20 INJECTION INTRAMUSCULAR ONCE
Status: COMPLETED | OUTPATIENT
Start: 2025-06-16 | End: 2025-06-16

## 2025-06-15 ASSESSMENT — PAIN - FUNCTIONAL ASSESSMENT: PAIN_FUNCTIONAL_ASSESSMENT: 0-10

## 2025-06-15 ASSESSMENT — PAIN DESCRIPTION - DESCRIPTORS: DESCRIPTORS: ACHING

## 2025-06-15 ASSESSMENT — PAIN SCALES - GENERAL: PAINLEVEL_OUTOF10: 6

## 2025-06-15 ASSESSMENT — PAIN DESCRIPTION - LOCATION: LOCATION: ABDOMEN

## 2025-06-16 ENCOUNTER — APPOINTMENT (OUTPATIENT)
Dept: GENERAL RADIOLOGY | Age: 80
End: 2025-06-16
Payer: MEDICAID

## 2025-06-16 ENCOUNTER — APPOINTMENT (OUTPATIENT)
Dept: CT IMAGING | Age: 80
End: 2025-06-16
Payer: MEDICAID

## 2025-06-16 LAB
ALBUMIN SERPL-MCNC: 3.2 G/DL (ref 3.5–5.2)
ALP SERPL-CCNC: 256 U/L (ref 35–104)
ALT SERPL-CCNC: 50 U/L (ref 0–32)
ANION GAP SERPL CALCULATED.3IONS-SCNC: 12 MMOL/L (ref 7–16)
AST SERPL-CCNC: 67 U/L (ref 0–31)
BACTERIA URNS QL MICRO: ABNORMAL
BASOPHILS # BLD: 0.04 K/UL (ref 0–0.2)
BASOPHILS NFR BLD: 0 % (ref 0–2)
BILIRUB SERPL-MCNC: 1.2 MG/DL (ref 0–1.2)
BILIRUB UR QL STRIP: ABNORMAL
BUN SERPL-MCNC: 13 MG/DL (ref 6–23)
CALCIUM SERPL-MCNC: 10 MG/DL (ref 8.6–10.2)
CHLORIDE SERPL-SCNC: 100 MMOL/L (ref 98–107)
CLARITY UR: CLEAR
CO2 SERPL-SCNC: 27 MMOL/L (ref 22–29)
COLOR UR: ABNORMAL
CREAT SERPL-MCNC: 0.6 MG/DL (ref 0.5–1)
EOSINOPHIL # BLD: 0.13 K/UL (ref 0.05–0.5)
EOSINOPHILS RELATIVE PERCENT: 1 % (ref 0–6)
ERYTHROCYTE [DISTWIDTH] IN BLOOD BY AUTOMATED COUNT: 13.4 % (ref 11.5–15)
GFR, ESTIMATED: >90 ML/MIN/1.73M2
GLUCOSE SERPL-MCNC: 106 MG/DL (ref 74–99)
GLUCOSE UR STRIP-MCNC: NEGATIVE MG/DL
HCT VFR BLD AUTO: 43.4 % (ref 34–48)
HGB BLD-MCNC: 14.6 G/DL (ref 11.5–15.5)
HGB UR QL STRIP.AUTO: NEGATIVE
IMM GRANULOCYTES # BLD AUTO: 0.04 K/UL (ref 0–0.58)
IMM GRANULOCYTES NFR BLD: 0 % (ref 0–5)
KETONES UR STRIP-MCNC: NEGATIVE MG/DL
LACTATE BLDV-SCNC: 1.2 MMOL/L (ref 0.5–2.2)
LEUKOCYTE ESTERASE UR QL STRIP: NEGATIVE
LIPASE SERPL-CCNC: 16 U/L (ref 13–60)
LYMPHOCYTES NFR BLD: 2.87 K/UL (ref 1.5–4)
LYMPHOCYTES RELATIVE PERCENT: 31 % (ref 20–42)
MAGNESIUM SERPL-MCNC: 2 MG/DL (ref 1.6–2.6)
MCH RBC QN AUTO: 31.3 PG (ref 26–35)
MCHC RBC AUTO-ENTMCNC: 33.6 G/DL (ref 32–34.5)
MCV RBC AUTO: 93.1 FL (ref 80–99.9)
MONOCYTES NFR BLD: 1.25 K/UL (ref 0.1–0.95)
MONOCYTES NFR BLD: 13 % (ref 2–12)
MUCOUS THREADS URNS QL MICRO: PRESENT
NEUTROPHILS NFR BLD: 54 % (ref 43–80)
NEUTS SEG NFR BLD: 5.08 K/UL (ref 1.8–7.3)
NITRITE UR QL STRIP: NEGATIVE
PH UR STRIP: 6 [PH] (ref 5–8)
PLATELET CONFIRMATION: NORMAL
PLATELET, FLUORESCENCE: 243 K/UL (ref 130–450)
PMV BLD AUTO: 10.3 FL (ref 7–12)
POTASSIUM SERPL-SCNC: 3.4 MMOL/L (ref 3.5–5)
PROT SERPL-MCNC: 8.8 G/DL (ref 6.4–8.3)
PROT UR STRIP-MCNC: NEGATIVE MG/DL
RBC # BLD AUTO: 4.66 M/UL (ref 3.5–5.5)
RBC #/AREA URNS HPF: ABNORMAL /HPF
SODIUM SERPL-SCNC: 139 MMOL/L (ref 132–146)
SP GR UR STRIP: 1.02 (ref 1–1.03)
TROPONIN I SERPL HS-MCNC: 9 NG/L (ref 0–14)
TROPONIN I SERPL HS-MCNC: 9 NG/L (ref 0–14)
UROBILINOGEN UR STRIP-ACNC: 2 EU/DL (ref 0–1)
WBC #/AREA URNS HPF: ABNORMAL /HPF
WBC OTHER # BLD: 9.4 K/UL (ref 4.5–11.5)

## 2025-06-16 PROCEDURE — 83605 ASSAY OF LACTIC ACID: CPT

## 2025-06-16 PROCEDURE — 83690 ASSAY OF LIPASE: CPT

## 2025-06-16 PROCEDURE — 6360000002 HC RX W HCPCS

## 2025-06-16 PROCEDURE — 96374 THER/PROPH/DIAG INJ IV PUSH: CPT

## 2025-06-16 PROCEDURE — 83735 ASSAY OF MAGNESIUM: CPT

## 2025-06-16 PROCEDURE — 71045 X-RAY EXAM CHEST 1 VIEW: CPT

## 2025-06-16 PROCEDURE — 80053 COMPREHEN METABOLIC PANEL: CPT

## 2025-06-16 PROCEDURE — 96372 THER/PROPH/DIAG INJ SC/IM: CPT

## 2025-06-16 PROCEDURE — 81001 URINALYSIS AUTO W/SCOPE: CPT

## 2025-06-16 PROCEDURE — 84484 ASSAY OF TROPONIN QUANT: CPT

## 2025-06-16 PROCEDURE — 87086 URINE CULTURE/COLONY COUNT: CPT

## 2025-06-16 PROCEDURE — 74176 CT ABD & PELVIS W/O CONTRAST: CPT

## 2025-06-16 PROCEDURE — 93005 ELECTROCARDIOGRAM TRACING: CPT

## 2025-06-16 PROCEDURE — 85025 COMPLETE CBC W/AUTO DIFF WBC: CPT

## 2025-06-16 RX ORDER — POTASSIUM CHLORIDE 1500 MG/1
20 TABLET, EXTENDED RELEASE ORAL ONCE
Status: DISCONTINUED | OUTPATIENT
Start: 2025-06-16 | End: 2025-06-16

## 2025-06-16 RX ORDER — PANTOPRAZOLE SODIUM 40 MG/1
40 TABLET, DELAYED RELEASE ORAL
Qty: 30 TABLET | Refills: 0 | Status: SHIPPED | OUTPATIENT
Start: 2025-06-16

## 2025-06-16 RX ORDER — POTASSIUM CHLORIDE 7.45 MG/ML
10 INJECTION INTRAVENOUS ONCE
Status: COMPLETED | OUTPATIENT
Start: 2025-06-16 | End: 2025-06-16

## 2025-06-16 RX ADMIN — DICYCLOMINE HYDROCHLORIDE 20 MG: 10 INJECTION, SOLUTION INTRAMUSCULAR at 00:50

## 2025-06-16 RX ADMIN — ONDANSETRON 4 MG: 2 INJECTION, SOLUTION INTRAMUSCULAR; INTRAVENOUS at 00:50

## 2025-06-16 RX ADMIN — POTASSIUM CHLORIDE 10 MEQ: 7.46 INJECTION, SOLUTION INTRAVENOUS at 03:14

## 2025-06-16 NOTE — ED PROVIDER NOTES
[facility] EMERGENCY DEPARTMENT  EMERGENCY DEPARTMENT ENCOUNTER      Pt Name: Sophie Awad  MRN: [id number]  Birthdate 1945  Date of evaluation: 6/15/2025  Provider: Brock Jeronimo MD  PCP: Michelle, Pcp  Note Started: 11:14 PM EDT 6/15/25    CHIEF COMPLAINT       Chief Complaint   Patient presents with    Abdominal Pain       HISTORY OF PRESENT ILLNESS: 1 or more Elements   History From: Patient, chart review, patient's son  Limitations to history : Baseline aphasia    Sophie Awad is a 79 y.o. female who presents BIBEMS from nursing home where she is residing with complaints of generalized abdominal pain ongoing for the past day or so.  Patient with baseline aphasia.  Able to answer yes or no to most questions and verbalized complaints however speech is somewhat fragmented.  Patient's son presents at bedside who reports patient does appear at neurologic baseline.  Surgical abdominal history of appendectomy and cholecystectomy.  Endorses generalized abdominal pain with some decreased p.o. intake earlier today.  Denies emesis, objective fevers, chest pain or pressure, shortness of breath, dysuria, diarrhea, constipation.    Nursing Notes were all reviewed and agreed with or any disagreements were addressed in the HPI.    REVIEW OF SYSTEMS :    Positives and Pertinent negatives as per HPI.     PAST MEDICAL HISTORY/Chronic Conditions Affecting Care    has a past medical history of Arthritis, Brain aneurysm, Hypertension, and Lupus.     SURGICAL HISTORY     Past Surgical History:   Procedure Laterality Date    APPENDECTOMY      BRAIN ANEURYSM SURGERY      6     CHOLECYSTECTOMY  4/8/2016    cholelithias    INCONTINENCE SURGERY         CURRENTMEDICATIONS       Current Discharge Medication List        CONTINUE these medications which have NOT CHANGED    Details   acetaminophen (TYLENOL) 325 MG tablet Take 2 tablets by mouth every 6 hours as needed for Pain      atorvastatin (LIPITOR) 20 MG

## 2025-06-16 NOTE — DISCHARGE INSTRUCTIONS
XR CHEST PORTABLE   Final Result   1. No acute cardiopulmonary process.   2. Chronic right perihilar atelectasis.   3. Right shoulder rotator cuff pathology.         CT ABDOMEN PELVIS WO CONTRAST Additional Contrast? None   Final Result   1. No acute intra-abdominal or pelvic process.   2. Nonobstructing left renal calculi.   3. Diverticulosis.           Abdominal Pain  Abdominal pain can be caused by many things. Your caregiver decides the seriousness of your pain by an examination and possibly blood tests and X-rays. Many cases can be observed and treated at home. Most abdominal pain is not caused by a disease and will probably improve without treatment. However, in many cases, more time must pass before a clear cause of the pain can be found. Before that point, it may not be known if you need more testing, or if hospitalization or surgery is needed.  HOME CARE INSTRUCTIONS  Do not take laxatives unless directed by your caregiver.  Take pain medicine only as directed by your caregiver.  Only take over-the-counter or prescription medicines for pain, discomfort, or fever as directed by your caregiver.  Try a clear liquid diet (broth, tea, or water) as ordered by your caregiver. Slowly move to a bland diet as tolerated.  SEEK IMMEDIATE MEDICAL CARE IF:  The pain does not go away.  You or your child has an oral temperature above 102° F (38.9° C), not controlled by medicine.  You keep throwing up (vomiting).  The pain is felt only in portions of the abdomen. Pain in the right side could possibly be appendicitis. In an adult, pain in the left lower portion of the abdomen could be colitis or diverticulitis.  You pass bloody or black tarry stools.  MAKE SURE YOU:  Understand these instructions.  Will watch your condition.  Will get help right away if you are not doing well or get worse.

## 2025-06-17 ENCOUNTER — HOSPITAL ENCOUNTER (EMERGENCY)
Age: 80
Discharge: HOME | End: 2025-06-18
Payer: MEDICAID

## 2025-06-17 VITALS
WEIGHT: 150 LBS | DIASTOLIC BLOOD PRESSURE: 98 MMHG | TEMPERATURE: 98.2 F | OXYGEN SATURATION: 94 % | RESPIRATION RATE: 17 BRPM | BODY MASS INDEX: 24.11 KG/M2 | SYSTOLIC BLOOD PRESSURE: 154 MMHG | HEART RATE: 71 BPM | HEIGHT: 66 IN

## 2025-06-17 DIAGNOSIS — R74.8 ELEVATED ALKALINE PHOSPHATASE LEVEL: ICD-10-CM

## 2025-06-17 DIAGNOSIS — R74.01 TRANSAMINITIS: ICD-10-CM

## 2025-06-17 DIAGNOSIS — R10.30 LOWER ABDOMINAL PAIN: Primary | ICD-10-CM

## 2025-06-17 DIAGNOSIS — I10 ELEVATED BLOOD PRESSURE READING WITH DIAGNOSIS OF HYPERTENSION: ICD-10-CM

## 2025-06-17 LAB
ALBUMIN SERPL-MCNC: 2.7 G/DL (ref 3.5–5.2)
ALP SERPL-CCNC: 244 U/L (ref 35–104)
ALT SERPL-CCNC: 57 U/L (ref 0–32)
ANION GAP SERPL CALCULATED.3IONS-SCNC: 10 MMOL/L (ref 7–16)
AST SERPL-CCNC: 76 U/L (ref 0–31)
BASOPHILS # BLD: 0.05 K/UL (ref 0–0.2)
BASOPHILS NFR BLD: 1 % (ref 0–2)
BILIRUB SERPL-MCNC: 0.7 MG/DL (ref 0–1.2)
BILIRUB UR QL STRIP: ABNORMAL
BUN SERPL-MCNC: 14 MG/DL (ref 6–23)
CALCIUM SERPL-MCNC: 9.6 MG/DL (ref 8.6–10.2)
CHLORIDE SERPL-SCNC: 103 MMOL/L (ref 98–107)
CLARITY UR: CLEAR
CO2 SERPL-SCNC: 24 MMOL/L (ref 22–29)
COLOR UR: YELLOW
CREAT SERPL-MCNC: 0.5 MG/DL (ref 0.5–1)
EOSINOPHIL # BLD: 0.18 K/UL (ref 0.05–0.5)
EOSINOPHILS RELATIVE PERCENT: 3 % (ref 0–6)
ERYTHROCYTE [DISTWIDTH] IN BLOOD BY AUTOMATED COUNT: 13.3 % (ref 11.5–15)
GFR, ESTIMATED: >90 ML/MIN/1.73M2
GLUCOSE SERPL-MCNC: 96 MG/DL (ref 74–99)
GLUCOSE UR STRIP-MCNC: NEGATIVE MG/DL
HCT VFR BLD AUTO: 44.3 % (ref 34–48)
HGB BLD-MCNC: 13.7 G/DL (ref 11.5–15.5)
HGB UR QL STRIP.AUTO: NEGATIVE
IMM GRANULOCYTES # BLD AUTO: <0.03 K/UL (ref 0–0.58)
IMM GRANULOCYTES NFR BLD: 0 % (ref 0–5)
KETONES UR STRIP-MCNC: NEGATIVE MG/DL
LACTATE BLDV-SCNC: 1.2 MMOL/L (ref 0.5–2.2)
LEUKOCYTE ESTERASE UR QL STRIP: NEGATIVE
LIPASE SERPL-CCNC: 20 U/L (ref 13–60)
LYMPHOCYTES NFR BLD: 2.1 K/UL (ref 1.5–4)
LYMPHOCYTES RELATIVE PERCENT: 30 % (ref 20–42)
MCH RBC QN AUTO: 30.5 PG (ref 26–35)
MCHC RBC AUTO-ENTMCNC: 30.9 G/DL (ref 32–34.5)
MCV RBC AUTO: 98.7 FL (ref 80–99.9)
MICROORGANISM SPEC CULT: NO GROWTH
MONOCYTES NFR BLD: 0.85 K/UL (ref 0.1–0.95)
MONOCYTES NFR BLD: 12 % (ref 2–12)
NEUTROPHILS NFR BLD: 55 % (ref 43–80)
NEUTS SEG NFR BLD: 3.92 K/UL (ref 1.8–7.3)
NITRITE UR QL STRIP: NEGATIVE
PH UR STRIP: 5.5 [PH] (ref 5–8)
PLATELET # BLD AUTO: 242 K/UL (ref 130–450)
PMV BLD AUTO: 10 FL (ref 7–12)
POTASSIUM SERPL-SCNC: 5 MMOL/L (ref 3.5–5)
PROT SERPL-MCNC: 8.2 G/DL (ref 6.4–8.3)
PROT UR STRIP-MCNC: NEGATIVE MG/DL
RBC # BLD AUTO: 4.49 M/UL (ref 3.5–5.5)
RBC #/AREA URNS HPF: ABNORMAL /HPF
SERVICE CMNT-IMP: NORMAL
SODIUM SERPL-SCNC: 137 MMOL/L (ref 132–146)
SP GR UR STRIP: 1.02 (ref 1–1.03)
SPECIMEN DESCRIPTION: NORMAL
TROPONIN I SERPL HS-MCNC: 9 NG/L (ref 0–14)
UROBILINOGEN UR STRIP-ACNC: 4 EU/DL (ref 0–1)
WBC #/AREA URNS HPF: ABNORMAL /HPF
WBC OTHER # BLD: 7.1 K/UL (ref 4.5–11.5)

## 2025-06-17 PROCEDURE — 80053 COMPREHEN METABOLIC PANEL: CPT

## 2025-06-17 PROCEDURE — 81001 URINALYSIS AUTO W/SCOPE: CPT

## 2025-06-17 PROCEDURE — 96372 THER/PROPH/DIAG INJ SC/IM: CPT

## 2025-06-17 PROCEDURE — 85025 COMPLETE CBC W/AUTO DIFF WBC: CPT

## 2025-06-17 PROCEDURE — 99284 EMERGENCY DEPT VISIT MOD MDM: CPT

## 2025-06-17 PROCEDURE — 93005 ELECTROCARDIOGRAM TRACING: CPT

## 2025-06-17 PROCEDURE — 84484 ASSAY OF TROPONIN QUANT: CPT

## 2025-06-17 PROCEDURE — 83605 ASSAY OF LACTIC ACID: CPT

## 2025-06-17 PROCEDURE — 6360000002 HC RX W HCPCS

## 2025-06-17 PROCEDURE — 83690 ASSAY OF LIPASE: CPT

## 2025-06-17 PROCEDURE — 87086 URINE CULTURE/COLONY COUNT: CPT

## 2025-06-17 RX ORDER — DICYCLOMINE HCL 20 MG
20 TABLET ORAL 3 TIMES DAILY
Qty: 21 TABLET | Refills: 0 | Status: SHIPPED | OUTPATIENT
Start: 2025-06-17

## 2025-06-17 RX ORDER — DICYCLOMINE HYDROCHLORIDE 10 MG/ML
20 INJECTION INTRAMUSCULAR ONCE
Status: COMPLETED | OUTPATIENT
Start: 2025-06-17 | End: 2025-06-17

## 2025-06-17 RX ADMIN — DICYCLOMINE HYDROCHLORIDE 20 MG: 10 INJECTION, SOLUTION INTRAMUSCULAR at 15:11

## 2025-06-17 ASSESSMENT — PAIN SCALES - GENERAL: PAINLEVEL_OUTOF10: 7

## 2025-06-17 ASSESSMENT — PAIN DESCRIPTION - ORIENTATION
ORIENTATION: MID;LOWER
ORIENTATION: MID;LOWER

## 2025-06-17 ASSESSMENT — PAIN DESCRIPTION - DESCRIPTORS
DESCRIPTORS: ACHING
DESCRIPTORS: ACHING

## 2025-06-17 ASSESSMENT — PAIN DESCRIPTION - LOCATION
LOCATION: ABDOMEN
LOCATION: ABDOMEN

## 2025-06-17 ASSESSMENT — PAIN - FUNCTIONAL ASSESSMENT
PAIN_FUNCTIONAL_ASSESSMENT: PREVENTS OR INTERFERES SOME ACTIVE ACTIVITIES AND ADLS
PAIN_FUNCTIONAL_ASSESSMENT: PREVENTS OR INTERFERES SOME ACTIVE ACTIVITIES AND ADLS

## 2025-06-17 ASSESSMENT — LIFESTYLE VARIABLES
HOW MANY STANDARD DRINKS CONTAINING ALCOHOL DO YOU HAVE ON A TYPICAL DAY: PATIENT DOES NOT DRINK
HOW OFTEN DO YOU HAVE A DRINK CONTAINING ALCOHOL: NEVER

## 2025-06-17 ASSESSMENT — PAIN DESCRIPTION - PAIN TYPE: TYPE: ACUTE PAIN

## 2025-06-17 NOTE — ED PROVIDER NOTES
Independent SAMANTHA Visit. ***          [facility] EMERGENCY DEPARTMENT  EMERGENCY DEPARTMENT ENCOUNTER        Pt Name: Sophie Awad  MRN: [id number]  Birthdate 1945  Date of evaluation: 6/17/2025  Provider: SANDRA Ellsworth - CNP  PCP: Ian Davis MD  Note Started: 2:44 PM EDT 6/17/25    CHIEF COMPLAINT       Chief Complaint   Patient presents with    Abdominal Pain     Lower, denies n/v/d       HISTORY OF PRESENT ILLNESS: 1 or more Elements   History from : Patient, EMS, and chart review  Limitations to history : Dementia, expressive aphasia     Sophie Awad is a 79 y.o. female with a history of arthritis, brain aneurysm, mild dementia, hypertension, lupus, metabolic encephalopathy, sepsis due to UTI, and inability to ambulate who presents to the emergency department by ambulance from nursing facility, for evaluation of crampy and sharp diffuse lower abdominal without radiation which began 3 day(s) prior to arrival.  Patient does have baseline expressive aphasia, but is able to answer yes or no to most questions and verbalized her complaints, however speech is somewhat fragmented.  Since onset the symptoms have been waxing and waning.  The pain is associated with no additional symptoms.  The pain is aggravated by nothing in particular and relieved by nothing. There has been NO back pain, chest pain, shortness of breath, fever, chills, sweating, nausea, vomiting, anorexia, diarrhea, constipation, dark/black stools, blood in stool, urinary frequency, dysuria, hematuria, urinary urgency, or vaginal bleeding.     Nursing Notes were all reviewed and agreed with or any disagreements were addressed in the HPI.    REVIEW OF SYSTEMS :      Review of Systems    Positives and Pertinent negatives as per HPI.     PAST MEDICAL HISTORY    has a past medical history of Arthritis, Brain aneurysm, Hypertension, and Lupus.     SURGICAL HISTORY     Past Surgical History:   Procedure Laterality  changed from labs yesterday.  At that time her alkaline phosphatase was 256 her ALT was 50, and her AST was 67.  CT did not reflect any hepatic or pancreatic ductal obstruction or dilatation.  No evidence of infection on urinalysis today. On reevaluation, patient is  well-appearing, nontoxic, and not in distress. Symptoms improved after medications. This patient's ED course included: a personal history and physicial examination, re-evaluation prior to disposition, and multiple bedside re-evaluations.This patient has remained hemodynamically stable and improved during their ED course and at this time is without objective evidence of an acute process requiring additional evaluation or inpatient admission and is: Appropriate for outpatient management  . Prescription(s) provided for Bentyl.       PLAN OF CARE & COUNSELING:  SANDRA Ellsworth CNP reviewed today's visit with the patient. Nursing staff called report to nursing facility.  This included the differential, results, and plan of care, in addition to providing specific details for the plan of care and counseling regarding the diagnosis and prognosis and they are agreeable with the plan. All results reviewed with patient.  Nursing notes report called to nursing facility by nursing staff.  Please see their dictation for additional details.    Patient is currently established with a PMD and will follow-up in 1 days as instructed.I emphasized the importance of follow-up with the physician I referred them to in the timeframe recommended. I discussed with the patient emergent symptoms and the need to immediately return to the ER for any new or worsening symptoms as discussed above. Written information was included in their discharge instructions. Additional verbal discharge instructions were also given and discussed with the patient to supplement those generated by the EMR. We also discussed medications that were prescribed  including common side effects and

## 2025-06-18 LAB
EKG ATRIAL RATE: 69 BPM
EKG ATRIAL RATE: 77 BPM
EKG P AXIS: 22 DEGREES
EKG P AXIS: 26 DEGREES
EKG P-R INTERVAL: 198 MS
EKG P-R INTERVAL: 200 MS
EKG Q-T INTERVAL: 400 MS
EKG Q-T INTERVAL: 424 MS
EKG QRS DURATION: 88 MS
EKG QRS DURATION: 90 MS
EKG QTC CALCULATION (BAZETT): 452 MS
EKG QTC CALCULATION (BAZETT): 454 MS
EKG R AXIS: -11 DEGREES
EKG R AXIS: 8 DEGREES
EKG T AXIS: 14 DEGREES
EKG T AXIS: 45 DEGREES
EKG VENTRICULAR RATE: 69 BPM
EKG VENTRICULAR RATE: 77 BPM

## 2025-06-18 PROCEDURE — 93010 ELECTROCARDIOGRAM REPORT: CPT | Performed by: INTERNAL MEDICINE

## 2025-06-19 LAB
MICROORGANISM SPEC CULT: ABNORMAL
MICROORGANISM SPEC CULT: ABNORMAL
SERVICE CMNT-IMP: ABNORMAL
SPECIMEN DESCRIPTION: ABNORMAL

## 2025-07-17 ENCOUNTER — HOSPITAL ENCOUNTER (INPATIENT)
Age: 80
LOS: 2 days | Discharge: NURSING, REHAB OR LONG-TERM CARE | DRG: 690 | End: 2025-07-22
Attending: EMERGENCY MEDICINE | Admitting: INTERNAL MEDICINE
Payer: MEDICARE

## 2025-07-17 ENCOUNTER — APPOINTMENT (OUTPATIENT)
Dept: CT IMAGING | Age: 80
DRG: 690 | End: 2025-07-17
Payer: MEDICARE

## 2025-07-17 ENCOUNTER — APPOINTMENT (OUTPATIENT)
Dept: GENERAL RADIOLOGY | Age: 80
DRG: 690 | End: 2025-07-17
Payer: MEDICARE

## 2025-07-17 DIAGNOSIS — R50.9 FEVER, UNSPECIFIED FEVER CAUSE: Primary | ICD-10-CM

## 2025-07-17 DIAGNOSIS — E87.6 HYPOKALEMIA: ICD-10-CM

## 2025-07-17 LAB
ALBUMIN SERPL-MCNC: 3.2 G/DL (ref 3.5–5.2)
ALP SERPL-CCNC: 237 U/L (ref 35–104)
ALT SERPL-CCNC: 32 U/L (ref 0–35)
ANION GAP SERPL CALCULATED.3IONS-SCNC: 13 MMOL/L (ref 7–16)
AST SERPL-CCNC: 56 U/L (ref 0–35)
B PARAP IS1001 DNA NPH QL NAA+NON-PROBE: NOT DETECTED
B PERT DNA SPEC QL NAA+PROBE: NOT DETECTED
BACTERIA URNS QL MICRO: ABNORMAL
BASOPHILS # BLD: 0.04 K/UL (ref 0–0.2)
BASOPHILS NFR BLD: 0 % (ref 0–2)
BILIRUB DIRECT SERPL-MCNC: 0.4 MG/DL (ref 0–0.2)
BILIRUB INDIRECT SERPL-MCNC: 0.5 MG/DL (ref 0–1)
BILIRUB SERPL-MCNC: 0.9 MG/DL (ref 0–1.2)
BILIRUB UR QL STRIP: NEGATIVE
BUN SERPL-MCNC: 13 MG/DL (ref 8–23)
C PNEUM DNA NPH QL NAA+NON-PROBE: NOT DETECTED
CALCIUM SERPL-MCNC: 9.6 MG/DL (ref 8.8–10.2)
CHLORIDE SERPL-SCNC: 103 MMOL/L (ref 98–107)
CLARITY UR: CLEAR
CO2 SERPL-SCNC: 23 MMOL/L (ref 22–29)
COLOR UR: YELLOW
CREAT SERPL-MCNC: 0.6 MG/DL (ref 0.5–1)
EOSINOPHIL # BLD: 0.05 K/UL (ref 0.05–0.5)
EOSINOPHILS RELATIVE PERCENT: 1 % (ref 0–6)
ERYTHROCYTE [DISTWIDTH] IN BLOOD BY AUTOMATED COUNT: 13.2 % (ref 11.5–15)
FLUAV RNA NPH QL NAA+NON-PROBE: NOT DETECTED
FLUBV RNA NPH QL NAA+NON-PROBE: NOT DETECTED
GFR, ESTIMATED: >90 ML/MIN/1.73M2
GLUCOSE SERPL-MCNC: 120 MG/DL (ref 74–99)
GLUCOSE UR STRIP-MCNC: NEGATIVE MG/DL
HADV DNA NPH QL NAA+NON-PROBE: NOT DETECTED
HCOV 229E RNA NPH QL NAA+NON-PROBE: NOT DETECTED
HCOV HKU1 RNA NPH QL NAA+NON-PROBE: NOT DETECTED
HCOV NL63 RNA NPH QL NAA+NON-PROBE: NOT DETECTED
HCOV OC43 RNA NPH QL NAA+NON-PROBE: NOT DETECTED
HCT VFR BLD AUTO: 43 % (ref 34–48)
HGB BLD-MCNC: 14.1 G/DL (ref 11.5–15.5)
HGB UR QL STRIP.AUTO: ABNORMAL
HMPV RNA NPH QL NAA+NON-PROBE: NOT DETECTED
HPIV1 RNA NPH QL NAA+NON-PROBE: NOT DETECTED
HPIV2 RNA NPH QL NAA+NON-PROBE: NOT DETECTED
HPIV3 RNA NPH QL NAA+NON-PROBE: NOT DETECTED
HPIV4 RNA NPH QL NAA+NON-PROBE: NOT DETECTED
IMM GRANULOCYTES # BLD AUTO: 0.04 K/UL (ref 0–0.58)
IMM GRANULOCYTES NFR BLD: 0 % (ref 0–5)
KETONES UR STRIP-MCNC: ABNORMAL MG/DL
LACTATE BLDV-SCNC: 1.8 MMOL/L (ref 0.5–1.9)
LACTATE BLDV-SCNC: 1.8 MMOL/L (ref 0.5–2.2)
LEUKOCYTE ESTERASE UR QL STRIP: ABNORMAL
LIPASE SERPL-CCNC: 16 U/L (ref 13–60)
LYMPHOCYTES NFR BLD: 1.96 K/UL (ref 1.5–4)
LYMPHOCYTES RELATIVE PERCENT: 19 % (ref 20–42)
M PNEUMO DNA NPH QL NAA+NON-PROBE: NOT DETECTED
MAGNESIUM SERPL-MCNC: 1.8 MG/DL (ref 1.6–2.4)
MCH RBC QN AUTO: 30.9 PG (ref 26–35)
MCHC RBC AUTO-ENTMCNC: 32.8 G/DL (ref 32–34.5)
MCV RBC AUTO: 94.3 FL (ref 80–99.9)
MONOCYTES NFR BLD: 1.19 K/UL (ref 0.1–0.95)
MONOCYTES NFR BLD: 12 % (ref 2–12)
NEUTROPHILS NFR BLD: 68 % (ref 43–80)
NEUTS SEG NFR BLD: 6.91 K/UL (ref 1.8–7.3)
NITRITE UR QL STRIP: NEGATIVE
PH UR STRIP: 6 [PH] (ref 5–8)
PLATELET # BLD AUTO: 252 K/UL (ref 130–450)
PMV BLD AUTO: 9.6 FL (ref 7–12)
POTASSIUM SERPL-SCNC: 3.3 MMOL/L (ref 3.5–5.1)
PROT SERPL-MCNC: 8.7 G/DL (ref 6.4–8.3)
PROT UR STRIP-MCNC: ABNORMAL MG/DL
RBC # BLD AUTO: 4.56 M/UL (ref 3.5–5.5)
RBC #/AREA URNS HPF: ABNORMAL /HPF
RSV RNA NPH QL NAA+NON-PROBE: NOT DETECTED
RV+EV RNA NPH QL NAA+NON-PROBE: NOT DETECTED
SARS-COV-2 RNA NPH QL NAA+NON-PROBE: NOT DETECTED
SODIUM SERPL-SCNC: 139 MMOL/L (ref 136–145)
SP GR UR STRIP: 1.02 (ref 1–1.03)
SPECIMEN DESCRIPTION: NORMAL
UROBILINOGEN UR STRIP-ACNC: 1 EU/DL (ref 0–1)
WBC #/AREA URNS HPF: ABNORMAL /HPF
WBC OTHER # BLD: 10.2 K/UL (ref 4.5–11.5)

## 2025-07-17 PROCEDURE — 0202U NFCT DS 22 TRGT SARS-COV-2: CPT

## 2025-07-17 PROCEDURE — 96375 TX/PRO/DX INJ NEW DRUG ADDON: CPT

## 2025-07-17 PROCEDURE — G0378 HOSPITAL OBSERVATION PER HR: HCPCS

## 2025-07-17 PROCEDURE — 82248 BILIRUBIN DIRECT: CPT

## 2025-07-17 PROCEDURE — 81001 URINALYSIS AUTO W/SCOPE: CPT

## 2025-07-17 PROCEDURE — 6360000002 HC RX W HCPCS

## 2025-07-17 PROCEDURE — 51701 INSERT BLADDER CATHETER: CPT

## 2025-07-17 PROCEDURE — 71045 X-RAY EXAM CHEST 1 VIEW: CPT

## 2025-07-17 PROCEDURE — 93005 ELECTROCARDIOGRAM TRACING: CPT | Performed by: EMERGENCY MEDICINE

## 2025-07-17 PROCEDURE — 87086 URINE CULTURE/COLONY COUNT: CPT

## 2025-07-17 PROCEDURE — 6360000004 HC RX CONTRAST MEDICATION

## 2025-07-17 PROCEDURE — 87040 BLOOD CULTURE FOR BACTERIA: CPT

## 2025-07-17 PROCEDURE — 83735 ASSAY OF MAGNESIUM: CPT

## 2025-07-17 PROCEDURE — 2500000003 HC RX 250 WO HCPCS

## 2025-07-17 PROCEDURE — 85025 COMPLETE CBC W/AUTO DIFF WBC: CPT

## 2025-07-17 PROCEDURE — 80053 COMPREHEN METABOLIC PANEL: CPT

## 2025-07-17 PROCEDURE — 99285 EMERGENCY DEPT VISIT HI MDM: CPT

## 2025-07-17 PROCEDURE — 83690 ASSAY OF LIPASE: CPT

## 2025-07-17 PROCEDURE — 74177 CT ABD & PELVIS W/CONTRAST: CPT

## 2025-07-17 PROCEDURE — 83605 ASSAY OF LACTIC ACID: CPT

## 2025-07-17 PROCEDURE — 96365 THER/PROPH/DIAG IV INF INIT: CPT

## 2025-07-17 PROCEDURE — 6370000000 HC RX 637 (ALT 250 FOR IP)

## 2025-07-17 PROCEDURE — 96367 TX/PROPH/DG ADDL SEQ IV INF: CPT

## 2025-07-17 RX ORDER — DIPHENHYDRAMINE HYDROCHLORIDE 50 MG/ML
25 INJECTION, SOLUTION INTRAMUSCULAR; INTRAVENOUS ONCE
Status: DISCONTINUED | OUTPATIENT
Start: 2025-07-17 | End: 2025-07-17

## 2025-07-17 RX ORDER — METRONIDAZOLE 500 MG/100ML
500 INJECTION, SOLUTION INTRAVENOUS ONCE
Status: COMPLETED | OUTPATIENT
Start: 2025-07-17 | End: 2025-07-17

## 2025-07-17 RX ORDER — ACETAMINOPHEN 325 MG/1
650 TABLET ORAL ONCE
Status: COMPLETED | OUTPATIENT
Start: 2025-07-17 | End: 2025-07-17

## 2025-07-17 RX ORDER — IOPAMIDOL 755 MG/ML
75 INJECTION, SOLUTION INTRAVASCULAR
Status: COMPLETED | OUTPATIENT
Start: 2025-07-17 | End: 2025-07-17

## 2025-07-17 RX ORDER — DEXAMETHASONE SODIUM PHOSPHATE 10 MG/ML
10 INJECTION, SOLUTION INTRA-ARTICULAR; INTRALESIONAL; INTRAMUSCULAR; INTRAVENOUS; SOFT TISSUE ONCE
Status: COMPLETED | OUTPATIENT
Start: 2025-07-17 | End: 2025-07-17

## 2025-07-17 RX ORDER — POTASSIUM CHLORIDE 7.45 MG/ML
10 INJECTION INTRAVENOUS ONCE
Status: COMPLETED | OUTPATIENT
Start: 2025-07-17 | End: 2025-07-17

## 2025-07-17 RX ORDER — DIPHENHYDRAMINE HCL 25 MG
25 TABLET ORAL ONCE
Status: COMPLETED | OUTPATIENT
Start: 2025-07-17 | End: 2025-07-17

## 2025-07-17 RX ADMIN — WATER 1000 MG: 1 INJECTION INTRAMUSCULAR; INTRAVENOUS; SUBCUTANEOUS at 19:35

## 2025-07-17 RX ADMIN — POTASSIUM CHLORIDE 10 MEQ: 7.46 INJECTION, SOLUTION INTRAVENOUS at 20:54

## 2025-07-17 RX ADMIN — ACETAMINOPHEN 650 MG: 325 TABLET ORAL at 17:55

## 2025-07-17 RX ADMIN — IOPAMIDOL 75 ML: 755 INJECTION, SOLUTION INTRAVENOUS at 18:50

## 2025-07-17 RX ADMIN — DEXAMETHASONE SODIUM PHOSPHATE 10 MG: 10 INJECTION INTRAMUSCULAR; INTRAVENOUS at 17:59

## 2025-07-17 RX ADMIN — METRONIDAZOLE 500 MG: 500 INJECTION, SOLUTION INTRAVENOUS at 19:42

## 2025-07-17 RX ADMIN — DIPHENHYDRAMINE HCL 25 MG: 25 TABLET ORAL at 17:55

## 2025-07-17 ASSESSMENT — PAIN SCALES - GENERAL
PAINLEVEL_OUTOF10: 6
PAINLEVEL_OUTOF10: 5

## 2025-07-18 LAB
ALBUMIN SERPL-MCNC: 3 G/DL (ref 3.5–5.2)
ALP SERPL-CCNC: 201 U/L (ref 35–104)
ALT SERPL-CCNC: 31 U/L (ref 0–35)
ANION GAP SERPL CALCULATED.3IONS-SCNC: 12 MMOL/L (ref 7–16)
AST SERPL-CCNC: 52 U/L (ref 0–35)
BASOPHILS # BLD: 0.01 K/UL (ref 0–0.2)
BASOPHILS NFR BLD: 0 % (ref 0–2)
BILIRUB SERPL-MCNC: 0.6 MG/DL (ref 0–1.2)
BUN SERPL-MCNC: 14 MG/DL (ref 8–23)
CALCIUM SERPL-MCNC: 9.7 MG/DL (ref 8.8–10.2)
CHLORIDE SERPL-SCNC: 104 MMOL/L (ref 98–107)
CO2 SERPL-SCNC: 22 MMOL/L (ref 22–29)
CREAT SERPL-MCNC: 0.5 MG/DL (ref 0.5–1)
CRP SERPL HS-MCNC: 45.5 MG/L (ref 0–5)
EKG ATRIAL RATE: 99 BPM
EKG P AXIS: 1 DEGREES
EKG P-R INTERVAL: 164 MS
EKG Q-T INTERVAL: 340 MS
EKG QRS DURATION: 92 MS
EKG QTC CALCULATION (BAZETT): 436 MS
EKG R AXIS: -32 DEGREES
EKG T AXIS: 34 DEGREES
EKG VENTRICULAR RATE: 99 BPM
EOSINOPHIL # BLD: 0 K/UL (ref 0.05–0.5)
EOSINOPHILS RELATIVE PERCENT: 0 % (ref 0–6)
ERYTHROCYTE [DISTWIDTH] IN BLOOD BY AUTOMATED COUNT: 13.1 % (ref 11.5–15)
ERYTHROCYTE [SEDIMENTATION RATE] IN BLOOD BY WESTERGREN METHOD: 110 MM/HR (ref 0–20)
GFR, ESTIMATED: >90 ML/MIN/1.73M2
GLUCOSE SERPL-MCNC: 151 MG/DL (ref 74–99)
HCT VFR BLD AUTO: 38.3 % (ref 34–48)
HGB BLD-MCNC: 12.6 G/DL (ref 11.5–15.5)
IMM GRANULOCYTES # BLD AUTO: 0.03 K/UL (ref 0–0.58)
IMM GRANULOCYTES NFR BLD: 0 % (ref 0–5)
LYMPHOCYTES NFR BLD: 1.35 K/UL (ref 1.5–4)
LYMPHOCYTES RELATIVE PERCENT: 20 % (ref 20–42)
MCH RBC QN AUTO: 30.9 PG (ref 26–35)
MCHC RBC AUTO-ENTMCNC: 32.9 G/DL (ref 32–34.5)
MCV RBC AUTO: 93.9 FL (ref 80–99.9)
MICROORGANISM SPEC CULT: NO GROWTH
MONOCYTES NFR BLD: 0.36 K/UL (ref 0.1–0.95)
MONOCYTES NFR BLD: 5 % (ref 2–12)
NEUTROPHILS NFR BLD: 74 % (ref 43–80)
NEUTS SEG NFR BLD: 5.06 K/UL (ref 1.8–7.3)
PLATELET # BLD AUTO: 242 K/UL (ref 130–450)
PMV BLD AUTO: 9.8 FL (ref 7–12)
POTASSIUM SERPL-SCNC: 3.6 MMOL/L (ref 3.5–5.1)
PROT SERPL-MCNC: 8.3 G/DL (ref 6.4–8.3)
RBC # BLD AUTO: 4.08 M/UL (ref 3.5–5.5)
SERVICE CMNT-IMP: NORMAL
SODIUM SERPL-SCNC: 137 MMOL/L (ref 136–145)
SPECIMEN DESCRIPTION: NORMAL
WBC OTHER # BLD: 6.8 K/UL (ref 4.5–11.5)

## 2025-07-18 PROCEDURE — 96372 THER/PROPH/DIAG INJ SC/IM: CPT

## 2025-07-18 PROCEDURE — 97161 PT EVAL LOW COMPLEX 20 MIN: CPT

## 2025-07-18 PROCEDURE — 86140 C-REACTIVE PROTEIN: CPT

## 2025-07-18 PROCEDURE — 2500000003 HC RX 250 WO HCPCS: Performed by: NURSE PRACTITIONER

## 2025-07-18 PROCEDURE — 96367 TX/PROPH/DG ADDL SEQ IV INF: CPT

## 2025-07-18 PROCEDURE — 6370000000 HC RX 637 (ALT 250 FOR IP): Performed by: NURSE PRACTITIONER

## 2025-07-18 PROCEDURE — 85025 COMPLETE CBC W/AUTO DIFF WBC: CPT

## 2025-07-18 PROCEDURE — 93010 ELECTROCARDIOGRAM REPORT: CPT | Performed by: INTERNAL MEDICINE

## 2025-07-18 PROCEDURE — 2580000003 HC RX 258

## 2025-07-18 PROCEDURE — 97165 OT EVAL LOW COMPLEX 30 MIN: CPT

## 2025-07-18 PROCEDURE — 6360000002 HC RX W HCPCS: Performed by: NURSE PRACTITIONER

## 2025-07-18 PROCEDURE — 6360000002 HC RX W HCPCS

## 2025-07-18 PROCEDURE — 80053 COMPREHEN METABOLIC PANEL: CPT

## 2025-07-18 PROCEDURE — G0378 HOSPITAL OBSERVATION PER HR: HCPCS

## 2025-07-18 PROCEDURE — 96366 THER/PROPH/DIAG IV INF ADDON: CPT

## 2025-07-18 PROCEDURE — 85652 RBC SED RATE AUTOMATED: CPT

## 2025-07-18 RX ORDER — CLOPIDOGREL BISULFATE 75 MG/1
75 TABLET ORAL DAILY
Status: DISCONTINUED | OUTPATIENT
Start: 2025-07-18 | End: 2025-07-22 | Stop reason: HOSPADM

## 2025-07-18 RX ORDER — DOCUSATE SODIUM 100 MG/1
100 CAPSULE, LIQUID FILLED ORAL 2 TIMES DAILY
Status: DISCONTINUED | OUTPATIENT
Start: 2025-07-18 | End: 2025-07-22 | Stop reason: HOSPADM

## 2025-07-18 RX ORDER — SODIUM CHLORIDE 9 MG/ML
INJECTION, SOLUTION INTRAVENOUS PRN
Status: DISCONTINUED | OUTPATIENT
Start: 2025-07-18 | End: 2025-07-22 | Stop reason: HOSPADM

## 2025-07-18 RX ORDER — METOPROLOL SUCCINATE 25 MG/1
25 TABLET, EXTENDED RELEASE ORAL DAILY
Status: DISCONTINUED | OUTPATIENT
Start: 2025-07-18 | End: 2025-07-22 | Stop reason: HOSPADM

## 2025-07-18 RX ORDER — ENOXAPARIN SODIUM 100 MG/ML
40 INJECTION SUBCUTANEOUS DAILY
Status: DISCONTINUED | OUTPATIENT
Start: 2025-07-18 | End: 2025-07-22 | Stop reason: HOSPADM

## 2025-07-18 RX ORDER — SENNOSIDES 8.6 MG/1
1 TABLET ORAL DAILY PRN
Status: DISCONTINUED | OUTPATIENT
Start: 2025-07-18 | End: 2025-07-22 | Stop reason: HOSPADM

## 2025-07-18 RX ORDER — SODIUM CHLORIDE 0.9 % (FLUSH) 0.9 %
10 SYRINGE (ML) INJECTION PRN
Status: DISCONTINUED | OUTPATIENT
Start: 2025-07-18 | End: 2025-07-22 | Stop reason: HOSPADM

## 2025-07-18 RX ORDER — PSEUDOEPHEDRINE HCL 30 MG
100 TABLET ORAL 2 TIMES DAILY
DISCHARGE
Start: 2025-07-18

## 2025-07-18 RX ORDER — SENNOSIDES 8.6 MG/1
1 TABLET ORAL DAILY PRN
DISCHARGE
Start: 2025-07-18 | End: 2025-08-17

## 2025-07-18 RX ORDER — DICYCLOMINE HYDROCHLORIDE 10 MG/1
20 CAPSULE ORAL 3 TIMES DAILY
Status: DISCONTINUED | OUTPATIENT
Start: 2025-07-18 | End: 2025-07-22 | Stop reason: HOSPADM

## 2025-07-18 RX ORDER — TAMSULOSIN HYDROCHLORIDE 0.4 MG/1
0.4 CAPSULE ORAL DAILY
Status: DISCONTINUED | OUTPATIENT
Start: 2025-07-18 | End: 2025-07-22 | Stop reason: HOSPADM

## 2025-07-18 RX ORDER — ASCORBIC ACID 500 MG
500 TABLET ORAL DAILY
COMMUNITY

## 2025-07-18 RX ORDER — SODIUM CHLORIDE 0.9 % (FLUSH) 0.9 %
10 SYRINGE (ML) INJECTION EVERY 12 HOURS SCHEDULED
Status: DISCONTINUED | OUTPATIENT
Start: 2025-07-18 | End: 2025-07-22 | Stop reason: HOSPADM

## 2025-07-18 RX ORDER — ASCORBIC ACID 500 MG
500 TABLET ORAL DAILY
Status: DISCONTINUED | OUTPATIENT
Start: 2025-07-18 | End: 2025-07-22 | Stop reason: HOSPADM

## 2025-07-18 RX ORDER — ACETAMINOPHEN 650 MG/1
650 SUPPOSITORY RECTAL EVERY 6 HOURS PRN
Status: DISCONTINUED | OUTPATIENT
Start: 2025-07-18 | End: 2025-07-22 | Stop reason: HOSPADM

## 2025-07-18 RX ORDER — POTASSIUM CHLORIDE 1500 MG/1
40 TABLET, EXTENDED RELEASE ORAL PRN
Status: DISCONTINUED | OUTPATIENT
Start: 2025-07-18 | End: 2025-07-22 | Stop reason: HOSPADM

## 2025-07-18 RX ORDER — POTASSIUM CHLORIDE 7.45 MG/ML
10 INJECTION INTRAVENOUS PRN
Status: DISCONTINUED | OUTPATIENT
Start: 2025-07-18 | End: 2025-07-22 | Stop reason: HOSPADM

## 2025-07-18 RX ORDER — MAGNESIUM SULFATE IN WATER 40 MG/ML
2000 INJECTION, SOLUTION INTRAVENOUS PRN
Status: DISCONTINUED | OUTPATIENT
Start: 2025-07-18 | End: 2025-07-22 | Stop reason: HOSPADM

## 2025-07-18 RX ORDER — ATORVASTATIN CALCIUM 20 MG/1
20 TABLET, FILM COATED ORAL NIGHTLY
Status: DISCONTINUED | OUTPATIENT
Start: 2025-07-18 | End: 2025-07-18

## 2025-07-18 RX ORDER — SENNOSIDES 8.6 MG/1
1 TABLET ORAL NIGHTLY
Status: DISCONTINUED | OUTPATIENT
Start: 2025-07-18 | End: 2025-07-22 | Stop reason: HOSPADM

## 2025-07-18 RX ORDER — TAMSULOSIN HYDROCHLORIDE 0.4 MG/1
0.4 CAPSULE ORAL DAILY
COMMUNITY

## 2025-07-18 RX ORDER — FERROUS SULFATE 325(65) MG
325 TABLET ORAL
COMMUNITY

## 2025-07-18 RX ORDER — FERROUS SULFATE 325(65) MG
325 TABLET ORAL
Status: DISCONTINUED | OUTPATIENT
Start: 2025-07-18 | End: 2025-07-22 | Stop reason: HOSPADM

## 2025-07-18 RX ORDER — ACETAMINOPHEN 325 MG/1
650 TABLET ORAL EVERY 6 HOURS PRN
Status: DISCONTINUED | OUTPATIENT
Start: 2025-07-18 | End: 2025-07-22 | Stop reason: HOSPADM

## 2025-07-18 RX ORDER — METRONIDAZOLE 500 MG/100ML
500 INJECTION, SOLUTION INTRAVENOUS EVERY 8 HOURS
Status: DISCONTINUED | OUTPATIENT
Start: 2025-07-18 | End: 2025-07-18

## 2025-07-18 RX ORDER — ONDANSETRON 2 MG/ML
4 INJECTION INTRAMUSCULAR; INTRAVENOUS EVERY 6 HOURS PRN
Status: DISCONTINUED | OUTPATIENT
Start: 2025-07-18 | End: 2025-07-22 | Stop reason: HOSPADM

## 2025-07-18 RX ORDER — PANTOPRAZOLE SODIUM 40 MG/1
40 TABLET, DELAYED RELEASE ORAL
Status: DISCONTINUED | OUTPATIENT
Start: 2025-07-18 | End: 2025-07-18

## 2025-07-18 RX ORDER — SENNOSIDES 8.6 MG/1
1 TABLET ORAL NIGHTLY
DISCHARGE
Start: 2025-07-18 | End: 2025-08-17

## 2025-07-18 RX ORDER — ONDANSETRON 4 MG/1
4 TABLET, ORALLY DISINTEGRATING ORAL EVERY 8 HOURS PRN
Status: DISCONTINUED | OUTPATIENT
Start: 2025-07-18 | End: 2025-07-22 | Stop reason: HOSPADM

## 2025-07-18 RX ADMIN — METRONIDAZOLE 500 MG: 500 INJECTION, SOLUTION INTRAVENOUS at 03:09

## 2025-07-18 RX ADMIN — DOCUSATE SODIUM 100 MG: 100 CAPSULE, LIQUID FILLED ORAL at 10:09

## 2025-07-18 RX ADMIN — ENOXAPARIN SODIUM 40 MG: 100 INJECTION SUBCUTANEOUS at 10:10

## 2025-07-18 RX ADMIN — DICYCLOMINE HYDROCHLORIDE 20 MG: 10 CAPSULE ORAL at 15:31

## 2025-07-18 RX ADMIN — SENNOSIDES 8.6 MG: 8.6 TABLET, COATED ORAL at 19:52

## 2025-07-18 RX ADMIN — DICYCLOMINE HYDROCHLORIDE 20 MG: 10 CAPSULE ORAL at 10:08

## 2025-07-18 RX ADMIN — SODIUM CHLORIDE, PRESERVATIVE FREE 10 ML: 5 INJECTION INTRAVENOUS at 19:52

## 2025-07-18 RX ADMIN — METOPROLOL SUCCINATE 25 MG: 25 TABLET, EXTENDED RELEASE ORAL at 10:09

## 2025-07-18 RX ADMIN — SODIUM CHLORIDE, PRESERVATIVE FREE 10 ML: 5 INJECTION INTRAVENOUS at 10:16

## 2025-07-18 RX ADMIN — TAMSULOSIN HYDROCHLORIDE 0.4 MG: 0.4 CAPSULE ORAL at 10:09

## 2025-07-18 RX ADMIN — CEFEPIME 1000 MG: 1 INJECTION, POWDER, FOR SOLUTION INTRAMUSCULAR; INTRAVENOUS at 15:39

## 2025-07-18 RX ADMIN — DICYCLOMINE HYDROCHLORIDE 20 MG: 10 CAPSULE ORAL at 19:52

## 2025-07-18 RX ADMIN — CLOPIDOGREL BISULFATE 75 MG: 75 TABLET, FILM COATED ORAL at 10:09

## 2025-07-18 RX ADMIN — DOCUSATE SODIUM 100 MG: 100 CAPSULE, LIQUID FILLED ORAL at 19:52

## 2025-07-18 ASSESSMENT — PAIN SCALES - GENERAL: PAINLEVEL_OUTOF10: 0

## 2025-07-18 NOTE — PROGRESS NOTES
4 Eyes Skin Assessment     NAME:  Sophie Awad  YOB: 1945  MEDICAL RECORD NUMBER:  [id number]    The patient is being assessed for  Admission    I agree that at least one RN has performed a thorough Head to Toe Skin Assessment on the patient. ALL assessment sites listed below have been assessed.      Areas assessed by both nurses:    Head, Face, Ears, Shoulders, Back, Chest, Arms, Elbows, Hands, Sacrum. Buttock, Coccyx, Ischium, and Legs. Feet and Heels        Does the Patient have a Wound? No noted wound(s)       Luigi Prevention initiated by RN: Yes  Wound Care Orders initiated by RN: No    For hospital-acquired stage 1 & 2 and ALL Stage 3,4, Unstageable, DTI, NWPT, and Complex wounds: place order “IP Wound Care/Ostomy Nurse Eval and Treat” by RN under : NA    New Ostomies, if present place, Ostomy referral order under : No     Nurse 1 eSignature: Electronically signed by Jeannette Huang RN on 7/18/25 at 12:56 AM EDT    **SHARE this note so that the co-signing nurse can place an eSignature**    Nurse 2 eSignature: Electronically signed by Bartolo Mera RN on 7/18/25 at 12:57 AM EDT

## 2025-07-18 NOTE — DISCHARGE INSTR - COC
Continuity of Care Form    Patient Name: Sophie Awad   :  1945  MRN:  [id number]    Admit date:  2025  Discharge date:  ***    Code Status Order: Full Code   Advance Directives:     Admitting Physician:  Dinesh Faust DO  PCP: Ian Davis MD    Discharging Nurse: ***  Discharging Hospital Unit/Room#: 0616/0616-A  Discharging Unit Phone Number: [phone]    Emergency Contact:   Extended Emergency Contact Information  Primary Emergency Contact: Tim Awad  Address: [address] [place] [id number] [facility]  Home Phone: [phone]  Mobile Phone: [phone]  Relation: Child  Secondary Emergency Contact: Joe Cruz  Home Phone: [phone]  Mobile Phone: [phone]  Relation: Child    Past Surgical History:  Past Surgical History:   Procedure Laterality Date    APPENDECTOMY      BRAIN ANEURYSM SURGERY      8     CHOLECYSTECTOMY  2016    cholelithias    INCONTINENCE SURGERY         Immunization History:   Immunization History   Administered Date(s) Administered    COVID-19, PFIZER PURPLE top, DILUTE for use, (age 12 y+), 30mcg/0.3mL 2021, 2021    Influenza Vaccine, unspecified formulation 10/15/2013    Influenza Virus Vaccine 02/15/2016    Influenza, FLUAD, (age 65 y+), IM, Quadv, 0.5mL 2020, 2021    Influenza, FLUAD, (age 65 y+), IM, Trivalent PF, 0.5mL 2019    Influenza, FLUZONE High Dose, (age 65 y+), IM, Trivalent PF, 0.5mL 2017    PPD Test 2021    Pneumococcal, PPSV23, PNEUMOVAX 23, (age 2y+), SC/IM, 0.5mL 2017       Active Problems:  Patient Active Problem List   Diagnosis Code    Hypokalemia E87.6    Hypertension I10    Lupus [id number]    Acute cystitis with hematuria N30.01    Lumbar radiculopathy M54.16    History of cerebral aneurysm repair Z98.890, Z86.79    Mild dementia (HCC) F03.A0    Anomalies of cerebrovascular system, 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

## 2025-07-18 NOTE — PLAN OF CARE
Problem: Discharge Planning  Goal: Discharge to home or other facility with appropriate resources  Outcome: Progressing  Flowsheets (Taken 7/18/2025 0800)  Discharge to home or other facility with appropriate resources:   Identify barriers to discharge with patient and caregiver   Arrange for needed discharge resources and transportation as appropriate   Identify discharge learning needs (meds, wound care, etc)     Problem: Skin/Tissue Integrity  Goal: Skin integrity remains intact  Description: 1.  Monitor for areas of redness and/or skin breakdown  2.  Assess vascular access sites hourly  3.  Every 4-6 hours minimum:  Change oxygen saturation probe site  4.  Every 4-6 hours:  If on nasal continuous positive airway pressure, respiratory therapy assess nares and determine need for appliance change or resting period  Outcome: Progressing  Flowsheets (Taken 7/18/2025 0800)  Skin Integrity Remains Intact:   Monitor for areas of redness and/or skin breakdown   Assess vascular access sites hourly     Problem: Safety - Adult  Goal: Free from fall injury  Outcome: Progressing  Flowsheets (Taken 7/18/2025 0800)  Free From Fall Injury:   Instruct family/caregiver on patient safety   Based on caregiver fall risk screen, instruct family/caregiver to ask for assistance with transferring infant if caregiver noted to have fall risk factors     Problem: Pain  Goal: Verbalizes/displays adequate comfort level or baseline comfort level  Outcome: Progressing

## 2025-07-18 NOTE — PROGRESS NOTES
New consult sent to Dr. Snyder, per her \"this does not require an inpatient consult.\" Kira Morales NP notified

## 2025-07-18 NOTE — ACP (ADVANCE CARE PLANNING)
Advance Care Planning   Healthcare Decision Maker:    Primary Decision Maker: Tim Awad - Child - [phone]    Secondary Decision Maker: AnthonyJoe - Child - [phone]    Click here to complete Healthcare Decision Makers including selection of the Healthcare Decision Maker Relationship (ie \"Primary\").  Today we documented Decision Maker(s) consistent with Legal Next of Kin hierarchy.

## 2025-07-18 NOTE — PROGRESS NOTES
Call placed to [facility] to cancel transport as discharge is being held per ID.

## 2025-07-18 NOTE — PROGRESS NOTES
Occupational Therapy    OCCUPATIONAL THERAPY INITIAL EVALUATION    [facility]   [address] [place], [place]         Date:2025                                                  Patient Name: Sophie Awad    MRN: [id number]    : 1945    Room: [address] [place] [address]      Evaluating OT: Sameera Keenan OTR/L   [id number]      Referring Provider:Kira Morales APRN - CNP     Specific Provider Orders/Date:OT eval and treat 2025      Diagnosis:  Altered mental status [R41.82]     Pertinent Medical History: brain aneurysm ,lupus     Precautions:  Fall Risk,      Assessment of current deficits    [x] Functional mobility  [x]ADLs  [x] Strength               [x]Cognition    [x] Functional transfers   [x] IADLs         [x] Safety Awareness   [x]Endurance    [] Fine Coordination              [x] Balance      [] Vision/perception   []Sensation     []Gross Motor Coordination  [] ROM  [] Delirium                   [] Motor Control     OT PLAN OF CARE   OT POC based on physician orders, patient diagnosis and results of clinical assessment    Frequency/Duration  1-3 days/wk  PRN   Specific OT Treatment Interventions to include:   ADL retraining/adapted techniques and AE recommendations to increase functional independence within precautions                    Energy conservation techniques to improve tolerance for selfcare routine   Functional transfer/mobility training/DME recommendations for increased independence, safety and fall prevention         Patient/family education to increase safety and functional independence             Environmental modifications for safe mobility and completion of ADLs                             Therapeutic activity to improve functional performance during ADLs.                                         Therapeutic exercise to improve tolerance and functional strength for ADLs    Balance retraining/tolerance tasks for facilitation of postural  control with dynamic challenges during ADLs .      Positioning to improve functional independence       Recommended Adaptive Equipment: TBD     SOCIAL:  patient questionable historian   Per notes : patient from SNF    Pain Level: no pain observed this session ;   Cognition: A&O: to person  - unsure of place or year.   At times - verbalizations limited then patient conversing with no difficulty    Memory:  impaired    Sequencing:  fair    Problem solving:  fair -   Judgement/safety:  fair-     Functional Assessment:  AM-PAC Daily Activity Raw Score: 14/24   Initial Eval Status  Date: 7/18/2025 Treatment Status  Date: STGs = LTGs  Time frame: 10-14 days   Feeding SBA/set-up   Supervision    Grooming Min A  Standing at sink washing hands  SBA    UB Dressing Gown soiled - assist with changing gown   SBA    LB Dressing Max A  Don/doff socks  Min A    Bathing Mod A   Min A    Toileting Patient able to assist with hygiene while seated on commode   SBA   Bed Mobility  Min A  Supine <> sit  Supervision    Functional Transfers Min A  Sit- stand from bed, commode   CGA/SBA    Functional Mobility Min A, HHA  Ambulated to/from bathroom   CGA   Balance Sitting:     Static:  supervision     Dynamic:SBA   Standing: Min A   SBA/supervision    Activity Tolerance No SOB observed   Fair +  with ADL activity    Visual/  Perceptual Glasses: none by bedside         UE ROM/strength  AROM present throughout   Tolerate UE therapeutic activity/exercises to increase strength/endurance for ADL/xfer activity       Hand Dominance right    Hearing: WFL   Sensation:  No c/o numbness or tingling   Tone: WFL   Edema: none observed     Comments: Upon arrival patient lying in bed .  At end of session, patient returned to bed  with call light and phone within reach, all lines and tubes intact.  *ALARM ON     Overall patient demonstrated  decreased independence and safety during completion of ADL/functional transfer/mobility tasks.  Pt would benefit from

## 2025-07-18 NOTE — CONSULTS
[facility]  [facility]  Consultation Note     Admit Date: 7/17/2025  5:01 PM    Reason for Consult:   Sepsis    Attending Physician:  Dinesh Faust DO    HISTORY OF PRESENT ILLNESS:             The history is obtained from extensive review of available past medical records. The patient is a 79 y.o. female who is known to the ID service.   Patient presented to [facility] from her skilled nursing facility for fatigue and altered mental status.  It was reported that patient was more fatigued and less interactive than her normal.  Supposedly patient ran a fever at [facility] and it was decided that patient presents to the emergency room for further evaluation.  Patient is a poor historian.  Patient denies any fevers, chills, sweats, nausea, vomiting, diarrhea, shortness of breath, cough, abdominal pain or burning with urination.  Patient does report some left lower quadrant abdominal tenderness.  Patient reports she has not had a bowel movement in quite some time.  Upon arrival, patient was febrile with a temperature of 101.5.  WBC within normal limits.  Urine analysis showed 0-5 WBC with culture pending.  Blood cultures have been negative so far and RVP negative.  CT abdomen and pelvis showed nonobstructing left renal calculus with constipation.  Patient is currently receiving IV Ceftriaxone.  ID was consulted for further recommendations.    Past Medical History:      February 2021.  Admitted to [facility] for left hip pain with a concern of septic arthritis versus muscle tear.  Patient went down to IR but there was not enough fluid to aspirate and there was no effusion noted on the ultrasound.  Nuc med bone scan showed bilateral trochanteric bursitis.  Patient was followed off antibiotics and antibiotics were not recommended on discharge as it was felt that since she had bilateral involvement it was less likely to be an infectious process.        Diagnosis Date    Arthritis

## 2025-07-18 NOTE — H&P
Internal Medicine History & Physical     Name: Sophie Awad  : 1945  Chief Complaint: Fatigue (Son states she has a hx of kidney stones and stroke), Generalized Body Aches, and Fever  Primary Care Physician: Ian Davis MD  Admission date: 2025  Date of service: 2025     History of Present Illness  Sophie is a 79 y.o. year old female with a past medical history of hypertension, hyperlipidemia, lupus and brain aneurysm s/p clipping.  She presented to the ER from the SNF with complaints of fatigue and altered mental status that began just prior to arrival.  She reportedly had been less interactive and more fatigued, sleeping a lot.  There was no reported nausea or vomiting.  No reported pain or discomfort.  She hadn't been eating or drinking well and reportedly had a fever of 100 °F at the SNF.  Her symptoms were constant and moderate in severity, nothing particular seem to make it better or worse.  Given the change from her baseline son requested her be sent to the ER for further management.  Upon arrival to the ER she was noted to be somewhat hypertensive and febrile with a temperature of 101.5 °F.  Lab work was obtained and noted sodium 139, potassium 3.3, BUN 13, creatinine 0.6, WBC 10.2, hemoglobin 14.1, alkaline phosphatase 237, ALT 32 and AST 56.  Viral respiratory panel was negative.  UA noted pyuria.  CT of the abdomen/pelvis was also obtained and noted nonobstructing left renal calculi and evidence of constipation.  CT also noted concerns for pelvic insufficiency.  She was given IV fluids, Rocephin/Flagyl and admitted for further management.    Currently she is seen lying in bed asleep, in no distress.  She does easily awaken to voice.  She is pleasant this morning.  She is able to speak some but primarily nods head yes/no to questions.  She denies any pain or discomfort.  She denies any shortness of breath or difficulty breathing.  She denies any headaches, dizziness or  Bacteruria  UA noted, culture is pending  Currently on IV Rocephin  ID consulted for further recommendations    Fever  Noted on presentation to ER  Viral respiratory panel negative  UA noted, culture is pending  Continues on IV Rocephin for now   ID consulted for further recommendations    Pelvic Insufficiency  Noted on CT abdomen/pelvis  Gynecology consulted -- declined consult as this can be managed as outpatient    Constipation  Noted on CT abdomen/pelvis  Add bowel regimen this AM    Nephrolithiasis  Noted on CT abdomen/pelvis and non-obstructive  Consider Urology evaluation    Dementia without Behavioral Disturbances  Not on maintenance medications  Continue supportive care  Monitor mentation -- seems to be at baseline    Hx Brain Aneurysm  S/p aneurysm clipping in the 1980's    Generalized Weakness  PT AM-PAC-- TBD  OT AM-PAC-- TBD   following for discharge planning -- back to SNF    Continue home medications.  Follow labs  DVT prophylaxis.  Please see orders for further management and care.   for discharge planning  Discharge plan: back to SNF soon -- possibly later today pending ID input    The pertinent details of this case were discussed with Dr. Faust.    Melody Omalley, APRN - CNP   7/18/2025  7:19 AM    NOTE:  This report was transcribed using voice recognition software.  Every effort was made to ensure accuracy; however, inadvertent computerized transcription errors may be present.    Addendum: I have personally participated in a face-to-face history and physical exam on the date of service with the patient. I have discussed the case with the nurse practitioner. I also participated in medical decision making on the date of service and I agree with all of the pertinent clinical information unless indicated in my editing of the note. I have reviewed and edited the note above based on my findings during my history, exam, and decision making on the same day of service.     My

## 2025-07-18 NOTE — PROGRESS NOTES
Physical Therapy  Facility/Department: [address] MED SURG  Physical Therapy Initial Assessment    Name: Sophie Awad  : 1945  MRN: [id number]  Date of Service: 2025        Patient Diagnosis(es): There were no encounter diagnoses.  Past Medical History:  has a past medical history of Arthritis, Brain aneurysm, Hypertension, and Lupus.  Past Surgical History:  has a past surgical history that includes Appendectomy; Incontinence surgery; Cholecystectomy (2016); and Brain aneurysm surgery.      Evaluating Therapist: Martina Landry PT    Room #:  0616/0616-A  Diagnosis:  Altered mental status [R41.82]  PMHx/PSHx:  Arthritis, back pain, HTN,, Lupus  Precautions:  falls, alarm      Social: Per chart pt admitted from SNF. Pt states she walks without a walker.    Initial Evaluation  Date: 25 Treatment      Short Term/ Long Term   Goals   Was pt agreeable to Eval/treatment? yes     Does pt have pain? No c/o pain     Bed Mobility  Rolling: min assist  Supine to sit: min assist  Sit to supine: min assist  Scooting: min assist  SBA   Transfers Sit to stand: min aist  Stand to sit: min assist  Stand pivot: min assist  SBA   Ambulation    25 feet x2 with no device with min/hand held assist  100 feet with ww as needed with SBA   Stair Negotiation  Ascended and descended  NT   N/A   LE strength     Grossly 3/5    3+/5   balance      fair     AM-PAC Raw score               16/24         Pt is alert and Oriented to person. Word finding difficulties at times.   LE ROM: WFL  Edema: none  Endurance: fair       ASSESSMENT:    Pt displays functional ability as noted in the objective portion of this evaluation.      Comments:  Pt impulsive. Pt unsteady with ambulation, min assist for balance may benefit from ww.     Pt's/ family goals   1. Pt unable to sate    Conditions Requiring Skilled Therapeutic Intervention:    [x]Decreased strength     []Decreased ROM  [x]Decreased functional mobility  [x]Decreased balance

## 2025-07-18 NOTE — CARE COORDINATION
Internal Medicine On-call Care Coordination Note    I was called by the ED physician because they recommended admission for this patient and we cover their PCP.  The history as I understand it after discussion with the ED physician is as follows:    AMS, fevers, fatigue  101.5  UA-RBCs  CT abdomen- ?underlying pelvic insufficiency and close clinical correlation  Family not happy with nursing facility- wants replaced    I placed admission orders.  Including:    General admission orders  Reconciled home meds  GYN consult  IV rocephin and flagyl  PT/OT eval  SW consult     Dr. Faust, or our coverage will see the patient tomorrow for H&P.    Electronically signed by SANDRA Crawford CNP on 7/17/2025 at 9:41 PM

## 2025-07-18 NOTE — CARE COORDINATION
Patient is from [facility], plan is to return when medically stable. NISHI initiated envelope with demos and transport form in chart. Admitted for AMS. Introduced my self and provided explanation of CM role to patient, she is pleasantly confuse. CM attempted to speak with patients Tim martinez at number on file, no answer, left message. Discharge order noted. Patient is set up with [facility] stretcher today between 5-7pm. Facility notified via careport, nursing notified, vm left for patients Tim martinez.  Macrina PALACIOS, RN  Care Management     Addendum: CM received return phone call from patients Tim martinez not wanting patient to return to [facility], CM explained that we are unable to change facilities, he asked that I speak with his brother Joe. CM placed call to Joe, whom voiced same concerns. CM explained that patient doesn't meet medical criteria for inpatient and we cannot change facilities. CM offered to call liaison and have DON reach out to patients family to address concerns and assist with facility transfer. CM left vm with liaison Nallely.  Macrina PALACIOS, RN  Care Management

## 2025-07-18 NOTE — PROGRESS NOTES
Spiritual Health History and Assessment/Progress Note  [facility]    Initial Encounter, Attempted Encounter,  ,  ,      Name: Sophie Awad MRN: [id number]    Age: 79 y.o.     Sex: female   Language: English   Catholic: Gnosticist   Altered mental status     Date: 7/18/2025                           Spiritual Assessment began in [address] MED SURG        Referral/Consult From: Rounding   Encounter Overview/Reason: Initial Encounter, Attempted Encounter  Service Provided For: Patient, Patient not available    Landy, Belief, Meaning:   Patient unable to assess at this time  Family/Friends No family/friends present      Importance and Influence:  Patient unable to assess at this time  Family/Friends No family/friends present    Community:  Patient is connected with a spiritual community and feels well-supported. Support system includes: Children and Extended family  Family/Friends No family/friends present    Assessment and Plan of Care:     Patient Interventions include: Other: Nursing with the patient, prayer silently offered for the patient and a prayer card was left in the room.  Family/Friends Interventions include: No family/friends present    Patient Plan of Care: Spiritual Care available upon further referral  Family/Friends Plan of Care: No family/friends present    Electronically signed by Chaplain Jessica on 7/18/2025 at 12:03 PM     no

## 2025-07-18 NOTE — ED PROVIDER NOTES
Department of Emergency Medicine     Written by: Hudson Castillo MD  Patient Name: Sophie Awad  Visit Date: 2025  5:01 PM  MRN: [id number]                   : 1945  ------------------------- CC-------------------------  Chief Complaint   Patient presents with    Fatigue     Son states she has a hx of kidney stones and stroke    Generalized Body Aches    Fever     ------------------------- HPI -------------------------    Sophie is a 79 y.o. year old female with history of dementia, hypertension, lupus, arthritis, brain aneurysm, presents from [place] for fatigue and fever.  Patient history of kidney stones and has been complaining and pointing to her abdomen, baseline ANO x 1, however able to answer questions regarding what hurts. At baseline mentation prior to departure from SNF.     Patient is holding her stomach, reports it hurts, denies all other symptoms including chest pain, shortness of breath, lightheadedness, dizziness.     There are no family/friends  at bedside. History is provided by EMS/EMR/patient.    Nursing notes were all reviewed and agreed with or any disagreements were addressed in the HPI.    REVIEW OF SYSTEMS:  Review of Systems:  Could not accurate assess ROS.     ------------------------ LAST CHART REVIEW------------------------    On 2025, patient was seen at [facility] for lower abdominal pain, UA showed RBCs, CBC/CMP unremarkable and was discharged to facility.   ------------------------- PAST MEDICAL HISORY-------------------------  I personally reviewed the following history:    Past Medical History:  has a past medical history of Arthritis, Brain aneurysm, Hypertension, and Lupus.    Past Surgical History:  has a past surgical history that includes Appendectomy; Incontinence surgery; Cholecystectomy (2016); and Brain aneurysm surgery.    Social History:  reports that she quit smoking about 39 years ago. Her smoking use included cigarettes. She started smoking  administration in time range)   melatonin tablet 3 mg (has no administration in time range)   tamsulosin (FLOMAX) capsule 0.4 mg (0.4 mg Oral Given 7/18/25 1009)   ferrous sulfate (IRON 325) tablet 325 mg ( Oral Automatically Held 7/22/25 0900)   ascorbic acid (VITAMIN C) tablet 500 mg ( Oral Automatically Held 7/21/25 0900)   docusate sodium (COLACE) capsule 100 mg (100 mg Oral Given 7/18/25 1009)   senna (SENOKOT) tablet 8.6 mg (has no administration in time range)   cefepime (MAXIPIME) 1,000 mg in sodium chloride 0.9 % 50 mL IVPB (1,000 mg IntraVENous New Bag 7/18/25 1539)   dexAMETHasone (DECADRON) IntraVENous 10 mg (10 mg IntraVENous Given 7/17/25 1759)   acetaminophen (TYLENOL) tablet 650 mg (650 mg Oral Given 7/17/25 1755)   diphenhydrAMINE (BENADRYL) tablet 25 mg (25 mg Oral Given 7/17/25 1755)   iopamidol (ISOVUE-370) 76 % injection 75 mL (75 mLs IntraVENous Given 7/17/25 1850)   cefTRIAXone (ROCEPHIN) 1,000 mg in sterile water 10 mL IV syringe (1,000 mg IntraVENous Given 7/17/25 1935)   potassium chloride 10 mEq/100 mL IVPB (Peripheral Line) (0 mEq IntraVENous Stopped 7/17/25 2157)   metroNIDAZOLE (FLAGYL) 500 mg in 0.9% NaCl 100 mL IVPB premix (0 mg IntraVENous Stopped 7/17/25 2042)     CONSULTS: discussion with bolded \"IP consult\", otherwise consult was likely placed by admitting service  IP CONSULT TO INFECTIOUS DISEASES  IP CONSULT TO PRIMARY CARE PROVIDER  IP CONSULT TO OB GYN  IP CONSULT TO SOCIAL WORK    PROCEDURES: Unless otherwise listed below, none    SOCIAL DETERMINANTS: Patient demonstrates limited health literacy, impacting their ability to understand medical conditions and treatment recommendations.     The patient’s baseline cognitive impairment and dependence on a care facility for daily needs highlight significant social determinants of health, including limited health literacy, reliance on caregivers, and potential barriers to timely recognition of medical

## 2025-07-18 NOTE — ED NOTES
ED to Inpatient Handoff Report    Notified Chelsey on floor that electronic handoff available and patient ready for transport to room 616.    Safety Risks: None identified    Patient in Restraints: no    Constant Observer or Patient : no    Telemetry Monitoring Ordered: Yes          Order to transfer to unit without monitor: NA    Last MEWS: 1 Time completed: 2209         Vitals:    07/17/25 1940 07/17/25 2052 07/17/25 2140 07/17/25 2209   BP: 128/86 128/81 130/79 137/82   Pulse:  77 65 64   Resp:  18 17 16   Temp:    97.7 °F (36.5 °C)   TempSrc:    Oral   SpO2:  93% 91% 94%   Weight:       Height:           Opportunity for questions and clarification was provided.

## 2025-07-19 LAB
ALBUMIN SERPL-MCNC: 2.7 G/DL (ref 3.5–5.2)
ALP SERPL-CCNC: 183 U/L (ref 35–104)
ALT SERPL-CCNC: 26 U/L (ref 0–35)
ANION GAP SERPL CALCULATED.3IONS-SCNC: 11 MMOL/L (ref 7–16)
AST SERPL-CCNC: 41 U/L (ref 0–35)
BASOPHILS # BLD: 0.03 K/UL (ref 0–0.2)
BASOPHILS NFR BLD: 0 % (ref 0–2)
BILIRUB SERPL-MCNC: 0.6 MG/DL (ref 0–1.2)
BUN SERPL-MCNC: 18 MG/DL (ref 8–23)
CALCIUM SERPL-MCNC: 9.9 MG/DL (ref 8.8–10.2)
CHLORIDE SERPL-SCNC: 108 MMOL/L (ref 98–107)
CO2 SERPL-SCNC: 24 MMOL/L (ref 22–29)
CREAT SERPL-MCNC: 0.6 MG/DL (ref 0.5–1)
EOSINOPHIL # BLD: 0.1 K/UL (ref 0.05–0.5)
EOSINOPHILS RELATIVE PERCENT: 1 % (ref 0–6)
ERYTHROCYTE [DISTWIDTH] IN BLOOD BY AUTOMATED COUNT: 13.1 % (ref 11.5–15)
GFR, ESTIMATED: >90 ML/MIN/1.73M2
GLUCOSE SERPL-MCNC: 87 MG/DL (ref 74–99)
HCT VFR BLD AUTO: 39.8 % (ref 34–48)
HGB BLD-MCNC: 12.4 G/DL (ref 11.5–15.5)
IMM GRANULOCYTES # BLD AUTO: <0.03 K/UL (ref 0–0.58)
IMM GRANULOCYTES NFR BLD: 0 % (ref 0–5)
LYMPHOCYTES NFR BLD: 3.05 K/UL (ref 1.5–4)
LYMPHOCYTES RELATIVE PERCENT: 35 % (ref 20–42)
MAGNESIUM SERPL-MCNC: 2 MG/DL (ref 1.6–2.4)
MCH RBC QN AUTO: 30.5 PG (ref 26–35)
MCHC RBC AUTO-ENTMCNC: 31.2 G/DL (ref 32–34.5)
MCV RBC AUTO: 97.8 FL (ref 80–99.9)
MONOCYTES NFR BLD: 0.76 K/UL (ref 0.1–0.95)
MONOCYTES NFR BLD: 9 % (ref 2–12)
NEUTROPHILS NFR BLD: 55 % (ref 43–80)
NEUTS SEG NFR BLD: 4.87 K/UL (ref 1.8–7.3)
PLATELET # BLD AUTO: 266 K/UL (ref 130–450)
PMV BLD AUTO: 9.5 FL (ref 7–12)
POTASSIUM SERPL-SCNC: 3.4 MMOL/L (ref 3.5–5.1)
PROT SERPL-MCNC: 7.8 G/DL (ref 6.4–8.3)
RBC # BLD AUTO: 4.07 M/UL (ref 3.5–5.5)
SODIUM SERPL-SCNC: 142 MMOL/L (ref 136–145)
WBC OTHER # BLD: 8.8 K/UL (ref 4.5–11.5)

## 2025-07-19 PROCEDURE — 85025 COMPLETE CBC W/AUTO DIFF WBC: CPT

## 2025-07-19 PROCEDURE — 83735 ASSAY OF MAGNESIUM: CPT

## 2025-07-19 PROCEDURE — 6370000000 HC RX 637 (ALT 250 FOR IP): Performed by: NURSE PRACTITIONER

## 2025-07-19 PROCEDURE — 80053 COMPREHEN METABOLIC PANEL: CPT

## 2025-07-19 PROCEDURE — 96372 THER/PROPH/DIAG INJ SC/IM: CPT

## 2025-07-19 PROCEDURE — 96366 THER/PROPH/DIAG IV INF ADDON: CPT

## 2025-07-19 PROCEDURE — G0378 HOSPITAL OBSERVATION PER HR: HCPCS

## 2025-07-19 PROCEDURE — 6360000002 HC RX W HCPCS: Performed by: NURSE PRACTITIONER

## 2025-07-19 PROCEDURE — 2580000003 HC RX 258

## 2025-07-19 PROCEDURE — 6360000002 HC RX W HCPCS

## 2025-07-19 PROCEDURE — 2500000003 HC RX 250 WO HCPCS: Performed by: NURSE PRACTITIONER

## 2025-07-19 RX ADMIN — CEFEPIME 1000 MG: 1 INJECTION, POWDER, FOR SOLUTION INTRAMUSCULAR; INTRAVENOUS at 13:44

## 2025-07-19 RX ADMIN — CEFEPIME 1000 MG: 1 INJECTION, POWDER, FOR SOLUTION INTRAMUSCULAR; INTRAVENOUS at 01:20

## 2025-07-19 RX ADMIN — DOCUSATE SODIUM 100 MG: 100 CAPSULE, LIQUID FILLED ORAL at 20:28

## 2025-07-19 RX ADMIN — ENOXAPARIN SODIUM 40 MG: 100 INJECTION SUBCUTANEOUS at 09:40

## 2025-07-19 RX ADMIN — TAMSULOSIN HYDROCHLORIDE 0.4 MG: 0.4 CAPSULE ORAL at 09:40

## 2025-07-19 RX ADMIN — METOPROLOL SUCCINATE 25 MG: 25 TABLET, EXTENDED RELEASE ORAL at 09:40

## 2025-07-19 RX ADMIN — DICYCLOMINE HYDROCHLORIDE 20 MG: 10 CAPSULE ORAL at 09:40

## 2025-07-19 RX ADMIN — DOCUSATE SODIUM 100 MG: 100 CAPSULE, LIQUID FILLED ORAL at 09:40

## 2025-07-19 RX ADMIN — CLOPIDOGREL BISULFATE 75 MG: 75 TABLET, FILM COATED ORAL at 09:40

## 2025-07-19 RX ADMIN — SODIUM CHLORIDE, PRESERVATIVE FREE 10 ML: 5 INJECTION INTRAVENOUS at 20:28

## 2025-07-19 RX ADMIN — SENNOSIDES 8.6 MG: 8.6 TABLET, COATED ORAL at 20:28

## 2025-07-19 RX ADMIN — DICYCLOMINE HYDROCHLORIDE 20 MG: 10 CAPSULE ORAL at 20:28

## 2025-07-19 RX ADMIN — DICYCLOMINE HYDROCHLORIDE 20 MG: 10 CAPSULE ORAL at 13:44

## 2025-07-19 RX ADMIN — POTASSIUM BICARBONATE 40 MEQ: 782 TABLET, EFFERVESCENT ORAL at 06:41

## 2025-07-19 NOTE — PLAN OF CARE
Problem: Discharge Planning  Goal: Discharge to home or other facility with appropriate resources  7/18/2025 2322 by Oni Hughes RN  Outcome: Progressing  7/18/2025 1545 by Abby De La Garza RN  Outcome: Progressing  Flowsheets (Taken 7/18/2025 0800)  Discharge to home or other facility with appropriate resources:   Identify barriers to discharge with patient and caregiver   Arrange for needed discharge resources and transportation as appropriate   Identify discharge learning needs (meds, wound care, etc)     Problem: Skin/Tissue Integrity  Goal: Skin integrity remains intact  Description: 1.  Monitor for areas of redness and/or skin breakdown  2.  Assess vascular access sites hourly  3.  Every 4-6 hours minimum:  Change oxygen saturation probe site  4.  Every 4-6 hours:  If on nasal continuous positive airway pressure, respiratory therapy assess nares and determine need for appliance change or resting period  7/18/2025 2322 by Oni Hughes RN  Outcome: Progressing  7/18/2025 1545 by Abby De La Garza RN  Outcome: Progressing  Flowsheets (Taken 7/18/2025 0800)  Skin Integrity Remains Intact:   Monitor for areas of redness and/or skin breakdown   Assess vascular access sites hourly     Problem: Safety - Adult  Goal: Free from fall injury  7/18/2025 2322 by Oni Hughes RN  Outcome: Progressing  7/18/2025 1545 by Abby De La Garza RN  Outcome: Progressing  Flowsheets (Taken 7/18/2025 0800)  Free From Fall Injury:   Instruct family/caregiver on patient safety   Based on caregiver fall risk screen, instruct family/caregiver to ask for assistance with transferring infant if caregiver noted to have fall risk factors     Problem: Pain  Goal: Verbalizes/displays adequate comfort level or baseline comfort level  7/18/2025 2322 by Oni Hughes RN  Outcome: Progressing  7/18/2025 1545 by Abby De La Garza RN  Outcome: Progressing

## 2025-07-19 NOTE — PLAN OF CARE
Problem: Discharge Planning  Goal: Discharge to home or other facility with appropriate resources  7/19/2025 1154 by Lorne Wren RN  Outcome: Progressing  7/18/2025 2322 by Oni Hughes RN  Outcome: Progressing     Problem: Skin/Tissue Integrity  Goal: Skin integrity remains intact  Description: 1.  Monitor for areas of redness and/or skin breakdown  2.  Assess vascular access sites hourly  3.  Every 4-6 hours minimum:  Change oxygen saturation probe site  4.  Every 4-6 hours:  If on nasal continuous positive airway pressure, respiratory therapy assess nares and determine need for appliance change or resting period  7/19/2025 1154 by Lorne Wren, RN  Outcome: Progressing  7/18/2025 2322 by Oni Hughes RN  Outcome: Progressing     Problem: Safety - Adult  Goal: Free from fall injury  7/19/2025 1154 by Lorne Wren, RN  Outcome: Progressing  7/18/2025 2322 by Oni Hughes RN  Outcome: Progressing     Problem: Pain  Goal: Verbalizes/displays adequate comfort level or baseline comfort level  7/19/2025 1154 by Lorne Wren, RN  Outcome: Progressing  7/18/2025 2322 by Oni Hughes RN  Outcome: Progressing

## 2025-07-19 NOTE — PROGRESS NOTES
[facility]  [facility]  Progress Note    SUBJECTIVE:  Chief Complaint   Patient presents with    Fatigue     Son states she has a hx of kidney stones and stroke    Generalized Body Aches    Fever     Patient is tolerating medications. No reported adverse drug reactions.  No nausea, vomiting, diarrhea.  Confused  Resting in bed  Set up for lunch    Review of systems:  As stated above in the chief complaint, otherwise negative.    Medications:  Scheduled Meds:   clopidogrel  75 mg Oral Daily    dicyclomine  20 mg Oral TID    metoprolol succinate  25 mg Oral Daily    sodium chloride flush  10 mL IntraVENous 2 times per day    enoxaparin  40 mg SubCUTAneous Daily    tamsulosin  0.4 mg Oral Daily    [Held by provider] ferrous sulfate  325 mg Oral Q48H    [Held by provider] ascorbic acid  500 mg Oral Daily    docusate sodium  100 mg Oral BID    senna  1 tablet Oral Nightly    cefepime  1,000 mg IntraVENous Q12H     Continuous Infusions:   sodium chloride       PRN Meds:sodium chloride flush, sodium chloride, potassium chloride **OR** potassium alternative oral replacement **OR** potassium chloride, magnesium sulfate, ondansetron **OR** ondansetron, senna, acetaminophen **OR** acetaminophen, melatonin    OBJECTIVE:  BP (!) 143/88   Pulse 62   Temp 97.9 °F (36.6 °C) (Oral)   Resp 18   Ht 1.676 m (5' 6\")   Wt 59.5 kg (131 lb 2.8 oz)   SpO2 95%   BMI 21.17 kg/m²   Temp  Av.9 °F (36.6 °C)  Min: 97.7 °F (36.5 °C)  Max: 98.2 °F (36.8 °C)  Constitutional: The patient is awake, alert, and pleasantly confused  Skin: Warm and dry. No rashes were noted.   HEENT: Round and reactive pupils.  Moist mucous membranes.  No ulcerations or thrush.  Neck: Supple to movements.   Chest: No use of accessory muscles to breathe. Symmetrical expansion.  No wheezing, crackles or rhonchi.  Cardiovascular: S1 and S2 are rhythmic and regular. No murmurs appreciated.   Abdomen: Positive bowel sounds to

## 2025-07-19 NOTE — PROGRESS NOTES
Internal Medicine Progress Note    Patient's name: Sophie Awad  : 1945  Admission date: 2025  Date of service: 2025   Room: [address]  Primary care physician: Ian Davis MD    Subjective  Sophie was seen and examined at bedside. She is alert and pleasant, but confused. She tells me that she feels \"lousy,\" but only responds \"I don't know\" to the remainder of my questions.    Review of Systems  There are no new complaints of chest pain, shortness of breath, abdominal pain, nausea, vomiting, diarrhea, constipation.    Hospital Medications  Current Facility-Administered Medications   Medication Dose Route Frequency Provider Last Rate Last Admin    clopidogrel (PLAVIX) tablet 75 mg  75 mg Oral Daily Kira Morales APRN - CNP   75 mg at 25 0940    dicyclomine (BENTYL) capsule 20 mg  20 mg Oral TID Kira Morales APRN - CNP   20 mg at 25 0940    metoprolol succinate (TOPROL XL) extended release tablet 25 mg  25 mg Oral Daily LacivKira betancur APRN - CNP   25 mg at 25 0940    sodium chloride flush 0.9 % injection 10 mL  10 mL IntraVENous 2 times per day Kira Morales APRN - CNP   10 mL at 25    sodium chloride flush 0.9 % injection 10 mL  10 mL IntraVENous PRN Kira Morales APRN - CNP        0.9 % sodium chloride infusion   IntraVENous PRN LacKira lemons APRN - CNP        potassium chloride (KLOR-CON M) extended release tablet 40 mEq  40 mEq Oral PRN Kira Morales APRN - CNP        Or    potassium bicarb-citric acid (EFFER-K) effervescent tablet 40 mEq  40 mEq Oral PRN Kira Morales APRN - CNP   40 mEq at 25 0641    Or    potassium chloride 10 mEq/100 mL IVPB (Peripheral Line)  10 mEq IntraVENous PRN Kira Morales APRN - CNP        magnesium sulfate 2000 mg in 50 mL IVPB premix  2,000 mg IntraVENous PRN Kira Morales APRN - CNP        enoxaparin (LOVENOX) injection 40 mg  40 mg SubCUTAneous Daily Lacivita, Kira, APRN -  medications  Continue supportive care  Monitor mentation -- seems to be at baseline    Hx Brain Aneurysm  S/p aneurysm clipping in the 1980's    Generalized Weakness  PT AM-PAC-- 16/24  OT AM-PAC-- 14/24   following for discharge planning -- back to SNF    Details of the case discussed with Dr. Alston  Electronically signed by SANDRA Bell CNP on 7/19/2025 at 10:19 AM

## 2025-07-20 VITALS
BODY MASS INDEX: 21.47 KG/M2 | HEART RATE: 59 BPM | DIASTOLIC BLOOD PRESSURE: 87 MMHG | SYSTOLIC BLOOD PRESSURE: 170 MMHG | HEIGHT: 66 IN | RESPIRATION RATE: 18 BRPM | TEMPERATURE: 98.1 F | OXYGEN SATURATION: 95 % | WEIGHT: 133.6 LBS

## 2025-07-20 PROBLEM — N39.0 COMPLICATED UTI (URINARY TRACT INFECTION): Status: ACTIVE | Noted: 2025-07-20

## 2025-07-20 LAB
ALBUMIN SERPL-MCNC: 2.9 G/DL (ref 3.5–5.2)
ALP SERPL-CCNC: 192 U/L (ref 35–104)
ALT SERPL-CCNC: 26 U/L (ref 0–35)
ANION GAP SERPL CALCULATED.3IONS-SCNC: 10 MMOL/L (ref 7–16)
AST SERPL-CCNC: 43 U/L (ref 0–35)
BASOPHILS # BLD: 0.05 K/UL (ref 0–0.2)
BASOPHILS NFR BLD: 1 % (ref 0–2)
BILIRUB SERPL-MCNC: 0.6 MG/DL (ref 0–1.2)
BUN SERPL-MCNC: 18 MG/DL (ref 8–23)
CALCIUM SERPL-MCNC: 9.7 MG/DL (ref 8.8–10.2)
CHLORIDE SERPL-SCNC: 105 MMOL/L (ref 98–107)
CO2 SERPL-SCNC: 25 MMOL/L (ref 22–29)
CREAT SERPL-MCNC: 0.7 MG/DL (ref 0.5–1)
EOSINOPHIL # BLD: 0.33 K/UL (ref 0.05–0.5)
EOSINOPHILS RELATIVE PERCENT: 5 % (ref 0–6)
ERYTHROCYTE [DISTWIDTH] IN BLOOD BY AUTOMATED COUNT: 13.2 % (ref 11.5–15)
GFR, ESTIMATED: >90 ML/MIN/1.73M2
GLUCOSE SERPL-MCNC: 83 MG/DL (ref 74–99)
HCT VFR BLD AUTO: 41.3 % (ref 34–48)
HGB BLD-MCNC: 12.8 G/DL (ref 11.5–15.5)
IMM GRANULOCYTES # BLD AUTO: 0.03 K/UL (ref 0–0.58)
IMM GRANULOCYTES NFR BLD: 0 % (ref 0–5)
LYMPHOCYTES NFR BLD: 2.49 K/UL (ref 1.5–4)
LYMPHOCYTES RELATIVE PERCENT: 35 % (ref 20–42)
MCH RBC QN AUTO: 30.1 PG (ref 26–35)
MCHC RBC AUTO-ENTMCNC: 31 G/DL (ref 32–34.5)
MCV RBC AUTO: 97.2 FL (ref 80–99.9)
MICROORGANISM SPEC CULT: NORMAL
MICROORGANISM SPEC CULT: NORMAL
MONOCYTES NFR BLD: 0.8 K/UL (ref 0.1–0.95)
MONOCYTES NFR BLD: 11 % (ref 2–12)
NEUTROPHILS NFR BLD: 49 % (ref 43–80)
NEUTS SEG NFR BLD: 3.5 K/UL (ref 1.8–7.3)
PLATELET # BLD AUTO: 278 K/UL (ref 130–450)
PMV BLD AUTO: 9.2 FL (ref 7–12)
POTASSIUM SERPL-SCNC: 3.7 MMOL/L (ref 3.5–5.1)
PROT SERPL-MCNC: 8.1 G/DL (ref 6.4–8.3)
RBC # BLD AUTO: 4.25 M/UL (ref 3.5–5.5)
SERVICE CMNT-IMP: NORMAL
SERVICE CMNT-IMP: NORMAL
SODIUM SERPL-SCNC: 140 MMOL/L (ref 136–145)
SPECIMEN DESCRIPTION: NORMAL
SPECIMEN DESCRIPTION: NORMAL
WBC OTHER # BLD: 7.2 K/UL (ref 4.5–11.5)

## 2025-07-20 PROCEDURE — 6360000002 HC RX W HCPCS

## 2025-07-20 PROCEDURE — 80053 COMPREHEN METABOLIC PANEL: CPT

## 2025-07-20 PROCEDURE — 2500000003 HC RX 250 WO HCPCS: Performed by: NURSE PRACTITIONER

## 2025-07-20 PROCEDURE — 96372 THER/PROPH/DIAG INJ SC/IM: CPT

## 2025-07-20 PROCEDURE — 6360000002 HC RX W HCPCS: Performed by: NURSE PRACTITIONER

## 2025-07-20 PROCEDURE — 85025 COMPLETE CBC W/AUTO DIFF WBC: CPT

## 2025-07-20 PROCEDURE — 6370000000 HC RX 637 (ALT 250 FOR IP): Performed by: NURSE PRACTITIONER

## 2025-07-20 PROCEDURE — 2580000003 HC RX 258

## 2025-07-20 PROCEDURE — 2060000000 HC ICU INTERMEDIATE R&B

## 2025-07-20 PROCEDURE — G0378 HOSPITAL OBSERVATION PER HR: HCPCS

## 2025-07-20 RX ADMIN — SENNOSIDES 8.6 MG: 8.6 TABLET, COATED ORAL at 21:39

## 2025-07-20 RX ADMIN — DICYCLOMINE HYDROCHLORIDE 20 MG: 10 CAPSULE ORAL at 21:39

## 2025-07-20 RX ADMIN — SODIUM CHLORIDE, PRESERVATIVE FREE 10 ML: 5 INJECTION INTRAVENOUS at 21:40

## 2025-07-20 RX ADMIN — CEFEPIME 1000 MG: 1 INJECTION, POWDER, FOR SOLUTION INTRAMUSCULAR; INTRAVENOUS at 13:59

## 2025-07-20 RX ADMIN — DOCUSATE SODIUM 100 MG: 100 CAPSULE, LIQUID FILLED ORAL at 21:39

## 2025-07-20 RX ADMIN — ENOXAPARIN SODIUM 40 MG: 100 INJECTION SUBCUTANEOUS at 09:35

## 2025-07-20 RX ADMIN — CEFEPIME 1000 MG: 1 INJECTION, POWDER, FOR SOLUTION INTRAMUSCULAR; INTRAVENOUS at 04:37

## 2025-07-20 RX ADMIN — SODIUM CHLORIDE, PRESERVATIVE FREE 10 ML: 5 INJECTION INTRAVENOUS at 09:35

## 2025-07-20 RX ADMIN — DICYCLOMINE HYDROCHLORIDE 20 MG: 10 CAPSULE ORAL at 15:56

## 2025-07-20 RX ADMIN — CLOPIDOGREL BISULFATE 75 MG: 75 TABLET, FILM COATED ORAL at 09:35

## 2025-07-20 RX ADMIN — TAMSULOSIN HYDROCHLORIDE 0.4 MG: 0.4 CAPSULE ORAL at 09:35

## 2025-07-20 RX ADMIN — METOPROLOL SUCCINATE 25 MG: 25 TABLET, EXTENDED RELEASE ORAL at 09:35

## 2025-07-20 RX ADMIN — DICYCLOMINE HYDROCHLORIDE 20 MG: 10 CAPSULE ORAL at 09:35

## 2025-07-20 RX ADMIN — DOCUSATE SODIUM 100 MG: 100 CAPSULE, LIQUID FILLED ORAL at 09:35

## 2025-07-20 ASSESSMENT — PAIN SCALES - GENERAL: PAINLEVEL_OUTOF10: 0

## 2025-07-20 NOTE — PROGRESS NOTES
Benign to palpation. No masses felt. No hepatosplenomegaly.  Extremities: No clubbing, no cyanosis, no edema.  Lines: Peripheral.    Laboratory and Tests:  Lab Results   Component Value Date    CRP 45.5 (H) 07/18/2025    CRP 4.0 (H) 02/15/2021    CRP 1.7 (H) 06/18/2015     Lab Results   Component Value Date    SEDRATE 110 (H) 07/18/2025    SEDRATE 80 (H) 02/15/2021       Radiology:      Microbiology:   Blood culture 7/17/2025: No growth so far  Urine culture 7/17/2025: No growth  RVP 7/17/2025: Negative    ASSESSMENT:  Constipation  Possible complicated UTI  Fevers associated to the above-resolved    PLAN:  Continue Cefepime day 3  Check final cultures-remain neg  Monitor labs    SANDRA Mireles - CNP  8:21 AM  7/20/2025   Pt seen and examined. Above discussed agree with advanced practice nurse. Labs, cultures, and radiographs reviewed.  Face to Face encounter occurred. Changes made as necessary.     Janes Solano MD

## 2025-07-20 NOTE — PLAN OF CARE
Problem: Discharge Planning  Goal: Discharge to home or other facility with appropriate resources  7/20/2025 1018 by Lorne Wren, RN  Outcome: Progressing  7/19/2025 2224 by Lizet Mims, RN  Outcome: Progressing     Problem: Skin/Tissue Integrity  Goal: Skin integrity remains intact  Description: 1.  Monitor for areas of redness and/or skin breakdown  2.  Assess vascular access sites hourly  3.  Every 4-6 hours minimum:  Change oxygen saturation probe site  4.  Every 4-6 hours:  If on nasal continuous positive airway pressure, respiratory therapy assess nares and determine need for appliance change or resting period  7/20/2025 1018 by Lorne Wren, RN  Outcome: Progressing  7/19/2025 2224 by Lizet Mims, RN  Outcome: Progressing     Problem: Safety - Adult  Goal: Free from fall injury  7/20/2025 1018 by Lorne Wren, RN  Outcome: Progressing  7/19/2025 2224 by Lizet Mims, RN  Outcome: Progressing     Problem: Pain  Goal: Verbalizes/displays adequate comfort level or baseline comfort level  7/20/2025 1018 by Lorne Wren, RN  Outcome: Progressing  7/19/2025 2224 by Lizet Mims, RN  Outcome: Progressing

## 2025-07-20 NOTE — PLAN OF CARE
Problem: Discharge Planning  Goal: Discharge to home or other facility with appropriate resources  7/19/2025 2224 by Lizet Mims RN  Outcome: Progressing  7/19/2025 2224 by Lizet Mims RN  Outcome: Progressing  7/19/2025 1154 by Lorne Wren, RN  Outcome: Progressing     Problem: Skin/Tissue Integrity  Goal: Skin integrity remains intact  Description: 1.  Monitor for areas of redness and/or skin breakdown  2.  Assess vascular access sites hourly  3.  Every 4-6 hours minimum:  Change oxygen saturation probe site  4.  Every 4-6 hours:  If on nasal continuous positive airway pressure, respiratory therapy assess nares and determine need for appliance change or resting period  7/19/2025 2224 by Lizet Mims RN  Outcome: Progressing  7/19/2025 2224 by Lizet Mims RN  Outcome: Progressing  7/19/2025 1154 by Lorne Wren, RN  Outcome: Progressing     Problem: Safety - Adult  Goal: Free from fall injury  7/19/2025 2224 by Lizet Mims RN  Outcome: Progressing  7/19/2025 2224 by Lizet Mims RN  Outcome: Progressing  7/19/2025 1154 by Lorne Wren, RN  Outcome: Progressing     Problem: Pain  Goal: Verbalizes/displays adequate comfort level or baseline comfort level  7/19/2025 2224 by Lizet Mims RN  Outcome: Progressing  7/19/2025 2224 by Lizet Mims, RN  Outcome: Progressing  7/19/2025 1154 by Lorne Wren, RN  Outcome: Progressing

## 2025-07-20 NOTE — PROGRESS NOTES
Internal Medicine Progress Note    Patient's name: Sophie Awad  : 1945  Admission date: 2025  Date of service: 2025   Room: [address] SURG  Primary care physician: Ian Davis MD    Subjective  Sophie was in bed, awake, alert, pleasant but confused. She denies any pain or discomfort. Tolerating antibiotic.      Nursing denies any acute concerns.    Review of Systems  There are no new complaints of chest pain, shortness of breath, abdominal pain, nausea, vomiting, diarrhea, constipation.    Hospital Medications  Current Facility-Administered Medications   Medication Dose Route Frequency Provider Last Rate Last Admin    clopidogrel (PLAVIX) tablet 75 mg  75 mg Oral Daily Kira Morales APRN - CNP   75 mg at 25 0935    dicyclomine (BENTYL) capsule 20 mg  20 mg Oral TID Kira Morales APRN - CNP   20 mg at 25 0935    metoprolol succinate (TOPROL XL) extended release tablet 25 mg  25 mg Oral Daily LacivKira betancur APRN - CNP   25 mg at 25 0935    sodium chloride flush 0.9 % injection 10 mL  10 mL IntraVENous 2 times per day LacivKira betancur APRN - CNP   10 mL at 25 0935    sodium chloride flush 0.9 % injection 10 mL  10 mL IntraVENous PRN LacivKira betancur, APRN - CNP        0.9 % sodium chloride infusion   IntraVENous PRN LacivKira betancur, APRN - CNP        potassium chloride (KLOR-CON M) extended release tablet 40 mEq  40 mEq Oral PRN LacivKira betancur APRN - CNP        Or    potassium bicarb-citric acid (EFFER-K) effervescent tablet 40 mEq  40 mEq Oral PRN LacivKira betancur, APRN - CNP   40 mEq at 25 0641    Or    potassium chloride 10 mEq/100 mL IVPB (Peripheral Line)  10 mEq IntraVENous PRN Kira Morales APRN - CNP        magnesium sulfate 2000 mg in 50 mL IVPB premix  2,000 mg IntraVENous PRN LacivitaKira APRN - CNP        enoxaparin (LOVENOX) injection 40 mg  40 mg SubCUTAneous Daily BobivKira betancur APRN - CNP   40 mg at 25 0935

## 2025-07-21 LAB
ALBUMIN SERPL-MCNC: 3 G/DL (ref 3.5–5.2)
ALP SERPL-CCNC: 231 U/L (ref 35–104)
ALT SERPL-CCNC: 29 U/L (ref 0–35)
ANION GAP SERPL CALCULATED.3IONS-SCNC: 9 MMOL/L (ref 7–16)
AST SERPL-CCNC: 47 U/L (ref 0–35)
BASOPHILS # BLD: 0.05 K/UL (ref 0–0.2)
BASOPHILS NFR BLD: 1 % (ref 0–2)
BILIRUB SERPL-MCNC: 0.7 MG/DL (ref 0–1.2)
BUN SERPL-MCNC: 13 MG/DL (ref 8–23)
CALCIUM SERPL-MCNC: 9.5 MG/DL (ref 8.8–10.2)
CHLORIDE SERPL-SCNC: 105 MMOL/L (ref 98–107)
CO2 SERPL-SCNC: 24 MMOL/L (ref 22–29)
CREAT SERPL-MCNC: 0.6 MG/DL (ref 0.5–1)
EOSINOPHIL # BLD: 0.34 K/UL (ref 0.05–0.5)
EOSINOPHILS RELATIVE PERCENT: 5 % (ref 0–6)
ERYTHROCYTE [DISTWIDTH] IN BLOOD BY AUTOMATED COUNT: 13.1 % (ref 11.5–15)
GFR, ESTIMATED: >90 ML/MIN/1.73M2
GLUCOSE SERPL-MCNC: 93 MG/DL (ref 74–99)
HCT VFR BLD AUTO: 43.3 % (ref 34–48)
HGB BLD-MCNC: 13.9 G/DL (ref 11.5–15.5)
IMM GRANULOCYTES # BLD AUTO: 0.03 K/UL (ref 0–0.58)
IMM GRANULOCYTES NFR BLD: 0 % (ref 0–5)
LYMPHOCYTES NFR BLD: 2.51 K/UL (ref 1.5–4)
LYMPHOCYTES RELATIVE PERCENT: 36 % (ref 20–42)
MAGNESIUM SERPL-MCNC: 2 MG/DL (ref 1.6–2.4)
MCH RBC QN AUTO: 30.5 PG (ref 26–35)
MCHC RBC AUTO-ENTMCNC: 32.1 G/DL (ref 32–34.5)
MCV RBC AUTO: 95.2 FL (ref 80–99.9)
MONOCYTES NFR BLD: 0.75 K/UL (ref 0.1–0.95)
MONOCYTES NFR BLD: 11 % (ref 2–12)
NEUTROPHILS NFR BLD: 48 % (ref 43–80)
NEUTS SEG NFR BLD: 3.36 K/UL (ref 1.8–7.3)
PLATELET # BLD AUTO: 303 K/UL (ref 130–450)
PMV BLD AUTO: 8.9 FL (ref 7–12)
POTASSIUM SERPL-SCNC: 3.5 MMOL/L (ref 3.5–5.1)
PROT SERPL-MCNC: 8.2 G/DL (ref 6.4–8.3)
RBC # BLD AUTO: 4.55 M/UL (ref 3.5–5.5)
SODIUM SERPL-SCNC: 138 MMOL/L (ref 136–145)
WBC OTHER # BLD: 7 K/UL (ref 4.5–11.5)

## 2025-07-21 PROCEDURE — 2580000003 HC RX 258

## 2025-07-21 PROCEDURE — 80053 COMPREHEN METABOLIC PANEL: CPT

## 2025-07-21 PROCEDURE — 36415 COLL VENOUS BLD VENIPUNCTURE: CPT

## 2025-07-21 PROCEDURE — 6370000000 HC RX 637 (ALT 250 FOR IP): Performed by: NURSE PRACTITIONER

## 2025-07-21 PROCEDURE — 83735 ASSAY OF MAGNESIUM: CPT

## 2025-07-21 PROCEDURE — 85025 COMPLETE CBC W/AUTO DIFF WBC: CPT

## 2025-07-21 PROCEDURE — 2500000003 HC RX 250 WO HCPCS: Performed by: NURSE PRACTITIONER

## 2025-07-21 PROCEDURE — 6360000002 HC RX W HCPCS: Performed by: NURSE PRACTITIONER

## 2025-07-21 PROCEDURE — 2060000000 HC ICU INTERMEDIATE R&B

## 2025-07-21 PROCEDURE — 6360000002 HC RX W HCPCS

## 2025-07-21 RX ADMIN — SODIUM CHLORIDE, PRESERVATIVE FREE 10 ML: 5 INJECTION INTRAVENOUS at 09:16

## 2025-07-21 RX ADMIN — DICYCLOMINE HYDROCHLORIDE 20 MG: 10 CAPSULE ORAL at 21:29

## 2025-07-21 RX ADMIN — SENNOSIDES 8.6 MG: 8.6 TABLET, COATED ORAL at 21:29

## 2025-07-21 RX ADMIN — CEFEPIME 1000 MG: 1 INJECTION, POWDER, FOR SOLUTION INTRAMUSCULAR; INTRAVENOUS at 01:38

## 2025-07-21 RX ADMIN — DOCUSATE SODIUM 100 MG: 100 CAPSULE, LIQUID FILLED ORAL at 09:16

## 2025-07-21 RX ADMIN — SODIUM CHLORIDE, PRESERVATIVE FREE 10 ML: 5 INJECTION INTRAVENOUS at 21:29

## 2025-07-21 RX ADMIN — DOCUSATE SODIUM 100 MG: 100 CAPSULE, LIQUID FILLED ORAL at 21:29

## 2025-07-21 RX ADMIN — CLOPIDOGREL BISULFATE 75 MG: 75 TABLET, FILM COATED ORAL at 09:17

## 2025-07-21 RX ADMIN — METOPROLOL SUCCINATE 25 MG: 25 TABLET, EXTENDED RELEASE ORAL at 09:16

## 2025-07-21 RX ADMIN — ENOXAPARIN SODIUM 40 MG: 100 INJECTION SUBCUTANEOUS at 09:16

## 2025-07-21 RX ADMIN — DICYCLOMINE HYDROCHLORIDE 20 MG: 10 CAPSULE ORAL at 09:16

## 2025-07-21 RX ADMIN — DICYCLOMINE HYDROCHLORIDE 20 MG: 10 CAPSULE ORAL at 14:10

## 2025-07-21 RX ADMIN — TAMSULOSIN HYDROCHLORIDE 0.4 MG: 0.4 CAPSULE ORAL at 09:16

## 2025-07-21 RX ADMIN — CEFEPIME 1000 MG: 1 INJECTION, POWDER, FOR SOLUTION INTRAMUSCULAR; INTRAVENOUS at 14:13

## 2025-07-21 NOTE — PLAN OF CARE
Problem: Discharge Planning  Goal: Discharge to home or other facility with appropriate resources  7/21/2025 1235 by Lorne Wren RN  Outcome: Progressing  7/21/2025 0116 by Gina Barraza RN  Outcome: Progressing     Problem: Skin/Tissue Integrity  Goal: Skin integrity remains intact  Description: 1.  Monitor for areas of redness and/or skin breakdown  2.  Assess vascular access sites hourly  3.  Every 4-6 hours minimum:  Change oxygen saturation probe site  4.  Every 4-6 hours:  If on nasal continuous positive airway pressure, respiratory therapy assess nares and determine need for appliance change or resting period  7/21/2025 1235 by Lorne Wren, RN  Outcome: Progressing  7/21/2025 0116 by Gina Barraza RN  Outcome: Progressing     Problem: Safety - Adult  Goal: Free from fall injury  7/21/2025 1235 by Lorne Wren, RN  Outcome: Progressing  7/21/2025 0116 by Gina Barraza RN  Outcome: Progressing     Problem: Pain  Goal: Verbalizes/displays adequate comfort level or baseline comfort level  7/21/2025 1235 by Lorne Wren, RN  Outcome: Progressing  7/21/2025 0116 by Gina Barraza RN  Outcome: Progressing

## 2025-07-21 NOTE — DISCHARGE SUMMARY
Internal Medicine Discharge Summary    NAME: Sophie Awad :  1945  MRN:  [id number] PCP:Ian Davis MD    ADMITTED: 2025   DISCHARGED: 2025 11:29 AM    ADMITTING PHYSICIAN: Dinesh Faust DO    PCP: Ian Davis MD    CONSULTANT(S):   IP CONSULT TO INFECTIOUS DISEASES  IP CONSULT TO PRIMARY CARE PROVIDER  IP CONSULT TO OB GYN  IP CONSULT TO SOCIAL WORK     ADMITTING DIAGNOSIS:   Altered mental status [R41.82]  Complicated UTI (urinary tract infection) [N39.0]     Please see H&P for further details    DISCHARGE DIAGNOSES:   Active Hospital Problems    Diagnosis     Complicated UTI (urinary tract infection) [N39.0]     Altered mental status [R41.82]     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 female patient of Ian Davis MD who  has a past medical history of Arthritis, Brain aneurysm, Hypertension, and Lupus. who originally had concerns including Fatigue (Son states she has a hx of kidney stones and stroke), Generalized Body Aches, and Fever. at presentation on 2025, and was found to have Altered mental status [R41.82]  Complicated UTI (urinary tract infection) [N39.0] after workup.    Please see H&P for further details.    HOSPITAL COURSE:   The patient presented to the hospital with the chief complaint of Fatigue (Son states she has a hx of kidney stones and stroke), Generalized Body Aches, and Fever  . The patient was admitted to the hospital.     Complicated UTI in the setting of Nephrolithiasis  Likely etiology of the fever on presentation  CT abdomen/pelvis noted  UA noted, culture with no growth  Continue IV Cefepime as per ID -- will complete   ID consult appreciated     Pelvic Insufficiency  Noted on CT abdomen/pelvis  Gynecology consulted -- declined consult as this can be managed as outpatient     Constipation  Noted on CT abdomen/pelvis  Continue bowel

## 2025-07-21 NOTE — PROGRESS NOTES
Internal Medicine Progress Note    Patient's name: Sophie Awad  : 1945  Chief complaints (on day of admission): Fatigue (Son states she has a hx of kidney stones and stroke), Generalized Body Aches, and Fever  Admission date: 2025  Date of service: 2025   Room: [address]  Primary care physician: Ian Davis MD  Reason for visit: Follow-up for UTI    Subjective  Sophie is seen lying in bed asleep, in no distress. She does easily awaken to voice. She is very pleasant this morning. She expresses that she feels well this morning. Denies any pain or discomfort. Denies any shortness of breath or difficulty breathing. She seems to be tolerating antibiotics well -- awaiting final recommendations from ID for discharge. No other issues or concerns from nursing.    Review of Systems  Full 10 point review of systems negative unless mentioned above.    Hospital Medications  Current Facility-Administered Medications   Medication Dose Route Frequency Provider Last Rate Last Admin    clopidogrel (PLAVIX) tablet 75 mg  75 mg Oral Daily Kira Morales APRN - CNP   75 mg at 25 0935    dicyclomine (BENTYL) capsule 20 mg  20 mg Oral TID Kira Morales APRN - CNP   20 mg at 25 213    metoprolol succinate (TOPROL XL) extended release tablet 25 mg  25 mg Oral Daily LacivKira betancur, APRN - CNP   25 mg at 25 0935    sodium chloride flush 0.9 % injection 10 mL  10 mL IntraVENous 2 times per day Kira Morales APRN - CNP   10 mL at 25 214    sodium chloride flush 0.9 % injection 10 mL  10 mL IntraVENous PRN Kira Morales, APRN - CNP        0.9 % sodium chloride infusion   IntraVENous PRN Kira Morales APRN - CNP        potassium chloride (KLOR-CON M) extended release tablet 40 mEq  40 mEq Oral PRN LacivitaKira, APRN - CNP        Or    potassium bicarb-citric acid (EFFER-K) effervescent tablet 40 mEq  40 mEq Oral PRN LacivKira betancur APRN - CNP   40 mEq at

## 2025-07-21 NOTE — PROGRESS NOTES
auscultation. Benign to palpation. No masses felt. No hepatosplenomegaly.  Extremities: No clubbing, no cyanosis, no edema.  Lines: Peripheral.    Laboratory and Tests:  Lab Results   Component Value Date    CRP 45.5 (H) 07/18/2025    CRP 4.0 (H) 02/15/2021    CRP 1.7 (H) 06/18/2015     Lab Results   Component Value Date    SEDRATE 110 (H) 07/18/2025    SEDRATE 80 (H) 02/15/2021       Radiology:      Microbiology:   Blood culture 7/17/2025: No growth so far  Urine culture 7/17/2025: No growth  RVP 7/17/2025: Negative    ASSESSMENT:  Constipation  Possible complicated UTI  Fevers associated to the above    PLAN:  Continue Cefepime (day 4)  Check final cultures  Monitor labs    Dipika Brian, APRN - CNP  11:17 AM  7/21/2025     Patient seen and examined. I had a face to face encounter with the patient. Agree with exam.  Assessment and plan as outlined above and directed by me. Addition and corrections were done as deemed appropriate. My exam and plan include: The patient feels good.  Denies any complaints.  She is tolerating antibiotics.  No dysuria.  Cefepime can be discontinued after tomorrow's morning dose.    Cr Griffin MD  7/21/2025  2:53 PM

## 2025-07-22 VITALS
HEIGHT: 66 IN | DIASTOLIC BLOOD PRESSURE: 112 MMHG | WEIGHT: 133.6 LBS | RESPIRATION RATE: 16 BRPM | OXYGEN SATURATION: 98 % | SYSTOLIC BLOOD PRESSURE: 160 MMHG | HEART RATE: 74 BPM | TEMPERATURE: 99.5 F | BODY MASS INDEX: 21.47 KG/M2

## 2025-07-22 LAB
ALBUMIN SERPL-MCNC: 3 G/DL (ref 3.5–5.2)
ALP SERPL-CCNC: 243 U/L (ref 35–104)
ALT SERPL-CCNC: 36 U/L (ref 0–35)
ANION GAP SERPL CALCULATED.3IONS-SCNC: 10 MMOL/L (ref 7–16)
AST SERPL-CCNC: 55 U/L (ref 0–35)
BASOPHILS # BLD: 0.03 K/UL (ref 0–0.2)
BASOPHILS NFR BLD: 0 % (ref 0–2)
BILIRUB SERPL-MCNC: 0.7 MG/DL (ref 0–1.2)
BUN SERPL-MCNC: 14 MG/DL (ref 8–23)
CALCIUM SERPL-MCNC: 9.9 MG/DL (ref 8.8–10.2)
CHLORIDE SERPL-SCNC: 105 MMOL/L (ref 98–107)
CO2 SERPL-SCNC: 24 MMOL/L (ref 22–29)
CREAT SERPL-MCNC: 0.6 MG/DL (ref 0.5–1)
EOSINOPHIL # BLD: 0.41 K/UL (ref 0.05–0.5)
EOSINOPHILS RELATIVE PERCENT: 5 % (ref 0–6)
ERYTHROCYTE [DISTWIDTH] IN BLOOD BY AUTOMATED COUNT: 13.2 % (ref 11.5–15)
GFR, ESTIMATED: >90 ML/MIN/1.73M2
GLUCOSE SERPL-MCNC: 98 MG/DL (ref 74–99)
HCT VFR BLD AUTO: 42.4 % (ref 34–48)
HGB BLD-MCNC: 13.7 G/DL (ref 11.5–15.5)
IMM GRANULOCYTES # BLD AUTO: 0.06 K/UL (ref 0–0.58)
IMM GRANULOCYTES NFR BLD: 1 % (ref 0–5)
LYMPHOCYTES NFR BLD: 2.05 K/UL (ref 1.5–4)
LYMPHOCYTES RELATIVE PERCENT: 24 % (ref 20–42)
MCH RBC QN AUTO: 30.9 PG (ref 26–35)
MCHC RBC AUTO-ENTMCNC: 32.3 G/DL (ref 32–34.5)
MCV RBC AUTO: 95.7 FL (ref 80–99.9)
MICROORGANISM SPEC CULT: NORMAL
MICROORGANISM SPEC CULT: NORMAL
MONOCYTES NFR BLD: 0.94 K/UL (ref 0.1–0.95)
MONOCYTES NFR BLD: 11 % (ref 2–12)
NEUTROPHILS NFR BLD: 59 % (ref 43–80)
NEUTS SEG NFR BLD: 5.04 K/UL (ref 1.8–7.3)
PLATELET # BLD AUTO: 306 K/UL (ref 130–450)
PMV BLD AUTO: 9 FL (ref 7–12)
POTASSIUM SERPL-SCNC: 3.9 MMOL/L (ref 3.5–5.1)
PROT SERPL-MCNC: 8.3 G/DL (ref 6.4–8.3)
RBC # BLD AUTO: 4.43 M/UL (ref 3.5–5.5)
SERVICE CMNT-IMP: NORMAL
SERVICE CMNT-IMP: NORMAL
SODIUM SERPL-SCNC: 139 MMOL/L (ref 136–145)
SPECIMEN DESCRIPTION: NORMAL
SPECIMEN DESCRIPTION: NORMAL
WBC OTHER # BLD: 8.5 K/UL (ref 4.5–11.5)

## 2025-07-22 PROCEDURE — 85025 COMPLETE CBC W/AUTO DIFF WBC: CPT

## 2025-07-22 PROCEDURE — 6370000000 HC RX 637 (ALT 250 FOR IP): Performed by: NURSE PRACTITIONER

## 2025-07-22 PROCEDURE — 2500000003 HC RX 250 WO HCPCS: Performed by: NURSE PRACTITIONER

## 2025-07-22 PROCEDURE — 6360000002 HC RX W HCPCS: Performed by: NURSE PRACTITIONER

## 2025-07-22 PROCEDURE — 6360000002 HC RX W HCPCS: Performed by: REGISTERED NURSE

## 2025-07-22 PROCEDURE — 2580000003 HC RX 258: Performed by: REGISTERED NURSE

## 2025-07-22 PROCEDURE — 36415 COLL VENOUS BLD VENIPUNCTURE: CPT

## 2025-07-22 PROCEDURE — 80053 COMPREHEN METABOLIC PANEL: CPT

## 2025-07-22 RX ADMIN — METOPROLOL SUCCINATE 25 MG: 25 TABLET, EXTENDED RELEASE ORAL at 10:03

## 2025-07-22 RX ADMIN — CEFEPIME 1000 MG: 1 INJECTION, POWDER, FOR SOLUTION INTRAMUSCULAR; INTRAVENOUS at 02:36

## 2025-07-22 RX ADMIN — ENOXAPARIN SODIUM 40 MG: 100 INJECTION SUBCUTANEOUS at 10:11

## 2025-07-22 RX ADMIN — DOCUSATE SODIUM 100 MG: 100 CAPSULE, LIQUID FILLED ORAL at 10:03

## 2025-07-22 RX ADMIN — DICYCLOMINE HYDROCHLORIDE 20 MG: 10 CAPSULE ORAL at 10:02

## 2025-07-22 RX ADMIN — CLOPIDOGREL BISULFATE 75 MG: 75 TABLET, FILM COATED ORAL at 10:02

## 2025-07-22 RX ADMIN — TAMSULOSIN HYDROCHLORIDE 0.4 MG: 0.4 CAPSULE ORAL at 10:03

## 2025-07-22 RX ADMIN — SODIUM CHLORIDE, PRESERVATIVE FREE 10 ML: 5 INJECTION INTRAVENOUS at 10:04

## 2025-07-22 ASSESSMENT — PAIN SCALES - GENERAL: PAINLEVEL_OUTOF10: 0

## 2025-07-22 NOTE — PROGRESS NOTES
Nurse to nurse report called Gary. [phone]. Report given to Emmy. [facility] to pick patient up at 11:00 am.

## 2025-07-22 NOTE — PROGRESS NOTES
[facility]  [facility]  Progress Note    SUBJECTIVE:  Chief Complaint   Patient presents with    Fatigue     Son states she has a hx of kidney stones and stroke    Generalized Body Aches    Fever     Patient is tolerating medications. No reported adverse drug reactions.  No nausea, vomiting, diarrhea.  No fevers  Resting in bed  Pleasantly confused      Review of systems:  As stated above in the chief complaint, otherwise negative.    Medications:  Scheduled Meds:   clopidogrel  75 mg Oral Daily    dicyclomine  20 mg Oral TID    metoprolol succinate  25 mg Oral Daily    sodium chloride flush  10 mL IntraVENous 2 times per day    enoxaparin  40 mg SubCUTAneous Daily    tamsulosin  0.4 mg Oral Daily    [Held by provider] ferrous sulfate  325 mg Oral Q48H    [Held by provider] ascorbic acid  500 mg Oral Daily    docusate sodium  100 mg Oral BID    senna  1 tablet Oral Nightly     Continuous Infusions:   sodium chloride       PRN Meds:sodium chloride flush, sodium chloride, potassium chloride **OR** potassium alternative oral replacement **OR** potassium chloride, magnesium sulfate, ondansetron **OR** ondansetron, senna, acetaminophen **OR** acetaminophen, melatonin    OBJECTIVE:  BP (!) 130/96   Pulse 72   Temp 99.5 °F (37.5 °C) (Oral)   Resp 16   Ht 1.676 m (5' 6\")   Wt 60.6 kg (133 lb 9.6 oz)   SpO2 91%   BMI 21.56 kg/m²   Temp  Av.7 °F (37.1 °C)  Min: 98.1 °F (36.7 °C)  Max: 99.5 °F (37.5 °C)  Constitutional: The patient is awake, alert, and pleasantly confused  Skin: Warm and dry. No rashes were noted.   HEENT: Round and reactive pupils.  Moist mucous membranes.  No ulcerations or thrush.  Neck: Supple to movements.   Chest: No use of accessory muscles to breathe. Symmetrical expansion.  No wheezing, crackles or rhonchi.  Cardiovascular: S1 and S2 are rhythmic and regular. No murmurs appreciated.   Abdomen: Positive bowel sounds to auscultation. Benign to palpation. No

## 2025-07-22 NOTE — CARE COORDINATION
Discharge order noted plan is to return to [facility] via [facility] stretcher between 10:30-12:30pm. Envelope with demos and transport form in chart. Facility notified via careport. Nursing notified. CM phoned  patients son Joe mailbox full unable to leave message.   Macrina DUKESN, RN  Care Management

## 2025-07-22 NOTE — PROGRESS NOTES
Internal Medicine Progress Note    Patient's name: Sophie Awad  : 1945  Chief complaints (on day of admission): Fatigue (Son states she has a hx of kidney stones and stroke), Generalized Body Aches, and Fever  Admission date: 2025  Date of service: 2025   Room: [address]  Primary care physician: Ian Davis MD  Reason for visit: Follow-up for UTI    Subjective  Sophie is seen lying in bed asleep, in no distress.  She does easily awaken to voice.  She is very pleasant this morning.  She denies any pain.  Denies any shortness of breath or difficulty breathing.  She denies any nausea or vomiting.  ID was in yesterday and recommending cefepime be discontinued after this morning's dose.  Plan is for discharge to SNF after she receives her morning dose of antibiotics.  No other issues or concerns from nursing.    Review of Systems  Full 10 point review of systems negative unless mentioned above.    Hospital Medications  Current Facility-Administered Medications   Medication Dose Route Frequency Provider Last Rate Last Admin    clopidogrel (PLAVIX) tablet 75 mg  75 mg Oral Daily Kira Morales APRN - CNP   75 mg at 25 0917    dicyclomine (BENTYL) capsule 20 mg  20 mg Oral TID Kira Morales APRN - CNP   20 mg at 25    metoprolol succinate (TOPROL XL) extended release tablet 25 mg  25 mg Oral Daily Kira Morales APRN - CNP   25 mg at 25 0916    sodium chloride flush 0.9 % injection 10 mL  10 mL IntraVENous 2 times per day Kira Morales APRN - CNP   10 mL at 25    sodium chloride flush 0.9 % injection 10 mL  10 mL IntraVENous PRN Kira Morales APRN - CNP        0.9 % sodium chloride infusion   IntraVENous PRN Kira Morales APRN - CNP        potassium chloride (KLOR-CON M) extended release tablet 40 mEq  40 mEq Oral PRN Kira Morales APRN - CNP        Or    potassium bicarb-citric acid (EFFER-K) effervescent tablet 40 mEq  40 mEq

## 2025-08-08 ENCOUNTER — APPOINTMENT (OUTPATIENT)
Dept: GENERAL RADIOLOGY | Age: 80
End: 2025-08-08
Payer: MEDICAID

## 2025-08-08 ENCOUNTER — HOSPITAL ENCOUNTER (EMERGENCY)
Age: 80
Discharge: HOME | End: 2025-08-08
Attending: EMERGENCY MEDICINE
Payer: MEDICAID

## 2025-08-08 VITALS
OXYGEN SATURATION: 97 % | BODY MASS INDEX: 22.5 KG/M2 | SYSTOLIC BLOOD PRESSURE: 151 MMHG | TEMPERATURE: 98 F | HEIGHT: 66 IN | HEART RATE: 80 BPM | WEIGHT: 140 LBS | RESPIRATION RATE: 18 BRPM | DIASTOLIC BLOOD PRESSURE: 87 MMHG

## 2025-08-08 DIAGNOSIS — R15.1 SOILING: ICD-10-CM

## 2025-08-08 DIAGNOSIS — F03.90 DEMENTIA, UNSPECIFIED DEMENTIA SEVERITY, UNSPECIFIED DEMENTIA TYPE, UNSPECIFIED WHETHER BEHAVIORAL, PSYCHOTIC, OR MOOD DISTURBANCE OR ANXIETY (HCC): Primary | ICD-10-CM

## 2025-08-08 LAB
ALBUMIN SERPL-MCNC: 3.3 G/DL (ref 3.5–5.2)
ALP SERPL-CCNC: 276 U/L (ref 35–104)
ALT SERPL-CCNC: 67 U/L (ref 0–35)
ANION GAP SERPL CALCULATED.3IONS-SCNC: 11 MMOL/L (ref 7–16)
AST SERPL-CCNC: 104 U/L (ref 0–35)
BASOPHILS # BLD: 0.04 K/UL (ref 0–0.2)
BASOPHILS NFR BLD: 0 % (ref 0–2)
BILIRUB DIRECT SERPL-MCNC: 0.1 MG/DL (ref 0–0.2)
BILIRUB INDIRECT SERPL-MCNC: 1.3 MG/DL (ref 0–1)
BILIRUB SERPL-MCNC: 1.4 MG/DL (ref 0–1.2)
BILIRUB UR QL STRIP: NEGATIVE
BUN SERPL-MCNC: 14 MG/DL (ref 8–23)
CALCIUM SERPL-MCNC: 10 MG/DL (ref 8.8–10.2)
CHLORIDE SERPL-SCNC: 102 MMOL/L (ref 98–107)
CK SERPL-CCNC: 64 U/L (ref 0–170)
CLARITY UR: CLEAR
CO2 SERPL-SCNC: 24 MMOL/L (ref 22–29)
COLOR UR: YELLOW
CREAT SERPL-MCNC: 0.6 MG/DL (ref 0.5–1)
EKG ATRIAL RATE: 86 BPM
EKG P AXIS: 27 DEGREES
EKG P-R INTERVAL: 180 MS
EKG Q-T INTERVAL: 354 MS
EKG QRS DURATION: 74 MS
EKG QTC CALCULATION (BAZETT): 423 MS
EKG R AXIS: 73 DEGREES
EKG T AXIS: 31 DEGREES
EKG VENTRICULAR RATE: 86 BPM
EOSINOPHIL # BLD: 0.05 K/UL (ref 0.05–0.5)
EOSINOPHILS RELATIVE PERCENT: 1 % (ref 0–6)
ERYTHROCYTE [DISTWIDTH] IN BLOOD BY AUTOMATED COUNT: 13.4 % (ref 11.5–15)
GFR, ESTIMATED: >90 ML/MIN/1.73M2
GLUCOSE SERPL-MCNC: 111 MG/DL (ref 74–99)
GLUCOSE UR STRIP-MCNC: NEGATIVE MG/DL
HCT VFR BLD AUTO: 43.3 % (ref 34–48)
HGB BLD-MCNC: 13.6 G/DL (ref 11.5–15.5)
HGB UR QL STRIP.AUTO: NEGATIVE
IMM GRANULOCYTES # BLD AUTO: 0.03 K/UL (ref 0–0.58)
IMM GRANULOCYTES NFR BLD: 0 % (ref 0–5)
KETONES UR STRIP-MCNC: NEGATIVE MG/DL
LACTATE BLDV-SCNC: 2 MMOL/L (ref 0.5–1.9)
LEUKOCYTE ESTERASE UR QL STRIP: ABNORMAL
LYMPHOCYTES NFR BLD: 2.59 K/UL (ref 1.5–4)
LYMPHOCYTES RELATIVE PERCENT: 24 % (ref 20–42)
MCH RBC QN AUTO: 30.7 PG (ref 26–35)
MCHC RBC AUTO-ENTMCNC: 31.4 G/DL (ref 32–34.5)
MCV RBC AUTO: 97.7 FL (ref 80–99.9)
MONOCYTES NFR BLD: 1.35 K/UL (ref 0.1–0.95)
MONOCYTES NFR BLD: 12 % (ref 2–12)
NEUTROPHILS NFR BLD: 63 % (ref 43–80)
NEUTS SEG NFR BLD: 6.97 K/UL (ref 1.8–7.3)
NITRITE UR QL STRIP: NEGATIVE
PH UR STRIP: 7 [PH] (ref 5–8)
PLATELET # BLD AUTO: 266 K/UL (ref 130–450)
PMV BLD AUTO: 10.2 FL (ref 7–12)
POTASSIUM SERPL-SCNC: 4.8 MMOL/L (ref 3.5–5.1)
PROT SERPL-MCNC: 9.4 G/DL (ref 6.4–8.3)
PROT UR STRIP-MCNC: NEGATIVE MG/DL
RBC # BLD AUTO: 4.43 M/UL (ref 3.5–5.5)
RBC #/AREA URNS HPF: ABNORMAL /HPF
SODIUM SERPL-SCNC: 137 MMOL/L (ref 136–145)
SP GR UR STRIP: 1.01 (ref 1–1.03)
TROPONIN I SERPL HS-MCNC: 8 NG/L (ref 0–14)
UROBILINOGEN UR STRIP-ACNC: 1 EU/DL (ref 0–1)
WBC #/AREA URNS HPF: ABNORMAL /HPF
WBC OTHER # BLD: 11 K/UL (ref 4.5–11.5)

## 2025-08-08 PROCEDURE — 81001 URINALYSIS AUTO W/SCOPE: CPT

## 2025-08-08 PROCEDURE — 82248 BILIRUBIN DIRECT: CPT

## 2025-08-08 PROCEDURE — 84484 ASSAY OF TROPONIN QUANT: CPT

## 2025-08-08 PROCEDURE — 2580000003 HC RX 258: Performed by: EMERGENCY MEDICINE

## 2025-08-08 PROCEDURE — 82550 ASSAY OF CK (CPK): CPT

## 2025-08-08 PROCEDURE — 99285 EMERGENCY DEPT VISIT HI MDM: CPT

## 2025-08-08 PROCEDURE — 93005 ELECTROCARDIOGRAM TRACING: CPT | Performed by: EMERGENCY MEDICINE

## 2025-08-08 PROCEDURE — 71045 X-RAY EXAM CHEST 1 VIEW: CPT

## 2025-08-08 PROCEDURE — 85025 COMPLETE CBC W/AUTO DIFF WBC: CPT

## 2025-08-08 PROCEDURE — 87040 BLOOD CULTURE FOR BACTERIA: CPT

## 2025-08-08 PROCEDURE — 87086 URINE CULTURE/COLONY COUNT: CPT

## 2025-08-08 PROCEDURE — 83605 ASSAY OF LACTIC ACID: CPT

## 2025-08-08 PROCEDURE — 80053 COMPREHEN METABOLIC PANEL: CPT

## 2025-08-08 RX ORDER — SODIUM CHLORIDE 0.9 % (FLUSH) 0.9 %
10 SYRINGE (ML) INJECTION PRN
Status: DISCONTINUED | OUTPATIENT
Start: 2025-08-08 | End: 2025-08-09 | Stop reason: HOSPADM

## 2025-08-08 RX ORDER — 0.9 % SODIUM CHLORIDE 0.9 %
1000 INTRAVENOUS SOLUTION INTRAVENOUS ONCE
Status: COMPLETED | OUTPATIENT
Start: 2025-08-08 | End: 2025-08-08

## 2025-08-08 RX ADMIN — SODIUM CHLORIDE 1000 ML: 9 INJECTION, SOLUTION INTRAVENOUS at 18:28

## 2025-08-08 ASSESSMENT — ENCOUNTER SYMPTOMS
SHORTNESS OF BREATH: 0
EYE PAIN: 0
WHEEZING: 0
DIARRHEA: 0
EYE REDNESS: 0
BACK PAIN: 0
NAUSEA: 0
ABDOMINAL DISTENTION: 0
SORE THROAT: 0
COUGH: 0
SINUS PRESSURE: 0
VOMITING: 0
EYE DISCHARGE: 0

## 2025-08-09 LAB
MICROORGANISM SPEC CULT: ABNORMAL
SERVICE CMNT-IMP: ABNORMAL
SPECIMEN DESCRIPTION: ABNORMAL

## 2025-08-11 ENCOUNTER — HOSPITAL ENCOUNTER (EMERGENCY)
Age: 80
Discharge: HOME | End: 2025-08-12
Attending: EMERGENCY MEDICINE
Payer: MEDICAID

## 2025-08-11 ENCOUNTER — APPOINTMENT (OUTPATIENT)
Dept: CT IMAGING | Age: 80
End: 2025-08-11
Payer: MEDICAID

## 2025-08-11 DIAGNOSIS — N30.91 CYSTITIS WITH HEMATURIA: Primary | ICD-10-CM

## 2025-08-11 LAB
ALBUMIN SERPL-MCNC: 3.3 G/DL (ref 3.5–5.2)
ALP SERPL-CCNC: 268 U/L (ref 35–104)
ALT SERPL-CCNC: 70 U/L (ref 0–35)
ANION GAP SERPL CALCULATED.3IONS-SCNC: 13 MMOL/L (ref 7–16)
AST SERPL-CCNC: 74 U/L (ref 0–35)
BACTERIA URNS QL MICRO: ABNORMAL
BASOPHILS # BLD: 0.04 K/UL (ref 0–0.2)
BASOPHILS NFR BLD: 0 % (ref 0–2)
BILIRUB SERPL-MCNC: 1 MG/DL (ref 0–1.2)
BILIRUB UR QL STRIP: NEGATIVE
BUN SERPL-MCNC: 24 MG/DL (ref 8–23)
CALCIUM SERPL-MCNC: 10.7 MG/DL (ref 8.8–10.2)
CHLORIDE SERPL-SCNC: 103 MMOL/L (ref 98–107)
CLARITY UR: ABNORMAL
CO2 SERPL-SCNC: 26 MMOL/L (ref 22–29)
COLOR UR: YELLOW
CREAT SERPL-MCNC: 0.7 MG/DL (ref 0.5–1)
EKG ATRIAL RATE: 86 BPM
EKG P AXIS: 27 DEGREES
EKG P-R INTERVAL: 180 MS
EKG Q-T INTERVAL: 354 MS
EKG QRS DURATION: 74 MS
EKG QTC CALCULATION (BAZETT): 423 MS
EKG R AXIS: 73 DEGREES
EKG T AXIS: 31 DEGREES
EKG VENTRICULAR RATE: 86 BPM
EOSINOPHIL # BLD: 0.1 K/UL (ref 0.05–0.5)
EOSINOPHILS RELATIVE PERCENT: 1 % (ref 0–6)
ERYTHROCYTE [DISTWIDTH] IN BLOOD BY AUTOMATED COUNT: 13.3 % (ref 11.5–15)
GFR, ESTIMATED: >90 ML/MIN/1.73M2
GLUCOSE SERPL-MCNC: 107 MG/DL (ref 74–99)
GLUCOSE UR STRIP-MCNC: NEGATIVE MG/DL
HCT VFR BLD AUTO: 45.2 % (ref 34–48)
HGB BLD-MCNC: 14.4 G/DL (ref 11.5–15.5)
HGB UR QL STRIP.AUTO: ABNORMAL
IMM GRANULOCYTES # BLD AUTO: 0.03 K/UL (ref 0–0.58)
IMM GRANULOCYTES NFR BLD: 0 % (ref 0–5)
KETONES UR STRIP-MCNC: ABNORMAL MG/DL
LACTATE BLDV-SCNC: 1.9 MMOL/L (ref 0.5–2.2)
LEUKOCYTE ESTERASE UR QL STRIP: ABNORMAL
LIPASE SERPL-CCNC: 18 U/L (ref 13–60)
LYMPHOCYTES NFR BLD: 2.15 K/UL (ref 1.5–4)
LYMPHOCYTES RELATIVE PERCENT: 21 % (ref 20–42)
MAGNESIUM SERPL-MCNC: 2.3 MG/DL (ref 1.6–2.4)
MCH RBC QN AUTO: 31 PG (ref 26–35)
MCHC RBC AUTO-ENTMCNC: 31.9 G/DL (ref 32–34.5)
MCV RBC AUTO: 97.4 FL (ref 80–99.9)
MONOCYTES NFR BLD: 1.22 K/UL (ref 0.1–0.95)
MONOCYTES NFR BLD: 12 % (ref 2–12)
NEUTROPHILS NFR BLD: 65 % (ref 43–80)
NEUTS SEG NFR BLD: 6.66 K/UL (ref 1.8–7.3)
NITRITE UR QL STRIP: NEGATIVE
PH UR STRIP: 5 [PH] (ref 5–8)
PLATELET # BLD AUTO: 290 K/UL (ref 130–450)
PMV BLD AUTO: 9.8 FL (ref 7–12)
POTASSIUM SERPL-SCNC: 3.8 MMOL/L (ref 3.5–5.1)
PROT SERPL-MCNC: 9.1 G/DL (ref 6.4–8.3)
PROT UR STRIP-MCNC: ABNORMAL MG/DL
RBC # BLD AUTO: 4.64 M/UL (ref 3.5–5.5)
RBC #/AREA URNS HPF: ABNORMAL /HPF
SODIUM SERPL-SCNC: 142 MMOL/L (ref 136–145)
SP GR UR STRIP: 1.02 (ref 1–1.03)
TROPONIN I SERPL HS-MCNC: 8 NG/L (ref 0–14)
UROBILINOGEN UR STRIP-ACNC: 1 EU/DL (ref 0–1)
WBC #/AREA URNS HPF: ABNORMAL /HPF
WBC OTHER # BLD: 10.2 K/UL (ref 4.5–11.5)

## 2025-08-11 PROCEDURE — 83605 ASSAY OF LACTIC ACID: CPT

## 2025-08-11 PROCEDURE — 80053 COMPREHEN METABOLIC PANEL: CPT

## 2025-08-11 PROCEDURE — 83690 ASSAY OF LIPASE: CPT

## 2025-08-11 PROCEDURE — 84484 ASSAY OF TROPONIN QUANT: CPT

## 2025-08-11 PROCEDURE — 87077 CULTURE AEROBIC IDENTIFY: CPT

## 2025-08-11 PROCEDURE — 6360000004 HC RX CONTRAST MEDICATION: Performed by: RADIOLOGY

## 2025-08-11 PROCEDURE — 83735 ASSAY OF MAGNESIUM: CPT

## 2025-08-11 PROCEDURE — 81001 URINALYSIS AUTO W/SCOPE: CPT

## 2025-08-11 PROCEDURE — 87086 URINE CULTURE/COLONY COUNT: CPT

## 2025-08-11 PROCEDURE — 74177 CT ABD & PELVIS W/CONTRAST: CPT

## 2025-08-11 PROCEDURE — 2500000003 HC RX 250 WO HCPCS

## 2025-08-11 PROCEDURE — 96375 TX/PRO/DX INJ NEW DRUG ADDON: CPT

## 2025-08-11 PROCEDURE — 96374 THER/PROPH/DIAG INJ IV PUSH: CPT

## 2025-08-11 PROCEDURE — 93005 ELECTROCARDIOGRAM TRACING: CPT

## 2025-08-11 PROCEDURE — 6360000002 HC RX W HCPCS

## 2025-08-11 PROCEDURE — 70450 CT HEAD/BRAIN W/O DYE: CPT

## 2025-08-11 PROCEDURE — 99285 EMERGENCY DEPT VISIT HI MDM: CPT

## 2025-08-11 PROCEDURE — 93010 ELECTROCARDIOGRAM REPORT: CPT | Performed by: INTERNAL MEDICINE

## 2025-08-11 PROCEDURE — 85025 COMPLETE CBC W/AUTO DIFF WBC: CPT

## 2025-08-11 RX ORDER — DIPHENHYDRAMINE HYDROCHLORIDE 50 MG/ML
25 INJECTION, SOLUTION INTRAMUSCULAR; INTRAVENOUS ONCE
Status: COMPLETED | OUTPATIENT
Start: 2025-08-11 | End: 2025-08-11

## 2025-08-11 RX ORDER — CEFDINIR 300 MG/1
300 CAPSULE ORAL 2 TIMES DAILY
Qty: 14 CAPSULE | Refills: 0 | Status: ON HOLD | OUTPATIENT
Start: 2025-08-11 | End: 2025-08-18

## 2025-08-11 RX ORDER — IOPAMIDOL 755 MG/ML
75 INJECTION, SOLUTION INTRAVASCULAR
Status: COMPLETED | OUTPATIENT
Start: 2025-08-11 | End: 2025-08-11

## 2025-08-11 RX ADMIN — WATER 2000 MG: 1 INJECTION INTRAMUSCULAR; INTRAVENOUS; SUBCUTANEOUS at 18:29

## 2025-08-11 RX ADMIN — DIPHENHYDRAMINE HYDROCHLORIDE 25 MG: 50 INJECTION INTRAMUSCULAR; INTRAVENOUS at 15:58

## 2025-08-11 RX ADMIN — IOPAMIDOL 75 ML: 755 INJECTION, SOLUTION INTRAVENOUS at 18:15

## 2025-08-11 RX ADMIN — WATER 125 MG: 1 INJECTION INTRAMUSCULAR; INTRAVENOUS; SUBCUTANEOUS at 15:58

## 2025-08-11 ASSESSMENT — PAIN - FUNCTIONAL ASSESSMENT: PAIN_FUNCTIONAL_ASSESSMENT: 0-10

## 2025-08-12 VITALS
TEMPERATURE: 98.9 F | SYSTOLIC BLOOD PRESSURE: 173 MMHG | RESPIRATION RATE: 16 BRPM | DIASTOLIC BLOOD PRESSURE: 99 MMHG | HEART RATE: 83 BPM | OXYGEN SATURATION: 96 %

## 2025-08-14 LAB
MICROORGANISM SPEC CULT: ABNORMAL
SERVICE CMNT-IMP: ABNORMAL
SPECIMEN DESCRIPTION: ABNORMAL

## 2025-08-15 LAB
EKG ATRIAL RATE: 81 BPM
EKG P AXIS: 17 DEGREES
EKG P-R INTERVAL: 184 MS
EKG Q-T INTERVAL: 378 MS
EKG QRS DURATION: 84 MS
EKG QTC CALCULATION (BAZETT): 439 MS
EKG R AXIS: -13 DEGREES
EKG T AXIS: -2 DEGREES
EKG VENTRICULAR RATE: 81 BPM

## 2025-08-16 PROCEDURE — 99285 EMERGENCY DEPT VISIT HI MDM: CPT

## 2025-08-17 ENCOUNTER — APPOINTMENT (OUTPATIENT)
Dept: CT IMAGING | Age: 80
DRG: 176 | End: 2025-08-17
Payer: MEDICARE

## 2025-08-17 ENCOUNTER — APPOINTMENT (OUTPATIENT)
Dept: GENERAL RADIOLOGY | Age: 80
DRG: 176 | End: 2025-08-17
Payer: MEDICARE

## 2025-08-17 ENCOUNTER — HOSPITAL ENCOUNTER (INPATIENT)
Age: 80
LOS: 2 days | Discharge: NURSING, REHAB OR LONG-TERM CARE | DRG: 176 | End: 2025-08-19
Attending: STUDENT IN AN ORGANIZED HEALTH CARE EDUCATION/TRAINING PROGRAM | Admitting: INTERNAL MEDICINE
Payer: MEDICARE

## 2025-08-17 DIAGNOSIS — I26.99 ACUTE PULMONARY EMBOLISM WITHOUT ACUTE COR PULMONALE, UNSPECIFIED PULMONARY EMBOLISM TYPE (HCC): Primary | ICD-10-CM

## 2025-08-17 LAB
ALBUMIN SERPL-MCNC: 3.1 G/DL (ref 3.5–5.2)
ALP SERPL-CCNC: 304 U/L (ref 35–104)
ALT SERPL-CCNC: 36 U/L (ref 0–35)
ANION GAP SERPL CALCULATED.3IONS-SCNC: 11 MMOL/L (ref 7–16)
AST SERPL-CCNC: 40 U/L (ref 0–35)
B PARAP IS1001 DNA NPH QL NAA+NON-PROBE: NOT DETECTED
B PERT DNA SPEC QL NAA+PROBE: NOT DETECTED
BACTERIA URNS QL MICRO: ABNORMAL
BASOPHILS # BLD: 0.03 K/UL (ref 0–0.2)
BASOPHILS NFR BLD: 0 % (ref 0–2)
BILIRUB SERPL-MCNC: 0.5 MG/DL (ref 0–1.2)
BILIRUB UR QL STRIP: NEGATIVE
BNP SERPL-MCNC: 79 PG/ML (ref 0–450)
BUN SERPL-MCNC: 12 MG/DL (ref 8–23)
C PNEUM DNA NPH QL NAA+NON-PROBE: NOT DETECTED
CALCIUM SERPL-MCNC: 9.5 MG/DL (ref 8.8–10.2)
CHLORIDE SERPL-SCNC: 106 MMOL/L (ref 98–107)
CLARITY UR: CLEAR
CO2 SERPL-SCNC: 24 MMOL/L (ref 22–29)
COLOR UR: YELLOW
CREAT SERPL-MCNC: 0.7 MG/DL (ref 0.5–1)
CRYSTALS URNS MICRO: ABNORMAL /HPF
D-DIMER QUANTITATIVE: 961 NG/ML DDU (ref 0–230)
EOSINOPHIL # BLD: 0.26 K/UL (ref 0.05–0.5)
EOSINOPHILS RELATIVE PERCENT: 3 % (ref 0–6)
ERYTHROCYTE [DISTWIDTH] IN BLOOD BY AUTOMATED COUNT: 12.6 % (ref 11.5–15)
ERYTHROCYTE [DISTWIDTH] IN BLOOD BY AUTOMATED COUNT: 12.8 % (ref 11.5–15)
FLUAV RNA NPH QL NAA+NON-PROBE: NOT DETECTED
FLUBV RNA NPH QL NAA+NON-PROBE: NOT DETECTED
GFR, ESTIMATED: 90 ML/MIN/1.73M2
GLUCOSE SERPL-MCNC: 124 MG/DL (ref 74–99)
GLUCOSE UR STRIP-MCNC: NEGATIVE MG/DL
HADV DNA NPH QL NAA+NON-PROBE: NOT DETECTED
HCOV 229E RNA NPH QL NAA+NON-PROBE: NOT DETECTED
HCOV HKU1 RNA NPH QL NAA+NON-PROBE: NOT DETECTED
HCOV NL63 RNA NPH QL NAA+NON-PROBE: NOT DETECTED
HCOV OC43 RNA NPH QL NAA+NON-PROBE: NOT DETECTED
HCT VFR BLD AUTO: 38.7 % (ref 34–48)
HCT VFR BLD AUTO: 42.9 % (ref 34–48)
HGB BLD-MCNC: 12.5 G/DL (ref 11.5–15.5)
HGB BLD-MCNC: 13.5 G/DL (ref 11.5–15.5)
HGB UR QL STRIP.AUTO: ABNORMAL
HMPV RNA NPH QL NAA+NON-PROBE: NOT DETECTED
HPIV1 RNA NPH QL NAA+NON-PROBE: NOT DETECTED
HPIV2 RNA NPH QL NAA+NON-PROBE: NOT DETECTED
HPIV3 RNA NPH QL NAA+NON-PROBE: NOT DETECTED
HPIV4 RNA NPH QL NAA+NON-PROBE: NOT DETECTED
IMM GRANULOCYTES # BLD AUTO: 0.05 K/UL (ref 0–0.58)
IMM GRANULOCYTES NFR BLD: 1 % (ref 0–5)
KETONES UR STRIP-MCNC: ABNORMAL MG/DL
LACTATE BLDV-SCNC: 1.8 MMOL/L (ref 0.5–2.2)
LEUKOCYTE ESTERASE UR QL STRIP: NEGATIVE
LYMPHOCYTES NFR BLD: 2.31 K/UL (ref 1.5–4)
LYMPHOCYTES RELATIVE PERCENT: 25 % (ref 20–42)
M PNEUMO DNA NPH QL NAA+NON-PROBE: NOT DETECTED
MAGNESIUM SERPL-MCNC: 2.2 MG/DL (ref 1.6–2.4)
MCH RBC QN AUTO: 30.8 PG (ref 26–35)
MCH RBC QN AUTO: 31 PG (ref 26–35)
MCHC RBC AUTO-ENTMCNC: 31.5 G/DL (ref 32–34.5)
MCHC RBC AUTO-ENTMCNC: 32.3 G/DL (ref 32–34.5)
MCV RBC AUTO: 96 FL (ref 80–99.9)
MCV RBC AUTO: 97.7 FL (ref 80–99.9)
MONOCYTES NFR BLD: 1.17 K/UL (ref 0.1–0.95)
MONOCYTES NFR BLD: 13 % (ref 2–12)
NEUTROPHILS NFR BLD: 59 % (ref 43–80)
NEUTS SEG NFR BLD: 5.49 K/UL (ref 1.8–7.3)
NITRITE UR QL STRIP: NEGATIVE
PARTIAL THROMBOPLASTIN TIME: 126.3 SEC (ref 24.5–35.1)
PARTIAL THROMBOPLASTIN TIME: 151.5 SEC (ref 24.5–35.1)
PARTIAL THROMBOPLASTIN TIME: 40.3 SEC (ref 24.5–35.1)
PARTIAL THROMBOPLASTIN TIME: >240 SEC (ref 24.5–35.1)
PH UR STRIP: 6 [PH] (ref 5–8)
PLATELET # BLD AUTO: 258 K/UL (ref 130–450)
PLATELET # BLD AUTO: 304 K/UL (ref 130–450)
PMV BLD AUTO: 8.9 FL (ref 7–12)
PMV BLD AUTO: 9.3 FL (ref 7–12)
POTASSIUM SERPL-SCNC: 3.1 MMOL/L (ref 3.5–5.1)
PROT SERPL-MCNC: 8.2 G/DL (ref 6.4–8.3)
PROT UR STRIP-MCNC: NEGATIVE MG/DL
RBC # BLD AUTO: 4.03 M/UL (ref 3.5–5.5)
RBC # BLD AUTO: 4.39 M/UL (ref 3.5–5.5)
RBC #/AREA URNS HPF: ABNORMAL /HPF
RSV RNA NPH QL NAA+NON-PROBE: NOT DETECTED
RV+EV RNA NPH QL NAA+NON-PROBE: NOT DETECTED
SARS-COV-2 RNA NPH QL NAA+NON-PROBE: NOT DETECTED
SODIUM SERPL-SCNC: 141 MMOL/L (ref 136–145)
SP GR UR STRIP: 1.02 (ref 1–1.03)
SPECIMEN DESCRIPTION: NORMAL
TROPONIN I SERPL HS-MCNC: 8 NG/L (ref 0–14)
UROBILINOGEN UR STRIP-ACNC: 0.2 EU/DL (ref 0–1)
WBC #/AREA URNS HPF: ABNORMAL /HPF
WBC OTHER # BLD: 6.2 K/UL (ref 4.5–11.5)
WBC OTHER # BLD: 9.3 K/UL (ref 4.5–11.5)

## 2025-08-17 PROCEDURE — 80053 COMPREHEN METABOLIC PANEL: CPT

## 2025-08-17 PROCEDURE — 6360000002 HC RX W HCPCS: Performed by: STUDENT IN AN ORGANIZED HEALTH CARE EDUCATION/TRAINING PROGRAM

## 2025-08-17 PROCEDURE — 83735 ASSAY OF MAGNESIUM: CPT

## 2025-08-17 PROCEDURE — 36415 COLL VENOUS BLD VENIPUNCTURE: CPT

## 2025-08-17 PROCEDURE — 85379 FIBRIN DEGRADATION QUANT: CPT

## 2025-08-17 PROCEDURE — 71275 CT ANGIOGRAPHY CHEST: CPT

## 2025-08-17 PROCEDURE — 71045 X-RAY EXAM CHEST 1 VIEW: CPT

## 2025-08-17 PROCEDURE — 84484 ASSAY OF TROPONIN QUANT: CPT

## 2025-08-17 PROCEDURE — 96374 THER/PROPH/DIAG INJ IV PUSH: CPT

## 2025-08-17 PROCEDURE — 85730 THROMBOPLASTIN TIME PARTIAL: CPT

## 2025-08-17 PROCEDURE — 6360000004 HC RX CONTRAST MEDICATION: Performed by: RADIOLOGY

## 2025-08-17 PROCEDURE — 2500000003 HC RX 250 WO HCPCS: Performed by: INTERNAL MEDICINE

## 2025-08-17 PROCEDURE — 6370000000 HC RX 637 (ALT 250 FOR IP): Performed by: INTERNAL MEDICINE

## 2025-08-17 PROCEDURE — 81001 URINALYSIS AUTO W/SCOPE: CPT

## 2025-08-17 PROCEDURE — 96375 TX/PRO/DX INJ NEW DRUG ADDON: CPT

## 2025-08-17 PROCEDURE — 85025 COMPLETE CBC W/AUTO DIFF WBC: CPT

## 2025-08-17 PROCEDURE — 83605 ASSAY OF LACTIC ACID: CPT

## 2025-08-17 PROCEDURE — 83880 ASSAY OF NATRIURETIC PEPTIDE: CPT

## 2025-08-17 PROCEDURE — 6360000002 HC RX W HCPCS

## 2025-08-17 PROCEDURE — 93005 ELECTROCARDIOGRAM TRACING: CPT

## 2025-08-17 PROCEDURE — 2500000003 HC RX 250 WO HCPCS: Performed by: STUDENT IN AN ORGANIZED HEALTH CARE EDUCATION/TRAINING PROGRAM

## 2025-08-17 PROCEDURE — 85027 COMPLETE CBC AUTOMATED: CPT

## 2025-08-17 PROCEDURE — 87086 URINE CULTURE/COLONY COUNT: CPT

## 2025-08-17 PROCEDURE — 0202U NFCT DS 22 TRGT SARS-COV-2: CPT

## 2025-08-17 PROCEDURE — 87040 BLOOD CULTURE FOR BACTERIA: CPT

## 2025-08-17 PROCEDURE — 2060000000 HC ICU INTERMEDIATE R&B

## 2025-08-17 RX ORDER — SODIUM CHLORIDE 0.9 % (FLUSH) 0.9 %
5-40 SYRINGE (ML) INJECTION EVERY 12 HOURS SCHEDULED
Status: DISCONTINUED | OUTPATIENT
Start: 2025-08-17 | End: 2025-08-19 | Stop reason: HOSPADM

## 2025-08-17 RX ORDER — DIPHENHYDRAMINE HYDROCHLORIDE 50 MG/ML
25 INJECTION, SOLUTION INTRAMUSCULAR; INTRAVENOUS ONCE
Status: COMPLETED | OUTPATIENT
Start: 2025-08-17 | End: 2025-08-17

## 2025-08-17 RX ORDER — HEPARIN SODIUM 1000 [USP'U]/ML
80 INJECTION, SOLUTION INTRAVENOUS; SUBCUTANEOUS ONCE
Status: COMPLETED | OUTPATIENT
Start: 2025-08-17 | End: 2025-08-17

## 2025-08-17 RX ORDER — SODIUM CHLORIDE 0.9 % (FLUSH) 0.9 %
5-40 SYRINGE (ML) INJECTION PRN
Status: DISCONTINUED | OUTPATIENT
Start: 2025-08-17 | End: 2025-08-19 | Stop reason: HOSPADM

## 2025-08-17 RX ORDER — SODIUM PHOSPHATE,MONO-DIBASIC 19G-7G/197
1 ENEMA (ML) RECTAL DAILY PRN
COMMUNITY

## 2025-08-17 RX ORDER — FERROUS SULFATE 325(65) MG
325 TABLET ORAL EVERY OTHER DAY
Status: DISCONTINUED | OUTPATIENT
Start: 2025-08-17 | End: 2025-08-17

## 2025-08-17 RX ORDER — IOPAMIDOL 755 MG/ML
75 INJECTION, SOLUTION INTRAVASCULAR
Status: COMPLETED | OUTPATIENT
Start: 2025-08-17 | End: 2025-08-17

## 2025-08-17 RX ORDER — DICYCLOMINE HYDROCHLORIDE 10 MG/1
20 CAPSULE ORAL 3 TIMES DAILY
Status: DISCONTINUED | OUTPATIENT
Start: 2025-08-17 | End: 2025-08-19 | Stop reason: HOSPADM

## 2025-08-17 RX ORDER — LABETALOL HYDROCHLORIDE 5 MG/ML
10 INJECTION, SOLUTION INTRAVENOUS ONCE
Status: COMPLETED | OUTPATIENT
Start: 2025-08-17 | End: 2025-08-17

## 2025-08-17 RX ORDER — SODIUM CHLORIDE 9 MG/ML
INJECTION, SOLUTION INTRAVENOUS PRN
Status: DISCONTINUED | OUTPATIENT
Start: 2025-08-17 | End: 2025-08-19 | Stop reason: HOSPADM

## 2025-08-17 RX ORDER — ACETAMINOPHEN 650 MG/1
650 SUPPOSITORY RECTAL EVERY 6 HOURS PRN
Status: DISCONTINUED | OUTPATIENT
Start: 2025-08-17 | End: 2025-08-19 | Stop reason: HOSPADM

## 2025-08-17 RX ORDER — DOCUSATE SODIUM 100 MG/1
100 CAPSULE, LIQUID FILLED ORAL 2 TIMES DAILY
Status: DISCONTINUED | OUTPATIENT
Start: 2025-08-17 | End: 2025-08-19 | Stop reason: HOSPADM

## 2025-08-17 RX ORDER — POTASSIUM CHLORIDE 7.45 MG/ML
10 INJECTION INTRAVENOUS PRN
Status: DISCONTINUED | OUTPATIENT
Start: 2025-08-17 | End: 2025-08-19 | Stop reason: HOSPADM

## 2025-08-17 RX ORDER — POTASSIUM CHLORIDE 1500 MG/1
40 TABLET, EXTENDED RELEASE ORAL PRN
Status: DISCONTINUED | OUTPATIENT
Start: 2025-08-17 | End: 2025-08-19 | Stop reason: HOSPADM

## 2025-08-17 RX ORDER — HEPARIN SODIUM 10000 [USP'U]/100ML
5-30 INJECTION, SOLUTION INTRAVENOUS CONTINUOUS
Status: DISCONTINUED | OUTPATIENT
Start: 2025-08-17 | End: 2025-08-18

## 2025-08-17 RX ORDER — TAMSULOSIN HYDROCHLORIDE 0.4 MG/1
0.4 CAPSULE ORAL DAILY
Status: DISCONTINUED | OUTPATIENT
Start: 2025-08-17 | End: 2025-08-19 | Stop reason: HOSPADM

## 2025-08-17 RX ORDER — POTASSIUM CHLORIDE 7.45 MG/ML
10 INJECTION INTRAVENOUS
Status: COMPLETED | OUTPATIENT
Start: 2025-08-17 | End: 2025-08-17

## 2025-08-17 RX ORDER — FERROUS SULFATE 325(65) MG
325 TABLET ORAL EVERY OTHER DAY
Status: DISCONTINUED | OUTPATIENT
Start: 2025-08-18 | End: 2025-08-19 | Stop reason: HOSPADM

## 2025-08-17 RX ORDER — HEPARIN SODIUM 1000 [USP'U]/ML
40 INJECTION, SOLUTION INTRAVENOUS; SUBCUTANEOUS PRN
Status: DISCONTINUED | OUTPATIENT
Start: 2025-08-17 | End: 2025-08-19 | Stop reason: HOSPADM

## 2025-08-17 RX ORDER — ONDANSETRON 4 MG/1
4 TABLET, ORALLY DISINTEGRATING ORAL EVERY 8 HOURS PRN
Status: DISCONTINUED | OUTPATIENT
Start: 2025-08-17 | End: 2025-08-19 | Stop reason: HOSPADM

## 2025-08-17 RX ORDER — HEPARIN SODIUM 1000 [USP'U]/ML
80 INJECTION, SOLUTION INTRAVENOUS; SUBCUTANEOUS PRN
Status: DISCONTINUED | OUTPATIENT
Start: 2025-08-17 | End: 2025-08-19 | Stop reason: HOSPADM

## 2025-08-17 RX ORDER — CLOPIDOGREL BISULFATE 75 MG/1
75 TABLET ORAL DAILY
Status: DISCONTINUED | OUTPATIENT
Start: 2025-08-17 | End: 2025-08-19 | Stop reason: HOSPADM

## 2025-08-17 RX ORDER — SENNOSIDES 8.6 MG/1
1 TABLET ORAL DAILY PRN
Status: DISCONTINUED | OUTPATIENT
Start: 2025-08-17 | End: 2025-08-19 | Stop reason: HOSPADM

## 2025-08-17 RX ORDER — POLYETHYLENE GLYCOL 3350 17 G/17G
17 POWDER, FOR SOLUTION ORAL DAILY PRN
Status: DISCONTINUED | OUTPATIENT
Start: 2025-08-17 | End: 2025-08-19 | Stop reason: HOSPADM

## 2025-08-17 RX ORDER — ACETAMINOPHEN 325 MG/1
650 TABLET ORAL EVERY 6 HOURS PRN
Status: DISCONTINUED | OUTPATIENT
Start: 2025-08-17 | End: 2025-08-19 | Stop reason: HOSPADM

## 2025-08-17 RX ORDER — METOPROLOL SUCCINATE 25 MG/1
25 TABLET, EXTENDED RELEASE ORAL DAILY
Status: DISCONTINUED | OUTPATIENT
Start: 2025-08-17 | End: 2025-08-19 | Stop reason: HOSPADM

## 2025-08-17 RX ORDER — MAGNESIUM SULFATE IN WATER 40 MG/ML
2000 INJECTION, SOLUTION INTRAVENOUS PRN
Status: DISCONTINUED | OUTPATIENT
Start: 2025-08-17 | End: 2025-08-19 | Stop reason: HOSPADM

## 2025-08-17 RX ORDER — ACETAMINOPHEN 325 MG/1
650 TABLET ORAL EVERY 4 HOURS PRN
Status: DISCONTINUED | OUTPATIENT
Start: 2025-08-17 | End: 2025-08-17 | Stop reason: SDUPTHER

## 2025-08-17 RX ORDER — ONDANSETRON 2 MG/ML
4 INJECTION INTRAMUSCULAR; INTRAVENOUS EVERY 6 HOURS PRN
Status: DISCONTINUED | OUTPATIENT
Start: 2025-08-17 | End: 2025-08-19 | Stop reason: HOSPADM

## 2025-08-17 RX ORDER — SENNOSIDES 8.6 MG/1
1 TABLET ORAL NIGHTLY
Status: DISCONTINUED | OUTPATIENT
Start: 2025-08-17 | End: 2025-08-19 | Stop reason: HOSPADM

## 2025-08-17 RX ORDER — BISACODYL 10 MG
10 SUPPOSITORY, RECTAL RECTAL DAILY PRN
COMMUNITY

## 2025-08-17 RX ADMIN — POTASSIUM CHLORIDE 10 MEQ: 10 INJECTION, SOLUTION INTRAVENOUS at 03:04

## 2025-08-17 RX ADMIN — DIPHENHYDRAMINE HYDROCHLORIDE 25 MG: 50 INJECTION INTRAMUSCULAR; INTRAVENOUS at 01:35

## 2025-08-17 RX ADMIN — DICYCLOMINE HYDROCHLORIDE 20 MG: 10 CAPSULE ORAL at 15:47

## 2025-08-17 RX ADMIN — IOPAMIDOL 75 ML: 755 INJECTION, SOLUTION INTRAVENOUS at 01:45

## 2025-08-17 RX ADMIN — METOPROLOL SUCCINATE 25 MG: 25 TABLET, EXTENDED RELEASE ORAL at 15:47

## 2025-08-17 RX ADMIN — SODIUM CHLORIDE, PRESERVATIVE FREE 10 ML: 5 INJECTION INTRAVENOUS at 20:49

## 2025-08-17 RX ADMIN — DOCUSATE SODIUM 100 MG: 100 CAPSULE, LIQUID FILLED ORAL at 15:47

## 2025-08-17 RX ADMIN — SENNOSIDES 8.6 MG: 8.6 TABLET ORAL at 20:48

## 2025-08-17 RX ADMIN — HEPARIN SODIUM 5100 UNITS: 1000 INJECTION, SOLUTION INTRAVENOUS; SUBCUTANEOUS at 03:00

## 2025-08-17 RX ADMIN — CLOPIDOGREL BISULFATE 75 MG: 75 TABLET, FILM COATED ORAL at 15:47

## 2025-08-17 RX ADMIN — FAMOTIDINE 20 MG: 10 INJECTION, SOLUTION INTRAVENOUS at 01:35

## 2025-08-17 RX ADMIN — LABETALOL HYDROCHLORIDE 10 MG: 5 INJECTION INTRAVENOUS at 09:15

## 2025-08-17 RX ADMIN — POTASSIUM CHLORIDE 10 MEQ: 10 INJECTION, SOLUTION INTRAVENOUS at 08:40

## 2025-08-17 RX ADMIN — TAMSULOSIN HYDROCHLORIDE 0.4 MG: 0.4 CAPSULE ORAL at 15:47

## 2025-08-17 RX ADMIN — POTASSIUM CHLORIDE 10 MEQ: 10 INJECTION, SOLUTION INTRAVENOUS at 04:56

## 2025-08-17 RX ADMIN — DICYCLOMINE HYDROCHLORIDE 20 MG: 10 CAPSULE ORAL at 20:48

## 2025-08-17 RX ADMIN — WATER 125 MG: 1 INJECTION INTRAMUSCULAR; INTRAVENOUS; SUBCUTANEOUS at 01:35

## 2025-08-17 RX ADMIN — HEPARIN SODIUM 18 UNITS/KG/HR: 10000 INJECTION, SOLUTION INTRAVENOUS at 03:03

## 2025-08-17 RX ADMIN — DOCUSATE SODIUM 100 MG: 100 CAPSULE, LIQUID FILLED ORAL at 20:48

## 2025-08-17 ASSESSMENT — PAIN - FUNCTIONAL ASSESSMENT: PAIN_FUNCTIONAL_ASSESSMENT: 0-10

## 2025-08-17 ASSESSMENT — PAIN SCALES - GENERAL: PAINLEVEL_OUTOF10: 0

## 2025-08-18 LAB
MICROORGANISM SPEC CULT: NO GROWTH
PARTIAL THROMBOPLASTIN TIME: 133.6 SEC (ref 24.5–35.1)
PARTIAL THROMBOPLASTIN TIME: 37.9 SEC (ref 24.5–35.1)
SERVICE CMNT-IMP: NORMAL
SPECIMEN DESCRIPTION: NORMAL

## 2025-08-18 PROCEDURE — 85730 THROMBOPLASTIN TIME PARTIAL: CPT

## 2025-08-18 PROCEDURE — 2500000003 HC RX 250 WO HCPCS: Performed by: INTERNAL MEDICINE

## 2025-08-18 PROCEDURE — 36415 COLL VENOUS BLD VENIPUNCTURE: CPT

## 2025-08-18 PROCEDURE — 6370000000 HC RX 637 (ALT 250 FOR IP): Performed by: INTERNAL MEDICINE

## 2025-08-18 PROCEDURE — 6360000002 HC RX W HCPCS

## 2025-08-18 PROCEDURE — 2060000000 HC ICU INTERMEDIATE R&B

## 2025-08-18 RX ADMIN — HEPARIN SODIUM 2500 UNITS: 1000 INJECTION, SOLUTION INTRAVENOUS; SUBCUTANEOUS at 07:25

## 2025-08-18 RX ADMIN — DICYCLOMINE HYDROCHLORIDE 20 MG: 10 CAPSULE ORAL at 20:46

## 2025-08-18 RX ADMIN — SENNOSIDES 8.6 MG: 8.6 TABLET ORAL at 20:46

## 2025-08-18 RX ADMIN — SODIUM CHLORIDE, PRESERVATIVE FREE 10 ML: 5 INJECTION INTRAVENOUS at 20:49

## 2025-08-18 RX ADMIN — CLOPIDOGREL BISULFATE 75 MG: 75 TABLET, FILM COATED ORAL at 09:45

## 2025-08-18 RX ADMIN — DOCUSATE SODIUM 100 MG: 100 CAPSULE, LIQUID FILLED ORAL at 20:46

## 2025-08-18 RX ADMIN — HEPARIN SODIUM 14 UNITS/KG/HR: 10000 INJECTION, SOLUTION INTRAVENOUS at 09:38

## 2025-08-18 RX ADMIN — FERROUS SULFATE TAB 325 MG (65 MG ELEMENTAL FE) 325 MG: 325 (65 FE) TAB at 09:39

## 2025-08-18 RX ADMIN — METOPROLOL SUCCINATE 25 MG: 25 TABLET, EXTENDED RELEASE ORAL at 09:33

## 2025-08-18 RX ADMIN — DICYCLOMINE HYDROCHLORIDE 20 MG: 10 CAPSULE ORAL at 17:13

## 2025-08-18 RX ADMIN — TAMSULOSIN HYDROCHLORIDE 0.4 MG: 0.4 CAPSULE ORAL at 09:41

## 2025-08-18 RX ADMIN — DOCUSATE SODIUM 100 MG: 100 CAPSULE, LIQUID FILLED ORAL at 09:42

## 2025-08-18 RX ADMIN — APIXABAN 10 MG: 5 TABLET, FILM COATED ORAL at 17:13

## 2025-08-18 RX ADMIN — DICYCLOMINE HYDROCHLORIDE 20 MG: 10 CAPSULE ORAL at 09:40

## 2025-08-18 ASSESSMENT — PAIN DESCRIPTION - ORIENTATION: ORIENTATION: LOWER;MID

## 2025-08-18 ASSESSMENT — PAIN DESCRIPTION - DESCRIPTORS: DESCRIPTORS: ACHING

## 2025-08-18 ASSESSMENT — PAIN DESCRIPTION - FREQUENCY: FREQUENCY: CONTINUOUS

## 2025-08-18 ASSESSMENT — PAIN DESCRIPTION - LOCATION: LOCATION: ABDOMEN

## 2025-08-18 ASSESSMENT — PAIN SCALES - GENERAL
PAINLEVEL_OUTOF10: 0
PAINLEVEL_OUTOF10: 3

## 2025-08-18 ASSESSMENT — PAIN - FUNCTIONAL ASSESSMENT: PAIN_FUNCTIONAL_ASSESSMENT: 0-10

## 2025-08-19 VITALS
WEIGHT: 140 LBS | SYSTOLIC BLOOD PRESSURE: 156 MMHG | DIASTOLIC BLOOD PRESSURE: 88 MMHG | HEART RATE: 62 BPM | RESPIRATION RATE: 15 BRPM | TEMPERATURE: 97.1 F | BODY MASS INDEX: 22.6 KG/M2 | OXYGEN SATURATION: 97 %

## 2025-08-19 ASSESSMENT — PAIN SCALES - GENERAL: PAINLEVEL_OUTOF10: 0

## 2025-08-21 LAB
EKG ATRIAL RATE: 81 BPM
EKG P AXIS: 29 DEGREES
EKG P-R INTERVAL: 188 MS
EKG Q-T INTERVAL: 380 MS
EKG QRS DURATION: 88 MS
EKG QTC CALCULATION (BAZETT): 441 MS
EKG R AXIS: -25 DEGREES
EKG T AXIS: 11 DEGREES
EKG VENTRICULAR RATE: 81 BPM
